# Patient Record
Sex: FEMALE | Race: ASIAN | Employment: FULL TIME | URBAN - METROPOLITAN AREA
[De-identification: names, ages, dates, MRNs, and addresses within clinical notes are randomized per-mention and may not be internally consistent; named-entity substitution may affect disease eponyms.]

---

## 2024-06-24 ENCOUNTER — HOSPITAL ENCOUNTER (INPATIENT)
Facility: HOSPITAL | Age: 44
LOS: 10 days | Discharge: HOME HEALTH | DRG: 409 | End: 2024-07-05
Attending: EMERGENCY MEDICINE | Admitting: HOSPITALIST
Payer: COMMERCIAL

## 2024-06-24 ENCOUNTER — HOSPITAL ENCOUNTER (INPATIENT)
Facility: HOSPITAL | Age: 44
LOS: 10 days | Discharge: HOME | DRG: 409 | End: 2024-07-05
Attending: EMERGENCY MEDICINE | Admitting: HOSPITALIST
Payer: COMMERCIAL

## 2024-06-24 DIAGNOSIS — K80.50 CHOLEDOCHOLITHIASIS: ICD-10-CM

## 2024-06-24 DIAGNOSIS — R17 ELEVATED BILIRUBIN: ICD-10-CM

## 2024-06-24 DIAGNOSIS — K83.1 COMMON BILE DUCT (CBD) OBSTRUCTION (HCC): ICD-10-CM

## 2024-06-24 DIAGNOSIS — K83.9 BILE LEAK: ICD-10-CM

## 2024-06-24 DIAGNOSIS — R74.8 ELEVATED LIVER ENZYMES: Primary | ICD-10-CM

## 2024-06-24 RX ORDER — PANTOPRAZOLE SODIUM 20 MG/1
20 TABLET, DELAYED RELEASE ORAL
COMMUNITY

## 2024-06-24 RX ORDER — ONDANSETRON 2 MG/ML
4 INJECTION INTRAMUSCULAR; INTRAVENOUS ONCE
Status: COMPLETED | OUTPATIENT
Start: 2024-06-24 | End: 2024-06-25

## 2024-06-25 ENCOUNTER — ANESTHESIA EVENT (OUTPATIENT)
Dept: SURGERY | Facility: HOSPITAL | Age: 44
DRG: 409 | End: 2024-06-25
Payer: COMMERCIAL

## 2024-06-25 ENCOUNTER — ANESTHESIA EVENT (OUTPATIENT)
Dept: ENDOSCOPY | Facility: HOSPITAL | Age: 44
DRG: 409 | End: 2024-06-25
Payer: COMMERCIAL

## 2024-06-25 ENCOUNTER — APPOINTMENT (OUTPATIENT)
Dept: CT IMAGING | Facility: HOSPITAL | Age: 44
DRG: 409 | End: 2024-06-25
Attending: EMERGENCY MEDICINE
Payer: COMMERCIAL

## 2024-06-25 ENCOUNTER — APPOINTMENT (OUTPATIENT)
Dept: GENERAL RADIOLOGY | Facility: HOSPITAL | Age: 44
DRG: 409 | End: 2024-06-25
Attending: HOSPITALIST
Payer: COMMERCIAL

## 2024-06-25 ENCOUNTER — ANESTHESIA (OUTPATIENT)
Dept: ENDOSCOPY | Facility: HOSPITAL | Age: 44
DRG: 409 | End: 2024-06-25
Payer: COMMERCIAL

## 2024-06-25 PROBLEM — R74.8 ELEVATED LIVER ENZYMES: Status: ACTIVE | Noted: 2024-06-25

## 2024-06-25 PROBLEM — R17 ELEVATED BILIRUBIN: Status: ACTIVE | Noted: 2024-06-25

## 2024-06-25 PROBLEM — K83.1 COMMON BILE DUCT (CBD) OBSTRUCTION (HCC): Status: ACTIVE | Noted: 2024-06-25

## 2024-06-25 PROBLEM — K80.01 CALCULUS OF GALLBLADDER WITH ACUTE CHOLECYSTITIS AND OBSTRUCTION: Status: ACTIVE | Noted: 2024-06-25

## 2024-06-25 LAB
ALBUMIN SERPL-MCNC: 2.8 G/DL (ref 3.4–5)
ALBUMIN SERPL-MCNC: 3.5 G/DL (ref 3.4–5)
ALBUMIN/GLOB SERPL: 0.7 {RATIO} (ref 1–2)
ALBUMIN/GLOB SERPL: 0.7 {RATIO} (ref 1–2)
ALP LIVER SERPL-CCNC: 134 U/L
ALP LIVER SERPL-CCNC: 163 U/L
ALT SERPL-CCNC: 565 U/L
ALT SERPL-CCNC: 713 U/L
ANION GAP SERPL CALC-SCNC: 5 MMOL/L (ref 0–18)
ANION GAP SERPL CALC-SCNC: 5 MMOL/L (ref 0–18)
AST SERPL-CCNC: 224 U/L (ref 15–37)
AST SERPL-CCNC: 302 U/L (ref 15–37)
B-HCG UR QL: NEGATIVE
BASOPHILS # BLD AUTO: 0.04 X10(3) UL (ref 0–0.2)
BASOPHILS NFR BLD AUTO: 0.4 %
BILIRUB SERPL-MCNC: 6.4 MG/DL (ref 0.1–2)
BILIRUB SERPL-MCNC: 8 MG/DL (ref 0.1–2)
BILIRUB UR QL CFM: POSITIVE
BUN BLD-MCNC: 6 MG/DL (ref 9–23)
BUN BLD-MCNC: 7 MG/DL (ref 9–23)
CALCIUM BLD-MCNC: 8.2 MG/DL (ref 8.5–10.1)
CALCIUM BLD-MCNC: 9.4 MG/DL (ref 8.5–10.1)
CHLORIDE SERPL-SCNC: 103 MMOL/L (ref 98–112)
CHLORIDE SERPL-SCNC: 108 MMOL/L (ref 98–112)
CO2 SERPL-SCNC: 24 MMOL/L (ref 21–32)
CO2 SERPL-SCNC: 28 MMOL/L (ref 21–32)
CREAT BLD-MCNC: 0.59 MG/DL
CREAT BLD-MCNC: 0.76 MG/DL
EGFRCR SERPLBLD CKD-EPI 2021: 100 ML/MIN/1.73M2 (ref 60–?)
EGFRCR SERPLBLD CKD-EPI 2021: 115 ML/MIN/1.73M2 (ref 60–?)
EOSINOPHIL # BLD AUTO: 0.14 X10(3) UL (ref 0–0.7)
EOSINOPHIL NFR BLD AUTO: 1.5 %
ERYTHROCYTE [DISTWIDTH] IN BLOOD BY AUTOMATED COUNT: 13.4 %
GLOBULIN PLAS-MCNC: 3.9 G/DL (ref 2.8–4.4)
GLOBULIN PLAS-MCNC: 5 G/DL (ref 2.8–4.4)
GLUCOSE BLD-MCNC: 73 MG/DL (ref 70–99)
GLUCOSE BLD-MCNC: 91 MG/DL (ref 70–99)
GLUCOSE UR STRIP.AUTO-MCNC: NORMAL MG/DL
HCT VFR BLD AUTO: 42.2 %
HGB BLD-MCNC: 15.1 G/DL
IMM GRANULOCYTES # BLD AUTO: 0.06 X10(3) UL (ref 0–1)
IMM GRANULOCYTES NFR BLD: 0.7 %
KETONES UR STRIP.AUTO-MCNC: 80 MG/DL
LEUKOCYTE ESTERASE UR QL STRIP.AUTO: NEGATIVE
LIPASE SERPL-CCNC: 33 U/L (ref 13–75)
LYMPHOCYTES # BLD AUTO: 1.71 X10(3) UL (ref 1–4)
LYMPHOCYTES NFR BLD AUTO: 18.9 %
MCH RBC QN AUTO: 29.8 PG (ref 26–34)
MCHC RBC AUTO-ENTMCNC: 35.8 G/DL (ref 31–37)
MCV RBC AUTO: 83.2 FL
MONOCYTES # BLD AUTO: 0.94 X10(3) UL (ref 0.1–1)
MONOCYTES NFR BLD AUTO: 10.4 %
NEUTROPHILS # BLD AUTO: 6.18 X10 (3) UL (ref 1.5–7.7)
NEUTROPHILS # BLD AUTO: 6.18 X10(3) UL (ref 1.5–7.7)
NEUTROPHILS NFR BLD AUTO: 68.1 %
NITRITE UR QL STRIP.AUTO: NEGATIVE
OSMOLALITY SERPL CALC.SUM OF ELEC: 280 MOSM/KG (ref 275–295)
OSMOLALITY SERPL CALC.SUM OF ELEC: 280 MOSM/KG (ref 275–295)
PH UR STRIP.AUTO: 6.5 [PH] (ref 5–8)
PLATELET # BLD AUTO: 113 10(3)UL (ref 150–450)
PLATELETS.RETICULATED NFR BLD AUTO: 6.4 % (ref 0–7)
POTASSIUM SERPL-SCNC: 3.6 MMOL/L (ref 3.5–5.1)
POTASSIUM SERPL-SCNC: 4.4 MMOL/L (ref 3.5–5.1)
PROT SERPL-MCNC: 6.7 G/DL (ref 6.4–8.2)
PROT SERPL-MCNC: 8.5 G/DL (ref 6.4–8.2)
RBC # BLD AUTO: 5.07 X10(6)UL
RBC UR QL AUTO: NEGATIVE
SODIUM SERPL-SCNC: 136 MMOL/L (ref 136–145)
SODIUM SERPL-SCNC: 137 MMOL/L (ref 136–145)
SP GR UR STRIP.AUTO: 1.02 (ref 1–1.03)
UROBILINOGEN UR STRIP.AUTO-MCNC: 2 MG/DL
WBC # BLD AUTO: 9.1 X10(3) UL (ref 4–11)

## 2024-06-25 PROCEDURE — 74328 X-RAY BILE DUCT ENDOSCOPY: CPT | Performed by: HOSPITALIST

## 2024-06-25 PROCEDURE — 0FC98ZZ EXTIRPATION OF MATTER FROM COMMON BILE DUCT, VIA NATURAL OR ARTIFICIAL OPENING ENDOSCOPIC: ICD-10-PCS | Performed by: INTERNAL MEDICINE

## 2024-06-25 PROCEDURE — 99254 IP/OBS CNSLTJ NEW/EST MOD 60: CPT | Performed by: SURGERY

## 2024-06-25 PROCEDURE — BF40ZZZ ULTRASONOGRAPHY OF BILE DUCTS: ICD-10-PCS | Performed by: INTERNAL MEDICINE

## 2024-06-25 PROCEDURE — 99223 1ST HOSP IP/OBS HIGH 75: CPT | Performed by: HOSPITALIST

## 2024-06-25 PROCEDURE — BF101ZZ FLUOROSCOPY OF BILE DUCTS USING LOW OSMOLAR CONTRAST: ICD-10-PCS | Performed by: INTERNAL MEDICINE

## 2024-06-25 PROCEDURE — 74177 CT ABD & PELVIS W/CONTRAST: CPT | Performed by: EMERGENCY MEDICINE

## 2024-06-25 PROCEDURE — 0DJ08ZZ INSPECTION OF UPPER INTESTINAL TRACT, VIA NATURAL OR ARTIFICIAL OPENING ENDOSCOPIC: ICD-10-PCS | Performed by: INTERNAL MEDICINE

## 2024-06-25 PROCEDURE — 0F7D8DZ DILATION OF PANCREATIC DUCT WITH INTRALUMINAL DEVICE, VIA NATURAL OR ARTIFICIAL OPENING ENDOSCOPIC: ICD-10-PCS | Performed by: INTERNAL MEDICINE

## 2024-06-25 DEVICE — GEENEN SOF-FLEX PANCREATIC STENT
Type: IMPLANTABLE DEVICE | Status: FUNCTIONAL
Brand: GEENEN SOF-FLEX

## 2024-06-25 RX ORDER — SODIUM CHLORIDE, SODIUM LACTATE, POTASSIUM CHLORIDE, CALCIUM CHLORIDE 600; 310; 30; 20 MG/100ML; MG/100ML; MG/100ML; MG/100ML
INJECTION, SOLUTION INTRAVENOUS CONTINUOUS PRN
Status: DISCONTINUED | OUTPATIENT
Start: 2024-06-25 | End: 2024-06-25 | Stop reason: SURG

## 2024-06-25 RX ORDER — HYDROMORPHONE HYDROCHLORIDE 1 MG/ML
0.4 INJECTION, SOLUTION INTRAMUSCULAR; INTRAVENOUS; SUBCUTANEOUS EVERY 5 MIN PRN
Status: ACTIVE | OUTPATIENT
Start: 2024-06-25 | End: 2024-06-26

## 2024-06-25 RX ORDER — INDOMETHACIN 100 MG
SUPPOSITORY, RECTAL RECTAL
Status: DISPENSED
Start: 2024-06-25 | End: 2024-06-26

## 2024-06-25 RX ORDER — NALOXONE HYDROCHLORIDE 0.4 MG/ML
0.08 INJECTION, SOLUTION INTRAMUSCULAR; INTRAVENOUS; SUBCUTANEOUS AS NEEDED
Status: ACTIVE | OUTPATIENT
Start: 2024-06-25 | End: 2024-06-26

## 2024-06-25 RX ORDER — HYDROMORPHONE HYDROCHLORIDE 1 MG/ML
0.6 INJECTION, SOLUTION INTRAMUSCULAR; INTRAVENOUS; SUBCUTANEOUS EVERY 5 MIN PRN
Status: ACTIVE | OUTPATIENT
Start: 2024-06-25 | End: 2024-06-26

## 2024-06-25 RX ORDER — HYDROMORPHONE HYDROCHLORIDE 1 MG/ML
0.2 INJECTION, SOLUTION INTRAMUSCULAR; INTRAVENOUS; SUBCUTANEOUS EVERY 5 MIN PRN
Status: ACTIVE | OUTPATIENT
Start: 2024-06-25 | End: 2024-06-26

## 2024-06-25 RX ORDER — INDOCYANINE GREEN AND WATER 25 MG
5 KIT INJECTION ONCE
Status: COMPLETED | OUTPATIENT
Start: 2024-06-26 | End: 2024-06-26

## 2024-06-25 RX ORDER — ONDANSETRON 2 MG/ML
4 INJECTION INTRAMUSCULAR; INTRAVENOUS EVERY 6 HOURS PRN
Status: DISCONTINUED | OUTPATIENT
Start: 2024-06-25 | End: 2024-07-02

## 2024-06-25 RX ORDER — PROCHLORPERAZINE EDISYLATE 5 MG/ML
5 INJECTION INTRAMUSCULAR; INTRAVENOUS EVERY 8 HOURS PRN
Status: DISCONTINUED | OUTPATIENT
Start: 2024-06-25 | End: 2024-07-02

## 2024-06-25 RX ORDER — SODIUM CHLORIDE, SODIUM LACTATE, POTASSIUM CHLORIDE, CALCIUM CHLORIDE 600; 310; 30; 20 MG/100ML; MG/100ML; MG/100ML; MG/100ML
INJECTION, SOLUTION INTRAVENOUS CONTINUOUS
Status: DISCONTINUED | OUTPATIENT
Start: 2024-06-25 | End: 2024-06-26 | Stop reason: HOSPADM

## 2024-06-25 RX ORDER — PANTOPRAZOLE SODIUM 20 MG/1
20 TABLET, DELAYED RELEASE ORAL
Status: DISCONTINUED | OUTPATIENT
Start: 2024-06-25 | End: 2024-07-02

## 2024-06-25 RX ORDER — LIDOCAINE HYDROCHLORIDE 10 MG/ML
INJECTION, SOLUTION EPIDURAL; INFILTRATION; INTRACAUDAL; PERINEURAL AS NEEDED
Status: DISCONTINUED | OUTPATIENT
Start: 2024-06-25 | End: 2024-06-25 | Stop reason: SURG

## 2024-06-25 RX ORDER — SODIUM CHLORIDE 9 MG/ML
INJECTION, SOLUTION INTRAVENOUS CONTINUOUS
Status: DISCONTINUED | OUTPATIENT
Start: 2024-06-25 | End: 2024-07-02

## 2024-06-25 RX ADMIN — SODIUM CHLORIDE, SODIUM LACTATE, POTASSIUM CHLORIDE, CALCIUM CHLORIDE: 600; 310; 30; 20 INJECTION, SOLUTION INTRAVENOUS at 16:43:00

## 2024-06-25 RX ADMIN — SODIUM CHLORIDE, SODIUM LACTATE, POTASSIUM CHLORIDE, CALCIUM CHLORIDE: 600; 310; 30; 20 INJECTION, SOLUTION INTRAVENOUS at 16:41:00

## 2024-06-25 RX ADMIN — LIDOCAINE HYDROCHLORIDE 25 MG: 10 INJECTION, SOLUTION EPIDURAL; INFILTRATION; INTRACAUDAL; PERINEURAL at 16:49:00

## 2024-06-25 NOTE — PLAN OF CARE
NURSING ADMISSION NOTE      Patient admitted via Cart  Oriented to room.  Safety precautions initiated.  Bed in low position.  Call light in reach.    Patient alert and oriented x4. VSS. Afebrile. Received from ED for epigastric pain. Denies pain at this time. NPO. Awaiting admitting orders.     0624: Orders received - IVF infusing.

## 2024-06-25 NOTE — ANESTHESIA PREPROCEDURE EVALUATION
PRE-OP EVALUATION    Patient Name: Stephy De Oliveira    Admit Diagnosis: Elevated liver enzymes [R74.8]  Elevated bilirubin [R17]    Pre-op Diagnosis: Choledocholithiasis    ENDOSCOPIC ULTRASOUND (EUS) WITH ENDOSCOPIC RETROGRADE CHOLANGIOPANCREATOGRAPHY    Anesthesia Procedure: ENDOSCOPIC ULTRASOUND (EUS) WITH ENDOSCOPIC RETROGRADE CHOLANGIOPANCREATOGRAPHY  ENDOSCOPIC RETROGRADE CHOLANGIOPANCREATOGRAPHY (ERCP)    Surgeons and Role:     * Km Gallo MD - Primary    Pre-op vitals reviewed.  Temp: 98 °F (36.7 °C)  Pulse: 51  Resp: 18  BP: 101/56  SpO2: 100 %  There is no height or weight on file to calculate BMI.    Current medications reviewed.  Hospital Medications:   [COMPLETED] sodium chloride 0.9 % IV bolus 1,000 mL  1,000 mL Intravenous Once    [COMPLETED] iopamidol 76% (ISOVUE-370) injection for power injector  80 mL Intravenous ONCE PRN    pantoprazole (Protonix) DR tab 20 mg  20 mg Oral QAM AC    sodium chloride 0.9% infusion   Intravenous Continuous    ondansetron (Zofran) 4 MG/2ML injection 4 mg  4 mg Intravenous Q6H PRN    prochlorperazine (Compazine) 10 MG/2ML injection 5 mg  5 mg Intravenous Q8H PRN    piperacillin-tazobactam (Zosyn) 3.375 g in dextrose 5% 100 mL IVPB-ADDV  3.375 g Intravenous Q8H    [START ON 6/26/2024] indocyanine green (IC-Green) injection 5 mg  5 mg Intravenous Once    [COMPLETED] ondansetron (Zofran) 4 MG/2ML injection 4 mg  4 mg Intravenous Once       Outpatient Medications:     Medications Prior to Admission   Medication Sig Dispense Refill Last Dose    pantoprazole 20 MG Oral Tab EC Take 1 tablet (20 mg total) by mouth every morning before breakfast.   6/24/2024       Allergies: Advil [ibuprofen]      Anesthesia Evaluation    Patient summary reviewed.    Anesthetic Complications           GI/Hepatic/Renal                                 Cardiovascular                                                       Endo/Other                                  Pulmonary                            Neuro/Psych                                      History reviewed. No pertinent surgical history.  Social History     Socioeconomic History    Marital status:    Tobacco Use    Smoking status: Never    Smokeless tobacco: Never   Substance and Sexual Activity    Alcohol use: Yes     Comment: socially    Drug use: Never     History   Drug Use Unknown     Available pre-op labs reviewed.  Lab Results   Component Value Date    WBC 9.1 06/25/2024    RBC 5.07 06/25/2024    HGB 15.1 06/25/2024    HCT 42.2 06/25/2024    MCV 83.2 06/25/2024    MCH 29.8 06/25/2024    MCHC 35.8 06/25/2024    RDW 13.4 06/25/2024    .0 (L) 06/25/2024     Lab Results   Component Value Date     06/25/2024    K 3.6 06/25/2024     06/25/2024    CO2 24.0 06/25/2024    BUN 6 (L) 06/25/2024    CREATSERUM 0.59 06/25/2024    GLU 73 06/25/2024    CA 8.2 (L) 06/25/2024            Airway      Mallampati: II  Mouth opening: 3 FB  TM distance: 4 - 6 cm  Neck ROM: full Cardiovascular    Cardiovascular exam normal.  Rhythm: regular  Rate: normal     Dental             Pulmonary    Pulmonary exam normal.                 Other findings              ASA: 2   Plan: MAC  NPO status verified and patient meets guidelines.          Plan/risks discussed with: patient and significant other                Present on Admission:  **None**

## 2024-06-25 NOTE — PROGRESS NOTES
[facility]   part of [facility]     Hospitalist Progress Note     Stephy De Oliveira Patient Status:  Observation    1980 MRN [id number]   Location [facility] 4NW-A Attending Rush Mendez MD   Hosp Day # 2 PCP None Pcp     Chief Complaint: Abdominal pain    Subjective:   Patient denies abdominal pain. No nausea or vomiting.     Current medications:   enoxaparin  40 mg Subcutaneous Daily    pantoprazole  20 mg Oral QAM AC    piperacillin-tazobactam  3.375 g Intravenous Q8H       Objective:    Review of Systems:   10 point ROS completed and was negative, except for pertinent positive and negatives stated in subjective.    Vital signs:  Temp:  [97.3 °F (36.3 °C)-99 °F (37.2 °C)] 98.3 °F (36.8 °C)  Pulse:  [50-71] 71  Resp:  [12-27] 19  BP: ()/(59-72) 111/64  SpO2:  [97 %-100 %] 100 %  Patient Weight for the past 72 hrs:   Weight   24 1949 135 lb (61.2 kg)     Physical Exam:    General: No acute distress.   Respiratory: Clear to auscultation bilaterally.   Cardiovascular: S1, S2. Regular rate and rhythm.   Abdomen: Soft, tender RUQ, nondistended.  Positive bowel sounds.   Extremities: No edema.  Neuro: AAOx3    Diagnostic Data:    Labs:  Recent Labs   Lab 24  0020 24  0635 24  0611   WBC 9.1 7.3 15.5*   HGB 15.1 13.1 12.5   MCV 83.2 85.9 87.1   .0* 99.0* 85.0*       Recent Labs   Lab 24  0720 24  0635 24  0611   GLU 73 92 93   BUN 6* 6* 5*   CREATSERUM 0.59 0.43* 0.50*   CA 8.2* 7.9* 7.8*   ALB 2.8* 2.3* 2.4*    138 137   K 3.6 3.8 3.5    110 106   CO2 24.0 21.0 25.0   ALKPHO 134* 112* 100*   * 148* 156*   * 455* 454*   BILT 6.4* 2.6* 3.1*   TP 6.7 5.9* 5.7*       CrCl cannot be calculated (Unknown ideal weight.).    No results for input(s): \"PTP\", \"INR\" in the last 168 hours.         COVID-19 Lab Results    COVID-19  No results found for: \"COVID19\"    Pro-Calcitonin  No results for input(s): \"PCT\" in the last 168  hours.    Cardiac  No results for input(s): \"TROP\", \"PBNP\" in the last 168 hours.    Creatinine Kinase  No results for input(s): \"CK\" in the last 168 hours.    Inflammatory Markers  No results for input(s): \"CRP\", \"DENIS\", \"LDH\", \"DDIMER\" in the last 168 hours.    No results for input(s): \"TROP\", \"TROPHS\", \"CK\" in the last 168 hours.    Imaging: Imaging data reviewed in Epic.    Medications:    enoxaparin  40 mg Subcutaneous Daily    pantoprazole  20 mg Oral QAM AC    piperacillin-tazobactam  3.375 g Intravenous Q8H       Assessment & Plan:    Abdominal pain with obstructive jaundice d/t choledocholithiasis sp EUS/ERCP with biliary sphincterotomy, balloon stone extraction and PD stent placement 6/26 sp lap khalif with IOC 6/26  NPO  IVF  IV abx  Pain control  HIDA today at 11  KUB 1 week  GI & Surgery consult  Thrombocytopenia, sepsis/meds/consumptive  Monitor      Supplementary Documentation:   Quality:  DVT Prophylaxis: Lovenox - hold     At this point Ms. De Oliveira is expected to be discharge to: Home    Plan of care discussed with patient and family.    Moshe López MD

## 2024-06-25 NOTE — ANESTHESIA PREPROCEDURE EVALUATION
PRE-OP EVALUATION    Patient Name: Stephy De Oliveira    Admit Diagnosis: Elevated liver enzymes [R74.8]  Elevated bilirubin [R17]    Pre-op Diagnosis: INPATIENT    Laparoscopic cholecystectomy with indocyanine green    Anesthesia Procedure: Laparoscopic cholecystectomy with indocyanine green    Surgeons and Role:     * Cheyanne Pereyra MD - Primary    Pre-op vitals reviewed.  Temp: 98.6 °F (37 °C)  Pulse: 46  Resp: 18  BP: 113/59  SpO2: 98 %  There is no height or weight on file to calculate BMI.    Current medications reviewed.  Hospital Medications:  • [COMPLETED] sodium chloride 0.9 % IV bolus 1,000 mL  1,000 mL Intravenous Once   • [COMPLETED] iopamidol 76% (ISOVUE-370) injection for power injector  80 mL Intravenous ONCE PRN   • [Transfer Hold] pantoprazole (Protonix) DR tab 20 mg  20 mg Oral QAM AC   • sodium chloride 0.9% infusion   Intravenous Continuous   • [Transfer Hold] ondansetron (Zofran) 4 MG/2ML injection 4 mg  4 mg Intravenous Q6H PRN   • [Transfer Hold] prochlorperazine (Compazine) 10 MG/2ML injection 5 mg  5 mg Intravenous Q8H PRN   • piperacillin-tazobactam (Zosyn) 3.375 g in dextrose 5% 100 mL IVPB-ADDV  3.375 g Intravenous Q8H   • [Transfer Hold] indocyanine green (IC-Green) injection 5 mg  5 mg Intravenous Once   • lactated ringers infusion   Intravenous Continuous   • [] lactated ringers IV bolus 500 mL  500 mL Intravenous Once PRN   • [] atropine 0.1 MG/ML injection 0.5 mg  0.5 mg Intravenous PRN   • [] naloxone (Narcan) 0.4 MG/ML injection 0.08 mg  0.08 mg Intravenous PRN   • [] fentaNYL (Sublimaze) 50 mcg/mL injection 25 mcg  25 mcg Intravenous Q5 Min PRN    Or   • [] fentaNYL (Sublimaze) 50 mcg/mL injection 50 mcg  50 mcg Intravenous Q5 Min PRN   • [] HYDROmorphone (Dilaudid) 1 MG/ML injection 0.2 mg  0.2 mg Intravenous Q5 Min PRN    Or   • [] HYDROmorphone (Dilaudid) 1 MG/ML injection 0.4 mg  0.4 mg Intravenous Q5 Min PRN    Or   •  [] HYDROmorphone (Dilaudid) 1 MG/ML injection 0.6 mg  0.6 mg Intravenous Q5 Min PRN   • [] indomethacin (Indocin) 100 MG rectal suppository       • [COMPLETED] ondansetron (Zofran) 4 MG/2ML injection 4 mg  4 mg Intravenous Once       Outpatient Medications:     Medications Prior to Admission   Medication Sig Dispense Refill Last Dose   • pantoprazole 20 MG Oral Tab EC Take 1 tablet (20 mg total) by mouth every morning before breakfast.   2024       Allergies: Advil [ibuprofen]      Anesthesia Evaluation    Patient summary reviewed.    Anesthetic Complications  (-) history of anesthetic complications         GI/Hepatic/Renal  Comment: Calculus of gallbladder with acute cholecystitis and obstruction  Common bile duct obstruction    (+) GERD and well controlled                          Cardiovascular    Negative cardiovascular ROS.  ECG reviewed.  Exercise tolerance: good     MET: >4                                           Endo/Other    Negative endo/other ROS.                              Pulmonary    Negative pulmonary ROS.                       Neuro/Psych    Negative neuro/psych ROS.                                History reviewed. No pertinent surgical history.  Social History     Socioeconomic History   • Marital status:    Tobacco Use   • Smoking status: Never   • Smokeless tobacco: Never   Substance and Sexual Activity   • Alcohol use: Yes     Comment: socially   • Drug use: Never     History   Drug Use Unknown     Available pre-op labs reviewed.  Lab Results   Component Value Date    WBC 9.1 2024    RBC 5.07 2024    HGB 15.1 2024    HCT 42.2 2024    MCV 83.2 2024    MCH 29.8 2024    MCHC 35.8 2024    RDW 13.4 2024    .0 (L) 2024     Lab Results   Component Value Date     2024    K 3.6 2024     2024    CO2 24.0 2024    BUN 6 (L) 2024    CREATSERUM 0.59 2024    GLU 73  06/25/2024    CA 8.2 (L) 06/25/2024            Airway      Mallampati: II  Mouth opening: 3 FB  TM distance: 4 - 6 cm  Neck ROM: full Cardiovascular    Cardiovascular exam normal.  Rhythm: regular  Rate: normal  (-) murmur   Dental    Dentition appears grossly intact         Pulmonary    Pulmonary exam normal.  Breath sounds clear to auscultation bilaterally.               Other findings        ASA: 2   Plan: general  NPO status verified and patient meets guidelines.    Post-procedure pain management plan discussed with surgeon and patient.      Plan/risks discussed with: patient and             Present on Admission:  **None**

## 2024-06-25 NOTE — ED PROVIDER NOTES
Patient Seen in: [facility] Emergency Department      History     Chief Complaint   Patient presents with    Abdomen/Flank Pain    Jaundice     Stated Complaint: abd pain, jaundice    Subjective:   HPI    44-year-old female presenting with abdominal pain in the epigastric region for the last several days no fevers chills no cough or cold no other exacerbating factors no surgical history on her abdomen no other exacerbating factors or associated symptoms    Objective:   Past Medical History:    Ovarian cyst              History reviewed. No pertinent surgical history.             Social History     Socioeconomic History    Marital status:    Tobacco Use    Smoking status: Never    Smokeless tobacco: Never   Substance and Sexual Activity    Alcohol use: Yes     Comment: socially    Drug use: Never              Review of Systems    Positive for stated complaint: abd pain, jaundice  Other systems are as noted in HPI.  Constitutional and vital signs reviewed.      All other systems reviewed and negative except as noted above.    Physical Exam     ED Triage Vitals [06/24/24 1949]   /76   Pulse 65   Resp 18   Temp 98.3 °F (36.8 °C)   Temp src Oral   SpO2 97 %   O2 Device None (Room air)       Current Vitals:   Vital Signs  BP: 101/69  Pulse: 74  Resp: 18  Temp: 98.3 °F (36.8 °C)  Temp src: Oral    Oxygen Therapy  SpO2: 100 %  O2 Device: None (Room air)            Physical Exam    Awake alert patient appears no distress HEENT exam is normal lungs are clear cardiovascular exam shows regular rhythm abdomen soft nontender extremities no Cyanosis or edema no rash back exam is normal skin is nondiaphoretic no focal neurologic deficits    ED Course     Labs Reviewed   COMP METABOLIC PANEL (14) - Abnormal; Notable for the following components:       Result Value    BUN 7 (*)      (*)      (*)     Alkaline Phosphatase 163 (*)     Bilirubin, Total 8.0 (*)     Total Protein 8.5 (*)     Globulin  5.0  (*)     A/G Ratio 0.7 (*)     All other components within normal limits   URINALYSIS, ROUTINE - Abnormal; Notable for the following components:    Clarity Urine Turbid (*)     Ketones Urine 80 (*)     Protein Urine Trace (*)     Urobilinogen Urine 2 (*)     RBC Urine 3-5 (*)     Bacteria Urine Rare (*)     Squamous Epi. Cells Few (*)     All other components within normal limits   ICTOTEST - Abnormal; Notable for the following components:    Ictotest Positive (*)     All other components within normal limits   CBC W/ DIFFERENTIAL - Abnormal; Notable for the following components:    .0 (*)     All other components within normal limits   LIPASE - Normal   POCT PREGNANCY URINE - Normal   CBC WITH DIFFERENTIAL WITH PLATELET    Narrative:     The following orders were created for panel order CBC With Differential With Platelet.  Procedure                               Abnormality         Status                     ---------                               -----------         ------                     CBC W/ DIFFERENTIAL[[id number]]          Abnormal            Final result                 Please view results for these tests on the individual orders.   RAINBOW DRAW LAVENDER   RAINBOW DRAW LIGHT GREEN     EKG    Rate, intervals and axes as noted on EKG Report.  Rate: 57    Rhythm: sinus  Reading: No areas of acute ST segment elevation or depression                 Differential diagnosis includes perforated viscus obstruction         MDM        Admission disposition: 6/25/2024  2:15 AM                                        Medical Decision Making  44-year-old female present with epigastric pain IV established cardiac monitor shows sinus rhythm pulse ox shows no signs of hypoxia.  CBC shows slightly elevated white cell metabolic panel shows elevated liver enzymes elevated bilirubin.  Independent interpretation by ED physician CT scan shows gallstones with dilated common bile duct.  Patient will be admitted with GI  consultation and surgical consultation.    Problems Addressed:  Elevated bilirubin: acute illness or injury  Elevated liver enzymes: acute illness or injury    Amount and/or Complexity of Data Reviewed  Labs: ordered. Decision-making details documented in ED Course.  Radiology: ordered and independent interpretation performed. Decision-making details documented in ED Course.  ECG/medicine tests: ordered and independent interpretation performed. Decision-making details documented in ED Course.        Disposition and Plan     Clinical Impression:  1. Elevated liver enzymes    2. Elevated bilirubin         Disposition:  Admit  6/25/2024  2:15 am    Follow-up:  No follow-up provider specified.        Medications Prescribed:  Current Discharge Medication List                            Hospital Problems       Present on Admission  Date Reviewed: 6/25/2024            ICD-10-CM Noted POA    * (Principal) Elevated liver enzymes R74.8 6/25/2024 Unknown    Elevated bilirubin R17 6/25/2024 Unknown

## 2024-06-25 NOTE — CONSULTS
[facility]                       Gastroenterology Consultation-[facility]    [facility] Lavonne De Oliveira Patient Status:  Observation    1980 MRN [id number]   Location [facility] 4NW-A Attending Rush Mendez MD   Hosp Day # 0 PCP None Pcp     Reason for consultation: Elevated LFTs, abd pain   HPI: This is a 44 yr-old female with hx of ovarian cysts s/p surgical (10+ yrs ago) who presented to ER yesterday with a 3 day hx of abd pain. Hx has been obtained with hospital provided language translation.  Patient presented to the ER yesterday with a 3-day history of epigastric abdominal pain which radiated to the right upper quadrant and back.  Her pain worsened with p.o. intake and she reports mild nausea and a decreased appetite.  No fevers, chills, diarrhea, melena, or hematochezia.  She reports similar less severe attacks occurring sporadically over the last several years.  She reports stable weight over the last several months and no changes in appetite until acute onset of symptoms.  No herbal supplements.  Only chronic medication is acid suppressant.  No NSAIDs, anticoagulants, or antiplatelet agents.  No history of undergoing an endoscopic evaluation.  No recent travel, recent antibiotics, known sick contacts, or dietary indiscretions.  CT a/p IV suggests normal liver appearance, cholelithiasis + sludge with dilation of CBD (9 mm) and moderate intrahepatic biliary tree dilation. No pancreatic changes noted and no signs of acute cholecystitis; labs with elevated LFTs (  Alk Phos 134 Bili 6.4), normal lipase (33), normal kidney functions, normal WBC (9.1), with low plt (113).   PMHx:   Past Medical History:    Ovarian cyst                PSHx: History reviewed. No pertinent surgical history.  Medications:    [COMPLETED] sodium chloride 0.9 % IV bolus 1,000 mL  1,000 mL Intravenous Once    [COMPLETED] iopamidol 76% (ISOVUE-370) injection for power injector  80 mL Intravenous ONCE  PRN    pantoprazole (Protonix) DR tab 20 mg  20 mg Oral QAM AC    sodium chloride 0.9% infusion   Intravenous Continuous    ondansetron (Zofran) 4 MG/2ML injection 4 mg  4 mg Intravenous Q6H PRN    prochlorperazine (Compazine) 10 MG/2ML injection 5 mg  5 mg Intravenous Q8H PRN    piperacillin-tazobactam (Zosyn) 3.375 g in dextrose 5% 100 mL IVPB-ADDV  3.375 g Intravenous Q8H    [COMPLETED] ondansetron (Zofran) 4 MG/2ML injection 4 mg  4 mg Intravenous Once     Allergies:   Allergies   Allergen Reactions    Advil [Ibuprofen] ITCHING     Social HX:   Social History     Socioeconomic History    Marital status:    Tobacco Use    Smoking status: Never    Smokeless tobacco: Never   Substance and Sexual Activity    Alcohol use: Yes     Comment: socially    Drug use: Never     Social Determinants of Health     Food Insecurity: No Food Insecurity (6/25/2024)    Food Insecurity     Food Insecurity: Never true   Transportation Needs: No Transportation Needs (6/25/2024)    Transportation Needs     Lack of Transportation: No   Housing Stability: Low Risk  (6/25/2024)    Housing Stability     Housing Instability: No      FamHx: The patient has no family history of colon cancer or other gastrointestinal malignancies;  No family history of ulcer disease, or inflammatory bowel disease  ROS:  In addition to the pertinent positives described above:            Infectious Disease:  No chronic infections or recent fevers prior to the acute illness            Cardiovascular: No history of CAD, prior MI, chest pain, or palpitations            Respiratory: No shortness of breath, asthma, copd, recurrent pneumonia            Hematologic: The patient reports no easy bruising, frequent gum bleeding or nose bleeding;  The patient has no history of known chronic anemia            Dermatologic: The patient reports no recent rashes or chronic skin disorders            Rheumatologic: The patient reports no history of chronic arthritis,  myalgias, arthralgias            Genitourinary:  The patient reports no history of recurrent urinary tract infections, hematuria, dysuria, or nephrolithiasis           Psychiatric: The patient reports no history of depression, anxiety, suicidal ideation, or homicidal ideation           Oncologic: The patient reports no history of prior solid tumor or hematologic malignancy           ENT: The patient reports no hoarseness of voice, hearing loss, sinus congestion, tinnitus           Neurologic: The patient reports no history of seizure, stroke, or frequent headaches  PE: BP 95/58   Pulse 56   Temp 98.1 °F (36.7 °C) (Oral)   Resp 18   Wt 135 lb (61.2 kg)   SpO2 98%   Gen: AAO x 3, able to speak in complete sentences per language line  service  HENT: EOMI, PERRL, oropharynx is clear with moist mucosal membranes  Eyes: Sclerae are anicteric  Neck:  Supple without nuchal rigidity  CV: Bradycardic, regular  Resp: Clear to auscultation bilaterally without wheezes; rubs, rhonchi, or rales  Abdomen: Soft, mild epigastric tenderness, non-distended with the presence of bowel sounds; No hepatosplenomegaly; no rebound or guarding; No ascites is clinically apparent; no tympany to percussion, well-healed midline incision  Ext: No peripheral edema or cyanosis  Skin: Warm and dry  Psychiatric: Appropriate mood and congruent affect without obvious depression or anxiety  Labs:   Lab Results   Component Value Date    WBC 9.1 06/25/2024    HGB 15.1 06/25/2024    HCT 42.2 06/25/2024    .0 06/25/2024    CREATSERUM 0.59 06/25/2024    BUN 6 06/25/2024     06/25/2024    K 3.6 06/25/2024     06/25/2024    CO2 24.0 06/25/2024    GLU 73 06/25/2024    CA 8.2 06/25/2024    ALB 2.8 06/25/2024    ALKPHO 134 06/25/2024    BILT 6.4 06/25/2024     06/25/2024     06/25/2024    LIP 33 06/25/2024     Recent Labs   Lab 06/25/24  0020 06/25/24  0720   GLU 91 73   BUN 7* 6*   CREATSERUM 0.76 0.59   CA 9.4 8.2*     137   K 4.4 3.6    108   CO2 28.0 24.0     Recent Labs   Lab 06/25/24  0020   RBC 5.07   HGB 15.1   HCT 42.2   MCV 83.2   MCH 29.8   MCHC 35.8   RDW 13.4   NEPRELIM 6.18   WBC 9.1   .0*       Recent Labs   Lab 06/25/24  0020 06/25/24  0720   * 565*   * 224*       Imaging:   PROCEDURE:  CT ABDOMEN+PELVIS (CONTRAST ONLY) (CPT=74177)     COMPARISON:  None.     INDICATIONS:  abd pain, jaundice     TECHNIQUE:  CT scanning was performed from the dome of the diaphragm to the pubic symphysis with non-ionic intravenous contrast material. Post contrast coronal MPR imaging was performed.  Dose reduction techniques were used. Dose information is  transmitted to the ACR (American College of Radiology) NRDR (National Radiology Data Registry) which includes the Dose Index Registry.     PATIENT STATED HISTORY:(As transcribed by Technologist)  Abdomen pain; jaundice      CONTRAST USED:  80cc of Isovue 370     FINDINGS:    LIVER:  No enlargement, atrophy, abnormal density, or significant focal lesion.    BILIARY:  Cholelithiasis and biliary sludge, which extends into the common bile duct.  There is associated dilation of the common bile duct measuring approximately 9 millimeters as well as moderate intrahepatic biliary ductal dilation.  PANCREAS:  No lesion, fluid collection, ductal dilatation, or atrophy.    SPLEEN:  No enlargement or focal lesion.    KIDNEYS:  No mass, obstruction, or calcification.    ADRENALS:  No mass or enlargement.    AORTA/VASCULAR:  No aneurysm or dissection.    RETROPERITONEUM:  No mass or adenopathy.    BOWEL/MESENTERY:  No dilated bowel or wall thickening.  Normal appendix.  ABDOMINAL WALL:  No significant findings  URINARY BLADDER:  Nondistended  PELVIC NODES:  No adenopathy.    PELVIC ORGANS:  No visible mass.  Pelvic organs appropriate for patient age.    BONES:  No bony lesion or fracture.    LUNG BASES:  No visible pulmonary or pleural disease.    OTHER:  Negative.         Impression   CONCLUSION:  Cholelithiasis and biliary sludge extending into the common bile duct with moderate upstream biliary dilation.  The common bile duct measures up to 9 millimeters.  There is cholelithiasis within the cystic duct, no evidence of acute  cholecystitis at this time.  Consider HIDA scan if there is concern for cystic duct obstruction.     Preliminary report provided by [facility] at time of examination.     LOCATION:  [facility]     Dictated by (CST): Jimmy Rosales MD on 6/25/2024 at 6:37 AM      Finalized by (CST): Jimmy Rosales MD on 6/25/2024 at 6:41 AM     Impression: 44 yr-old female with hx of ovarian cysts s/p surgical (10+ yrs ago) admitted yesterday with a 3 day hx of abd pain which progressed to nausea and poor PO intake. She reports similar less severe episodes for several yrs. No wt loss. No herbal supplements, recent travel, or known sick contacts. Imaging suggests normal liver appearance, cholelithiasis + sludge with dilation of CBD (9 mm) and moderate intrahepatic biliary tree dilation. No pancreatitis per imaging or labs. Discussed with interventional GI (Dr Gallo) and will plan for EUS with possible ERCP  The risks, benefits, alternatives of the procedure including the risks of anesthesia, bleeding, perforation, missed lesions, need for surgery, infection, and acute pancreatitis were discussed with the patient using language line translation. She expressed understanding of the risks and was agreeable to proceed.    Recommendations:     EUS with possible ERCP under MAC with Dr Gallo  NPO with sips of water for necessary medications   Continue IV abx  Pain management per Hospitalist recommendations; antiemetics as needed   Timing of cholecystectomy per general surgery recommendations   CBC, CMP in AM correcting electrolytes per Hospitalist recommendations     Thank you for the consultation, we will follow the patient with you.  Attending addendum (Dr NICHO Boyce) to  follow later today and provide formal, final recommendations at that time   Angela DAPHNE Huerta  8:59 AM  6/25/2024  [facility]  [phone]    GI attending addendum:    I have personally seen and examined this patient and agree with above nurse practitioner's assessment and recommendations.     Note this is a late attestation.  Date of service was June 25, 2024.        Briefly, patient is a 44-year-old female that presented to the ER yesterday with 3 days of abdominal pain and elevated liver enzymes and CT scan showed cholelithiasis and biliary sludge extending into the common bile duct.  Liver enzymes were elevated with total bilirubin of 8 and lipase was normal.  On physical exam, patient was resting comfortably bed.  Kyrgyz  services were used.  Her abdomen is soft, mildly tender in the upper abdomen, and nondistended.  Patient with elevated liver enzymes and CT findings concerning for choledocholithiasis as a likely etiology for this.  She was arranged for EUS and ERCP with Dr. Gallo with removal of choledocholithiasis.  General surgery is also following.  Monitor CBC and CMP in the morning.    Jason Boyce DO  11:12 PM  6/28/2024  [facility]  [phone]

## 2024-06-25 NOTE — H&P
[facility]     Stephy De Oliveira Patient Status:  Observation    1980 MRN [id number]   Location [facility] 4NW-A Attending Katrina Reddy MD   Hosp Day # 0 PCP None Pcp     Chief Complaint: Jaundice and abdominal pain    Subjective:    History of Present Illness:     Stephy De Oliveira is a 44 year old female with no significant past medical history comes to the ER with complaints of abdominal pain and jaundice.  She states that it has been ongoing for the last 3 days.  Her pain is located in her mid abdomen radiating to her right upper quadrant.  She states that she has associated nausea but no vomiting.  She denies any fever, chills, chest pain or shortness of breath.    History/Other:    Past Medical History:  Past Medical History:    Ovarian cyst     Past Surgical History:   History reviewed. No pertinent surgical history.   Family History:   History reviewed. No pertinent family history.  Social History:    reports that she has never smoked. She has never used smokeless tobacco. She reports current alcohol use. She reports that she does not use drugs.     Allergies:   Allergies   Allergen Reactions    Advil [Ibuprofen] ITCHING       Medications:    No current facility-administered medications on file prior to encounter.     Current Outpatient Medications on File Prior to Encounter   Medication Sig Dispense Refill    pantoprazole 20 MG Oral Tab EC Take 1 tablet (20 mg total) by mouth every morning before breakfast.         Review of Systems:   A comprehensive review of systems was completed.    Pertinent positives and negatives noted in the HPI.    Objective:   Physical Exam:    /69   Pulse 74   Temp 98.3 °F (36.8 °C) (Oral)   Resp 18   Wt 135 lb (61.2 kg)   SpO2 100%   General: No acute distress, Alert  Respiratory: No rhonchi, no wheezes  Cardiovascular: S1, S2. Regular rate and rhythm  Abdomen: Soft, Non-tender, non-distended, positive bowel sounds  Neuro:  No new focal deficits  Extremities: No edema      Results:    Labs:      Labs Last 24 Hours:    Recent Labs   Lab 06/25/24  0020   RBC 5.07   HGB 15.1   HCT 42.2   MCV 83.2   MCH 29.8   MCHC 35.8   RDW 13.4   NEPRELIM 6.18   WBC 9.1   .0*       Recent Labs   Lab 06/25/24  0020   GLU 91   BUN 7*   CREATSERUM 0.76   EGFRCR 100   CA 9.4   ALB 3.5      K 4.4      CO2 28.0   ALKPHO 163*   *   *   BILT 8.0*   TP 8.5*       No results found for: \"PT\", \"INR\"    No results for input(s): \"TROP\", \"TROPHS\", \"CK\" in the last 168 hours.    No results for input(s): \"TROP\", \"PBNP\" in the last 168 hours.    No results for input(s): \"PCT\" in the last 168 hours.    Imaging: Imaging data reviewed in Epic.    Assessment & Plan:      #Obstructive jaundice  #Elevated LFTs  #Dilated CBD  #Gallstones and possible Acute khalif  -NPO  -tend LFTs  -Pain control  -IVF  -GI and Sx consulted  -Abx      Plan of care discussed with patient    Katrina Reddy MD    Supplementary Documentation:     The 21st Century Cures Act makes medical notes like these available to patients in the interest of transparency. Please be advised this is a medical document. Medical documents are intended to carry relevant information, facts as evident, and the clinical opinion of the practitioner. The medical note is intended as peer to peer communication and may appear blunt or direct. It is written in medical language and may contain abbreviations or verbiage that are unfamiliar.

## 2024-06-25 NOTE — ANESTHESIA POSTPROCEDURE EVALUATION
[facility]    Stephy Thu Angela De Oliveira Patient Status:  Inpatient   Age/Gender 44 year old female MRN [id number]   Location [facility] ENDOSCOPY PAIN [facility]ENTER Attending Rush Mendez MD   Hosp Day # 0 PCP None Pcp       Anesthesia Post-op Note    ENDOSCOPIC ULTRASOUND (EUS) WITH ENDOSCOPIC RETROGRADE CHOLANGIOPANCREATOGRAPHY with spincterotomy, balloon sweep and pancreatic stent placement    Procedure Summary       Date: 06/25/24 Room / Location:  ENDOSCOPY 02 / [facility]    Anesthesia Start: 1644 Anesthesia Stop: 1711    Procedures:       ENDOSCOPIC ULTRASOUND (EUS) WITH ENDOSCOPIC RETROGRADE CHOLANGIOPANCREATOGRAPHY with spincterotomy, balloon sweep and pancreatic stent placement      ENDOSCOPIC RETROGRADE CHOLANGIOPANCREATOGRAPHY (ERCP) Diagnosis: (Choledocholithiasis)    Surgeons: Km Gallo MD Anesthesiologist: Christiano Marti MD    Anesthesia Type: MAC ASA Status: 2            Anesthesia Type: MAC    Vitals Value Taken Time   /67 06/25/24 1711   Temp 98.3 °F (36.8 °C) 06/25/24 1711   Pulse 89 06/25/24 1711   Resp 16 06/25/24 1711   SpO2 99 % 06/25/24 1711       Patient Location: Endoscopy    Anesthesia Type: MAC    Airway Patency: patent    Postop Pain Control: adequate    Mental Status: mildly sedated but able to meaningfully participate in the post-anesthesia evaluation    Nausea/Vomiting: none    Cardiopulmonary/Hydration status: stable euvolemic    Complications: no apparent anesthesia related complications    Postop vital signs: stable    Dental Exam: Unchanged from Preop    Patient to be discharged home when criteria met.

## 2024-06-25 NOTE — ED QUICK NOTES
Orders for admission, patient is aware of plan and ready to go upstairs. Any questions, please call ED RN Thuy  at extension [phone].     Vaccinated?  Type of COVID test sent:  COVID Suspicion level: Low/High  low    Titratable drug(s) infusing:  Rate:  Sodium chloride bolus  LOC at time of transport:  aox4  Other pertinent information:    CIWA score=  NIH score=

## 2024-06-25 NOTE — ED INITIAL ASSESSMENT (HPI)
Pt to ER with complaints of of acid reflux. Pt reports she has a hx of gerd. Patient reports that she was eating spicy foods earlier this week and reports burning and discomfort in her abd, radiating to her bilat flanks. Patient reports she is having chest pain and back pain.

## 2024-06-25 NOTE — PLAN OF CARE
Pt received A&Ox4, Estonian speaking. Afebrile. VSS, SB on tele - Dr. Page aware. Denies pain, n/v/d. Seen by GI. NPO status maintained. Plan for ERCP this evening. Consent signed and in chart. Lap khalif scheduled for tomorrow AM w/ Dr. Pereyra. Consent signed. Pt started on CLD following ERCP per Dr. Gallo. Pt to be NPO @ [facility]. IVF infusing @ 100ml/hr. IV zosyn per MAR. Call light in reach.

## 2024-06-25 NOTE — OPERATIVE REPORT
Stephy De Oliveira Patient Status:  Inpatient    1980 MRN [id number]   [facility] ENDOSCOPY PAIN CENTER Attending Rush Mendez MD   Date 2024 PCP None Pcp     PREOPERATIVE DIAGNOSIS/INDICATION: Abnormal LFT's and CT  POSTOPERTATIVE DIAGNOSIS: Choledocholithiasis  PROCEDURE PERFORMED: EUS/EGD  TIME OUT WAS PERFORMED    SEDATION: MAC sedation provided by General Anesthesia    INFORMED CONSENT: Risks, benefits and alternatives to the procedure were explained to the patient including but not limited to bleeding, infection, perforation, adverse drug reactions, pancreatitis and the need for hospitalization and surgery if this occurs, the patient understands and agrees to procedure.  PROCEDURE DESCRIPTION: The therapeutic side viewing echoendoscope was introduced into the patient’s mouth, hypo pharynx, esophagus, stomach and the first and second portion of the duodenum, straightening of the endoscope was performed to obtain a direct view of the major ampulla. Careful but limited examination of the above described areas was performed on withdrawal of the endoscope. The patient tolerated the procedure well and there were no immediate complications noted during the procedure, the patient was transported to the recovery area in stable condition.  FINDINGS:  DUODENUM: Normal  MAJOR AMPULLA: Normal  ECHO: Imaging was performed through the stomach and duodenum. The common bile duct was dilated with multiple echogenic filling defects with shadowing  RECOMMENDATIONS:   Proceed with ERCP    Km Gallo MD  2024  5:06 PM

## 2024-06-25 NOTE — OPERATIVE REPORT
Stephy Aguilaru Angela De Oliveira Patient Status:  Inpatient    1980 MRN [id number]   [facility] ENDOSCOPY PAIN CENTER Attending Rush Mendez MD   Date 2024 PCP None Pcp     PREOPERATIVE DIAGNOSIS/INDICATION: Abnormal LFT's, choledocholithiasis  POSTOPERTATIVE DIAGNOSIS: Same  PROCEDURE PERFORMED: ERCP with biliary sphincterotomy, balloon stone extraction and PD stent placement  TIME OUT WAS PERFORMED    SEDATION: MAC sedation provided by General Anesthesia    INFORMED CONSENT: Risks, benefits and alternatives to the procedure were explained to the patient including but not limited to bleeding, infection, perforation, adverse drug reactions, pancreatitis and the need for hospitalization and surgery if this occurs, the patient understands and agrees to procedure.  PROCEDURE DESCRIPTION: The therapeutic side viewing duodenoscope was introduced into the patient’s mouth, hypo pharynx, esophagus, stomach and the first and second portion of the duodenum, straightening of the endoscope was performed to obtain a direct view of the major ampulla. Careful but limited examination of the above described areas was performed on withdrawal of the endoscope. The patient tolerated the procedure well and there were no immediate complications noted during the procedure, the patient was transported to the recovery area in stable condition.  FINDINGS:  DUODENUM: Normal  MAJOR AMPULLA: Normal  ERP: Guidewire cannulation only  ERC: The CBD and the CHD were dilated with multiple filling defects cw choledocholithiasis, the confluence of the right and left hepatic ducts and the intrahepatics appear wnl, the cystic duct and the gallbladder were not visualized  THERAPEUTICS: The CBD was cannulated using a standard 0.035 [employer] Cook tome, deep cannulation was performed with the help of a 0.035 straight Accrobat wire 260cm in length, an adequate biliary sphincterotomy was performed with standard ERBE settings, there were no  immediate complication noted. Biliary balloon stone extraction was performed the help of [employer]’s 9-12mm triple lumen stone extraction balloon, large amount of yellow soft stones and sludge were removed, occlusion cholangiogram was performed bile duct clearance appear complete. We placed a pancreas plastic stent size 5 Fr x 3 cm with no internal flanges successfully; at the end of the procedure the proximal end was located at the pancreas head and the distal end in the duodenum.  RECOMMENDATIONS:   Post ERCP precautions, watch for bleeding, infection, perforation, adverse drug reactions and pancreatitis.  CMP, CBC in AM.  Call status report post procedure.  Cholecystectomy per general surgery  KUB in 1 week    Km Gallo MD  6/25/2024  5:07 PM

## 2024-06-25 NOTE — CONSULTS
[facility]  Report of Consultation    Stephy De Oliveira Patient Status:  Observation    1980 MRN [id number]   Location [facility] 4NW-A Attending Rush Mendez MD   Hosp Day # 0 PCP None Pcp     Requesting Physician:  Dr. Rush Mendez    Reason for Consultation:  Choledocholithiasis    Chief Complaint:  Abdominal pain    History of Present Illness:  Stephy De Oliveira is a 44 year old female who presents to [facility] on 2024 for evaluation of abdominal pain.  The patient is seen today with assistance of language line .    Patient reports a 3-day history of epigastric abdominal pain.  She reports the pain radiated through to her back.  She reported that the pain was severe.  She denied associated nausea or vomiting.  She denied diarrhea.  She denied fever or chills.  The patient states that she has history of similar abdominal pain over the past several years.    Upon presentation to the hospital, the patient was afebrile and hemodynamically stable.  WBC 9.1 hemoglobin 15.1 platelets 113,000.  Alkaline phosphatase 163   total bilirubin 8.0.  Lipase 33.    Past abdominal surgical history includes oophorectomy.    History:  Past Medical History:    Ovarian cyst     History reviewed. No pertinent surgical history.  History reviewed. No pertinent family history.   reports that she has never smoked. She has never used smokeless tobacco. She reports current alcohol use. She reports that she does not use drugs.    Allergies:  Allergies   Allergen Reactions    Advil [Ibuprofen] ITCHING       Medications:  Medications Prior to Admission   Medication Sig    pantoprazole 20 MG Oral Tab EC Take 1 tablet (20 mg total) by mouth every morning before breakfast.         Current Facility-Administered Medications:     pantoprazole (Protonix) DR tab 20 mg, 20 mg, Oral, QAM AC    sodium chloride 0.9% infusion, , Intravenous, Continuous    ondansetron (Zofran) 4 MG/2ML injection 4 mg,  4 mg, Intravenous, Q6H PRN    prochlorperazine (Compazine) 10 MG/2ML injection 5 mg, 5 mg, Intravenous, Q8H PRN    piperacillin-tazobactam (Zosyn) 3.375 g in dextrose 5% 100 mL IVPB-ADDV, 3.375 g, Intravenous, Q8H    Review of Systems:  Review of Systems   Constitutional:  Negative for chills and fever.   HENT:  Negative for congestion, rhinorrhea, sinus pressure and sore throat.    Respiratory:  Negative for cough, shortness of breath and wheezing.    Cardiovascular:  Negative for chest pain and leg swelling.   Gastrointestinal:  Positive for abdominal pain. Negative for nausea, vomiting, diarrhea, constipation, blood in stool, abdominal distention and anal bleeding.   Endocrine: Negative for cold intolerance and heat intolerance.   Genitourinary:  Negative for dysuria and hematuria.   Musculoskeletal:  Negative for back pain and joint pain.   Skin:  Negative for rash and wound.   Neurological:  Negative for facial asymmetry, speech difficulty and numbness.   Psychiatric/Behavioral:  Negative for confusion. The patient is not nervous/anxious.        Physical Exam:  BP 95/58   Pulse 56   Temp 98.1 °F (36.7 °C) (Oral)   Resp 18   Wt 135 lb (61.2 kg)   SpO2 98%   Physical Exam  Constitutional:       General: She is not in acute distress.     Appearance: Normal appearance. She is not ill-appearing.   HENT:      Head: Normocephalic and atraumatic.      Mouth/Throat:      Mouth: Mucous membranes are moist.      Pharynx: Oropharynx is clear.   Eyes:      Pupils: Pupils are equal, round, and reactive to light.   Cardiovascular:      Rate and Rhythm: Normal rate and regular rhythm.      Pulses: Normal pulses.   Pulmonary:      Effort: Pulmonary effort is normal. No respiratory distress.   Abdominal:      General: There is no distension.      Palpations: Abdomen is soft.      Tenderness: There is abdominal tenderness. There is no guarding or rebound.      Comments: Soft, mildly distended, positive epigastric tenderness  to palpation.  No rebound tenderness.  No guarding.   Musculoskeletal:         General: Normal range of motion.      Cervical back: Normal range of motion.   Skin:     General: Skin is warm and dry.   Neurological:      General: No focal deficit present.      Mental Status: She is alert and oriented to person, place, and time.   Psychiatric:         Mood and Affect: Mood normal.         Behavior: Behavior normal.         Laboratory Data:  Recent Labs   Lab 06/25/24  0020   RBC 5.07   HGB 15.1   HCT 42.2   MCV 83.2   MCH 29.8   MCHC 35.8   RDW 13.4   NEPRELIM 6.18   WBC 9.1   .0*       Recent Labs   Lab 06/25/24  0020 06/25/24  0720   GLU 91 73   BUN 7* 6*   CREATSERUM 0.76 0.59   CA 9.4 8.2*   ALB 3.5 2.8*    137   K 4.4 3.6    108   CO2 28.0 24.0   ALKPHO 163* 134*   * 224*   * 565*   BILT 8.0* 6.4*   TP 8.5* 6.7         No results for input(s): \"PTP\", \"INR\", \"PTT\" in the last 168 hours.      CT ABDOMEN+PELVIS(CONTRAST ONLY)(CPT=74177)    Result Date: 6/25/2024  CONCLUSION:  Cholelithiasis and biliary sludge extending into the common bile duct with moderate upstream biliary dilation.  The common bile duct measures up to 9 millimeters.  There is cholelithiasis within the cystic duct, no evidence of acute cholecystitis at this time.  Consider HIDA scan if there is concern for cystic duct obstruction.  Preliminary report provided by [facility] at time of examination.    LOCATION:  [facility]   Dictated by (CST): Jimmy Rosales MD on 6/25/2024 at 6:37 AM     Finalized by (CST): Jimmy Rosales MD on 6/25/2024 at 6:41 AM          Vidhi Hewitt PA-C  6/25/2024  9:38 AM    Is this a shared or split note between Advanced Practice Provider and Physician? Yes      Medical Decision Making         Impression:  Patient Active Problem List   Diagnosis    Elevated liver enzymes    Elevated bilirubin    Common bile duct (CBD) obstruction (HCC)    Calculus of gallbladder with acute cholecystitis  and obstruction       44-year-old female who was admitted through the emergency department with complaints of abdominal pain.    Interview was conducted with the assistance of  ID number [id number] as the patient is primarily [place] citizen speaking.  Workup in the emergency department revealed elevated LFTs with total bilirubin of 8.0.  CBC within normal limits except platelet count mildly diminished at 113  CT scan abdomen pelvis reveals cholelithiasis, biliary sludge extending into the common bile duct with biliary dilatation.  CBD measures 9 mm.  Cholelithiasis is also noted in the cystic duct.  Patient is also been seen by GI service.  She is scheduled for ERCP and stone extraction today.  Subsequently we did discuss proceeding with laparoscopic cholecystectomy with intraoperative cholangiogram for symptomatic cholelithiasis  The patient is comfortable with these treatment recommendations.  She has no further questions at this time and will proceed as discussed  The risks, benefits, complications, possible outcomes and alternatives of the procedure were explained to the patient in detail.  Potential complications of this procedure and as with any surgical procedure and anesthesia were reviewed including but not limited to bleeding, infection, thromboembolic event, pneumonia were discussed.  Expected postoperative pain, recuperation and postoperative course and possible complications were also reviewed.  All questions from the patient were answered in detail.  The patient did verbalize understanding and does not have any further questions at this time.  The patient does wish to proceed with the proposed procedure.    LITA Pereyra MD, FACS    Please note that this report has been produced using speech recognition software and may contain errors related to that system including but not limited to errors in grammar, punctuation and spelling as well as words and phrases that possibly may have been recognized  inappropriately.  If there are any questions or concerns please contact the dictating provider for clarification.  The 21st Century Cures Act makes medical notes like these available to patients in the interest of trans parency. Please be advised this is a medical document. Medical documents are intended to carry relevant information, facts as evident, and the clinical opinion of the practitioner. The medical note is intended as peer to peer communication and may appear blunt or direct. It is written in medical language and may contain abbreviations or verbiage that are unfamiliar.  If there are any questions or concerns please contact the dictating provider for clarification.

## 2024-06-26 ENCOUNTER — ANESTHESIA (OUTPATIENT)
Dept: SURGERY | Facility: HOSPITAL | Age: 44
DRG: 409 | End: 2024-06-26
Payer: COMMERCIAL

## 2024-06-26 ENCOUNTER — APPOINTMENT (OUTPATIENT)
Dept: GENERAL RADIOLOGY | Facility: HOSPITAL | Age: 44
DRG: 409 | End: 2024-06-26
Attending: SURGERY
Payer: COMMERCIAL

## 2024-06-26 LAB
ALBUMIN SERPL-MCNC: 2.3 G/DL (ref 3.4–5)
ALBUMIN/GLOB SERPL: 0.6 {RATIO} (ref 1–2)
ALP LIVER SERPL-CCNC: 112 U/L
ALT SERPL-CCNC: 455 U/L
ANION GAP SERPL CALC-SCNC: 7 MMOL/L (ref 0–18)
AST SERPL-CCNC: 148 U/L (ref 15–37)
ATRIAL RATE: 57 BPM
BASOPHILS # BLD AUTO: 0.03 X10(3) UL (ref 0–0.2)
BASOPHILS NFR BLD AUTO: 0.4 %
BILIRUB SERPL-MCNC: 2.6 MG/DL (ref 0.1–2)
BUN BLD-MCNC: 6 MG/DL (ref 9–23)
CALCIUM BLD-MCNC: 7.9 MG/DL (ref 8.5–10.1)
CHLORIDE SERPL-SCNC: 110 MMOL/L (ref 98–112)
CO2 SERPL-SCNC: 21 MMOL/L (ref 21–32)
CREAT BLD-MCNC: 0.43 MG/DL
EGFRCR SERPLBLD CKD-EPI 2021: 124 ML/MIN/1.73M2 (ref 60–?)
EOSINOPHIL # BLD AUTO: 0.15 X10(3) UL (ref 0–0.7)
EOSINOPHIL NFR BLD AUTO: 2 %
ERYTHROCYTE [DISTWIDTH] IN BLOOD BY AUTOMATED COUNT: 13.2 %
GLOBULIN PLAS-MCNC: 3.6 G/DL (ref 2.8–4.4)
GLUCOSE BLD-MCNC: 92 MG/DL (ref 70–99)
HCT VFR BLD AUTO: 37.9 %
HGB BLD-MCNC: 13.1 G/DL
IMM GRANULOCYTES # BLD AUTO: 0.05 X10(3) UL (ref 0–1)
IMM GRANULOCYTES NFR BLD: 0.7 %
LYMPHOCYTES # BLD AUTO: 1.35 X10(3) UL (ref 1–4)
LYMPHOCYTES NFR BLD AUTO: 18.4 %
MCH RBC QN AUTO: 29.7 PG (ref 26–34)
MCHC RBC AUTO-ENTMCNC: 34.6 G/DL (ref 31–37)
MCV RBC AUTO: 85.9 FL
MONOCYTES # BLD AUTO: 0.83 X10(3) UL (ref 0.1–1)
MONOCYTES NFR BLD AUTO: 11.3 %
NEUTROPHILS # BLD AUTO: 4.92 X10 (3) UL (ref 1.5–7.7)
NEUTROPHILS # BLD AUTO: 4.92 X10(3) UL (ref 1.5–7.7)
NEUTROPHILS NFR BLD AUTO: 67.2 %
OSMOLALITY SERPL CALC.SUM OF ELEC: 283 MOSM/KG (ref 275–295)
P AXIS: 56 DEGREES
P-R INTERVAL: 132 MS
PLATELET # BLD AUTO: 99 10(3)UL (ref 150–450)
PLATELETS.RETICULATED NFR BLD AUTO: 6.2 % (ref 0–7)
POTASSIUM SERPL-SCNC: 3.8 MMOL/L (ref 3.5–5.1)
PROT SERPL-MCNC: 5.9 G/DL (ref 6.4–8.2)
Q-T INTERVAL: 432 MS
QRS DURATION: 76 MS
QTC CALCULATION (BEZET): 420 MS
R AXIS: 66 DEGREES
RBC # BLD AUTO: 4.41 X10(6)UL
SODIUM SERPL-SCNC: 138 MMOL/L (ref 136–145)
T AXIS: 36 DEGREES
VENTRICULAR RATE: 57 BPM
WBC # BLD AUTO: 7.3 X10(3) UL (ref 4–11)

## 2024-06-26 PROCEDURE — 74300 X-RAY BILE DUCTS/PANCREAS: CPT | Performed by: SURGERY

## 2024-06-26 PROCEDURE — 0FT44ZZ RESECTION OF GALLBLADDER, PERCUTANEOUS ENDOSCOPIC APPROACH: ICD-10-PCS | Performed by: SURGERY

## 2024-06-26 PROCEDURE — BF131ZZ FLUOROSCOPY OF GALLBLADDER AND BILE DUCTS USING LOW OSMOLAR CONTRAST: ICD-10-PCS | Performed by: SURGERY

## 2024-06-26 PROCEDURE — 3E0J3KZ INTRODUCTION OF OTHER DIAGNOSTIC SUBSTANCE INTO BILIARY AND PANCREATIC TRACT, PERCUTANEOUS APPROACH: ICD-10-PCS | Performed by: SURGERY

## 2024-06-26 RX ORDER — LIDOCAINE HYDROCHLORIDE 10 MG/ML
INJECTION, SOLUTION EPIDURAL; INFILTRATION; INTRACAUDAL; PERINEURAL AS NEEDED
Status: DISCONTINUED | OUTPATIENT
Start: 2024-06-26 | End: 2024-06-26 | Stop reason: SURG

## 2024-06-26 RX ORDER — HYDROMORPHONE HYDROCHLORIDE 1 MG/ML
0.4 INJECTION, SOLUTION INTRAMUSCULAR; INTRAVENOUS; SUBCUTANEOUS EVERY 5 MIN PRN
Status: DISCONTINUED | OUTPATIENT
Start: 2024-06-26 | End: 2024-06-26 | Stop reason: HOSPADM

## 2024-06-26 RX ORDER — DEXAMETHASONE SODIUM PHOSPHATE 4 MG/ML
VIAL (ML) INJECTION AS NEEDED
Status: DISCONTINUED | OUTPATIENT
Start: 2024-06-26 | End: 2024-06-26 | Stop reason: SURG

## 2024-06-26 RX ORDER — HYDROCODONE BITARTRATE AND ACETAMINOPHEN 5; 325 MG/1; MG/1
1 TABLET ORAL ONCE AS NEEDED
Status: DISCONTINUED | OUTPATIENT
Start: 2024-06-26 | End: 2024-06-26 | Stop reason: HOSPADM

## 2024-06-26 RX ORDER — GLYCOPYRROLATE 0.2 MG/ML
INJECTION, SOLUTION INTRAMUSCULAR; INTRAVENOUS AS NEEDED
Status: DISCONTINUED | OUTPATIENT
Start: 2024-06-26 | End: 2024-06-26 | Stop reason: SURG

## 2024-06-26 RX ORDER — SODIUM CHLORIDE, SODIUM LACTATE, POTASSIUM CHLORIDE, CALCIUM CHLORIDE 600; 310; 30; 20 MG/100ML; MG/100ML; MG/100ML; MG/100ML
INJECTION, SOLUTION INTRAVENOUS CONTINUOUS
Status: DISCONTINUED | OUTPATIENT
Start: 2024-06-26 | End: 2024-06-26 | Stop reason: HOSPADM

## 2024-06-26 RX ORDER — OXYCODONE HYDROCHLORIDE 5 MG/1
5 TABLET ORAL EVERY 4 HOURS PRN
Status: DISCONTINUED | OUTPATIENT
Start: 2024-06-26 | End: 2024-07-02

## 2024-06-26 RX ORDER — NALOXONE HYDROCHLORIDE 0.4 MG/ML
80 INJECTION, SOLUTION INTRAMUSCULAR; INTRAVENOUS; SUBCUTANEOUS AS NEEDED
Status: DISCONTINUED | OUTPATIENT
Start: 2024-06-26 | End: 2024-06-26 | Stop reason: HOSPADM

## 2024-06-26 RX ORDER — HYDROMORPHONE HYDROCHLORIDE 1 MG/ML
0.2 INJECTION, SOLUTION INTRAMUSCULAR; INTRAVENOUS; SUBCUTANEOUS EVERY 5 MIN PRN
Status: DISCONTINUED | OUTPATIENT
Start: 2024-06-26 | End: 2024-06-26 | Stop reason: HOSPADM

## 2024-06-26 RX ORDER — BUPIVACAINE HYDROCHLORIDE AND EPINEPHRINE 5; 5 MG/ML; UG/ML
INJECTION, SOLUTION EPIDURAL; INTRACAUDAL; PERINEURAL AS NEEDED
Status: DISCONTINUED | OUTPATIENT
Start: 2024-06-26 | End: 2024-06-26 | Stop reason: HOSPADM

## 2024-06-26 RX ORDER — NEOSTIGMINE METHYLSULFATE 1 MG/ML
INJECTION, SOLUTION INTRAVENOUS AS NEEDED
Status: DISCONTINUED | OUTPATIENT
Start: 2024-06-26 | End: 2024-06-26 | Stop reason: SURG

## 2024-06-26 RX ORDER — ROCURONIUM BROMIDE 10 MG/ML
INJECTION, SOLUTION INTRAVENOUS AS NEEDED
Status: DISCONTINUED | OUTPATIENT
Start: 2024-06-26 | End: 2024-06-26 | Stop reason: SURG

## 2024-06-26 RX ORDER — SODIUM CHLORIDE, SODIUM LACTATE, POTASSIUM CHLORIDE, CALCIUM CHLORIDE 600; 310; 30; 20 MG/100ML; MG/100ML; MG/100ML; MG/100ML
INJECTION, SOLUTION INTRAVENOUS CONTINUOUS PRN
Status: DISCONTINUED | OUTPATIENT
Start: 2024-06-26 | End: 2024-06-26 | Stop reason: SURG

## 2024-06-26 RX ORDER — HYDROMORPHONE HYDROCHLORIDE 1 MG/ML
INJECTION, SOLUTION INTRAMUSCULAR; INTRAVENOUS; SUBCUTANEOUS
Status: COMPLETED
Start: 2024-06-26 | End: 2024-06-26

## 2024-06-26 RX ORDER — ONDANSETRON 2 MG/ML
4 INJECTION INTRAMUSCULAR; INTRAVENOUS EVERY 6 HOURS PRN
Status: DISCONTINUED | OUTPATIENT
Start: 2024-06-26 | End: 2024-06-26 | Stop reason: HOSPADM

## 2024-06-26 RX ORDER — ONDANSETRON 2 MG/ML
INJECTION INTRAMUSCULAR; INTRAVENOUS AS NEEDED
Status: DISCONTINUED | OUTPATIENT
Start: 2024-06-26 | End: 2024-06-26 | Stop reason: SURG

## 2024-06-26 RX ORDER — HYDROMORPHONE HYDROCHLORIDE 1 MG/ML
0.6 INJECTION, SOLUTION INTRAMUSCULAR; INTRAVENOUS; SUBCUTANEOUS EVERY 5 MIN PRN
Status: DISCONTINUED | OUTPATIENT
Start: 2024-06-26 | End: 2024-06-26 | Stop reason: HOSPADM

## 2024-06-26 RX ORDER — ACETAMINOPHEN 500 MG
1000 TABLET ORAL ONCE AS NEEDED
Status: DISCONTINUED | OUTPATIENT
Start: 2024-06-26 | End: 2024-06-26 | Stop reason: HOSPADM

## 2024-06-26 RX ORDER — HYDROCODONE BITARTRATE AND ACETAMINOPHEN 5; 325 MG/1; MG/1
2 TABLET ORAL ONCE AS NEEDED
Status: DISCONTINUED | OUTPATIENT
Start: 2024-06-26 | End: 2024-06-26 | Stop reason: HOSPADM

## 2024-06-26 RX ADMIN — SODIUM CHLORIDE, SODIUM LACTATE, POTASSIUM CHLORIDE, CALCIUM CHLORIDE: 600; 310; 30; 20 INJECTION, SOLUTION INTRAVENOUS at 08:24:00

## 2024-06-26 RX ADMIN — ROCURONIUM BROMIDE 30 MG: 10 INJECTION, SOLUTION INTRAVENOUS at 07:49:00

## 2024-06-26 RX ADMIN — ONDANSETRON 4 MG: 2 INJECTION INTRAMUSCULAR; INTRAVENOUS at 10:27:00

## 2024-06-26 RX ADMIN — DEXAMETHASONE SODIUM PHOSPHATE 8 MG: 4 MG/ML VIAL (ML) INJECTION at 07:55:00

## 2024-06-26 RX ADMIN — SODIUM CHLORIDE: 9 INJECTION, SOLUTION INTRAVENOUS at 07:41:00

## 2024-06-26 RX ADMIN — ROCURONIUM BROMIDE 5 MG: 10 INJECTION, SOLUTION INTRAVENOUS at 08:41:00

## 2024-06-26 RX ADMIN — INDOCYANINE GREEN AND WATER 5 MG: 25 MG KIT INJECTION at 08:00:00

## 2024-06-26 RX ADMIN — SODIUM CHLORIDE, SODIUM LACTATE, POTASSIUM CHLORIDE, CALCIUM CHLORIDE: 600; 310; 30; 20 INJECTION, SOLUTION INTRAVENOUS at 09:05:00

## 2024-06-26 RX ADMIN — SODIUM CHLORIDE, SODIUM LACTATE, POTASSIUM CHLORIDE, CALCIUM CHLORIDE: 600; 310; 30; 20 INJECTION, SOLUTION INTRAVENOUS at 09:34:00

## 2024-06-26 RX ADMIN — GLYCOPYRROLATE 0.5 MG: 0.2 INJECTION, SOLUTION INTRAMUSCULAR; INTRAVENOUS at 10:45:00

## 2024-06-26 RX ADMIN — SODIUM CHLORIDE: 9 INJECTION, SOLUTION INTRAVENOUS at 08:03:00

## 2024-06-26 RX ADMIN — SODIUM CHLORIDE, SODIUM LACTATE, POTASSIUM CHLORIDE, CALCIUM CHLORIDE: 600; 310; 30; 20 INJECTION, SOLUTION INTRAVENOUS at 10:36:00

## 2024-06-26 RX ADMIN — SODIUM CHLORIDE, SODIUM LACTATE, POTASSIUM CHLORIDE, CALCIUM CHLORIDE: 600; 310; 30; 20 INJECTION, SOLUTION INTRAVENOUS at 08:15:00

## 2024-06-26 RX ADMIN — SODIUM CHLORIDE, SODIUM LACTATE, POTASSIUM CHLORIDE, CALCIUM CHLORIDE: 600; 310; 30; 20 INJECTION, SOLUTION INTRAVENOUS at 08:54:00

## 2024-06-26 RX ADMIN — LIDOCAINE HYDROCHLORIDE 50 MG: 10 INJECTION, SOLUTION EPIDURAL; INFILTRATION; INTRACAUDAL; PERINEURAL at 07:48:00

## 2024-06-26 RX ADMIN — SODIUM CHLORIDE, SODIUM LACTATE, POTASSIUM CHLORIDE, CALCIUM CHLORIDE: 600; 310; 30; 20 INJECTION, SOLUTION INTRAVENOUS at 10:12:00

## 2024-06-26 RX ADMIN — SODIUM CHLORIDE, SODIUM LACTATE, POTASSIUM CHLORIDE, CALCIUM CHLORIDE: 600; 310; 30; 20 INJECTION, SOLUTION INTRAVENOUS at 10:45:00

## 2024-06-26 RX ADMIN — NEOSTIGMINE METHYLSULFATE 3 MG: 1 INJECTION, SOLUTION INTRAVENOUS at 10:45:00

## 2024-06-26 NOTE — OPERATIVE REPORT
[facility]   Operative Note    Stephy Thu Angela De Oliveira Location: OR   CSN [id number] MRN [id number]   Admission Date 6/24/2024 Operation Date 6/26/2024   Attending Physician Moshe López MD Operating Physician Cheyanne Pereyra MD     Date of procedure:   June 26, 2024 cholelithiasis    Pre-Operative Diagnosis: Acute cholecystitis, cholelithiasis, resolved choledocholithiasis status post ERCP and stone extraction    Indication for Surgery: Symptomatic cholelithiasis    Post-Operative Diagnosis/Operative Findings: Same as above-negative intraoperative cholangiogram    Procedure performed:  Laparoscopic cholecystectomy with intraoperative cholangiogram and ICG    Surgeons and Role:     * Cheyanne Pereyra MD - Primary     * Isiah Wong DO - Assisting Surgeon    Anesthesia: General    History of Present Illness:        44-year-old female who was admitted through the emergency department with complaints of abdominal pain.    Interview was conducted with the assistance of  ID number [id number] as the patient is primarily Bengali speaking.  Workup in the emergency department revealed elevated LFTs with total bilirubin of 8.0.  CBC within normal limits except platelet count mildly diminished at 113  CT scan abdomen pelvis reveals cholelithiasis, biliary sludge extending into the common bile duct with biliary dilatation.  CBD measures 9 mm.  Cholelithiasis is also noted in the cystic duct.  Patient is also been seen by GI service.  She is scheduled for ERCP and stone extraction today.  Subsequently we did discuss proceeding with laparoscopic cholecystectomy with intraoperative cholangiogram for symptomatic cholelithiasis  The patient is comfortable with these treatment recommendations.  She has no further questions at this time and will proceed as discussed  The risks, benefits, complications, possible outcomes and alternatives of the procedure were explained to the patient in detail.  Potential  complications of this procedure and as with any surgical procedure and anesthesia were reviewed including but not limited to bleeding, infection, thromboembolic event, pneumonia were discussed.    Patient did undergo EGD and ERCP.  CBD and CHD were dilated with multiple filling defects consistent with choledocholithiasis.  [place] of the right and left hepatic ducts and intrahepatic radicles appeared normal.    Patient does wish to proceed with laparoscopic cholecystectomy with intraoperative cholangiogram today.  She has no further questions at this time.        Discussed with patient:  The potential risks and benefits of the procedure were discussed in detail with the patient including but not limited to bleeding, infections, seroma/hematoma formation, postoperative wound complications including development of a hernia, trocar injury, intra-abdominal organ injury, bile leakage, biloma formation, common bile duct injury, conversion to an open procedure, inability to complete the cholangiogram, possible need for a drain, possible recurrence or persistence of symptoms despite surgery, postoperative diarrhea, possible need for further treatment or intervention pending cholangiogram results.  These and other potential intraoperative and perioperative risks including but not limited to thromboembolic events were discussed and the patient does not have any questions and does wish to proceed with surgery today.    Note:  A surgical assistant was essential to the performance and conduct of this case, especially in the performance of the cholangiogram and the careful dissection needed in and around the triangle of Calot and gallbladder hilum.    Summary of Procedure:   The patient was taken to the operating room and was placed on the OR table in the supine position. After the induction of general anesthesia perioperative antibiotics were given. The patient was then prepped and draped in usual sterile fashion. Using local  anesthesia a grabiel-umbilical incision was made and the anterior abdominal fascia was elevated with a Kocker forcep. The Veress needle was introduced into the abdomen and the abdomen was insufflated to 15 mm mercury. The Veress needle was then exchanged for a 11 mm port. The laparoscope was introduced.  A 5 mm epigastric port and two 5 mm lateral ports were placed under direct vision without incidence.  The patient was placed into a reverse Trendelenburg position with the right side up.      Initial evaluation of the right upper quadrant revealed the gallbladder to be contracted, the wall was noted to be fibrotic and thickened consistent with chronic cholecystitis.  Omental adhesions were noted to be obscuring Parsons's pouch..  The gallbladder was grasped at the fundus and elevated superiorly.   The omental adhesions were dissected down using electrocautery to bring the Parsons's pouch into view.  Parsons's pouch was identified and this was retracted laterally to expose the triangle of Calot.  Dissection in the cystic duct-gallbladder junction was performed to define the cystic duct for sufficient length.   An additional 5 mm port was placed in the right upper quadrant for placement of a fan retractor so that the duodenum could be retracted inferiorly for visualization of the of the cystic duct gallbladder junction.  This dissection was made significantly more difficult by the degree of inflammatory changes in this region.  Additionally, the gallbladder was nearly immobile due to the fibrotic scar tissue.  Tedious dissection was performed with Tuthill dissection and blunt peanut dissection to clearly define the cystic duct gallbladder junction.  ICG was given preoperatively however the cystic duct and the common bile duct did not light up with fluoroscopy.  Therefore it was decided that an intraoperative x-ray cholangiogram would be performed for anatomy.   Dissection was kept above the line of Rouviere and a critical  view was obtained.  The cystic duct was then clipped once proximally on the specimen side.    The cystic duct was noted to be extremely dilated.  A ductotomy was made.  A cholangiocatheter was introduced through the lateral 5 mm port.   The cholangiocatheter was introduced into the cystic duct.  An intraoperative cholangiogram was then performed. There was no evidence of a filling defect in the cystic or the common bile duct. The common bile duct tapers down smoothly to the duodenum and there was free flow of contrast into the duodenum. The proximal common hepatic duct was visualized  without any evidence of a filling defects or other abnormalities.  The intrahepatic ducts could not be visualized despite multiple attempts to flush contrast proximally into the intrahepatic ducts.  The confluence of the cystic duct and the common bile duct was seen.  Representative images were submitted to the Radiology Department for a final read.  The cholangiocatheter was then removed.    Due to the large size of the cystic duct, the epigastric port was upsized to an 11 mm trocar.  Subsequently the nondisposable large clip applier was able to be inserted through the epigastric port and the cystic duct was then clipped once distally.  This clip also just barely traversed the lumen of the cystic duct.  Therefore the decision was made to use an Endoloop to control the cystic duct stump   The cystic duct was then divided.  The Endoloop was then placed across the cystic duct just distal to the previously placed clip.  The cystic artery was identified and seen  to ascend onto the gallbladder wall.  This was also defined for a sufficient length and was clipped twice proximally and once distally and divided. Electrocautery was then used to dissect the gallbladder away from the liver bed.  This dissection was made significantly more difficult by the degree of fibrosis and chronic inflammatory changes.  Having completed this maneuver the  gallbladder was placed into a Endopouch and was withdrawn through the umbilical port.  The umbilical port site required extension to accommodate the large gallbladder.  The right upper quadrant was copiously irrigated until the irrigant was clear. The clips across the cystic duct and the cystic artery were examined and found to be well approximated and in good position.  The Endoloop was also seen to be in place.  There was no evidence of bleeding or bile leakage from the liver bed.  Due to the degree of inflammatory changes, a Nicko drain was placed into the right upper quadrant and exited through the lateral 5 mm port.  The Nicko drain was secured to the skin with nylon suture.  The accessory ports were removed under direct vision and there was no evidence of bleeding from the anterior abdominal wall. The abdomen was then deflated. The umbilical port was closed with 0 Vicryl.  All skin incisions were closed with 4-0 Vicryl in a subcuticular manner.    Steri-Strips and sterile dressings were placed.  All sponge, instrument and needle counts were correct at the conclusion of the procedure. The patient did tolerate the procedure well.  The patient was taken to the recovery room in stable condition.    EBL:   Minimal     Drain-Nicko drain x 1      Pathologic specimens: The gallbladder and its contents    LITA Pereyra MD, FACS      Please note that this report has been produced using speech recognition software and may contain errors related to that system including but not limited to errors in grammar, punctuation and spelling as well as words and phrases that possibly may have been recognized inappropriately.  If there are any questions or concerns please contact the dictating provider for clarification.  The 21st Century Cures Act makes medical notes like these available to patients in the interest of trans parency. Please be advised this is a medical document. Medical documents are intended to carry relevant  information, facts as evident, and the clinical opinion of the practitioner. The medical note is intended as peer to peer communication and may appear blunt or direct. It is written in medical language and may contain abbreviations or verbiage that are unfamiliar.  If there are any questions or concerns please contact the dictating provider for clarification.

## 2024-06-26 NOTE — PLAN OF CARE
Pt received A&Ox4. Afebrile. VSS, SB on tele. Pt to OR this AM for lap khalif. Received pt back to unit this afternoon, drowsy. Declining prn pain meds. Lap sites c/d/i, old drainage noted. STACIE drain w/ sanguineous output. Started on CLD per gen surg, orders to advance as tolerated. Encouraging PO intake. Seen by GI - plan for f/u KUB in 1 wk. IVF infusing @ 100ml/hr. IV zosyn per MAR. Fall precautions in place. Call light in reach.

## 2024-06-26 NOTE — ANESTHESIA POSTPROCEDURE EVALUATION
[facility] Thu Angela De Oliveira Patient Status:  Inpatient   Age/Gender 44 year old female MRN [id number]   Location [facility] SURGERY Attending Moshe López MD   Hosp Day # 1 PCP None Pcp       Anesthesia Post-op Note    Laparoscopic cholecystectomy with indocyanine green and cholangiogram    Procedure Summary       Date: 06/26/24 Room / Location:  MAIN OR  /  MAIN OR    Anesthesia Start: 0741 Anesthesia Stop: 1104    Procedure: Laparoscopic cholecystectomy with indocyanine green and cholangiogram (Abdomen) Diagnosis: (cholelithiasis, dissolved choledocolithiasis, acute chronic cholecistitis)    Surgeons: Cheyanne Pereyra MD Anesthesiologist: William Guan MD    Anesthesia Type: general ASA Status: 2            Anesthesia Type: general    Vitals Value Taken Time   /67 06/26/24 1105   Temp 98.1 06/26/24 1105   Pulse 67 06/26/24 1105   Resp 18 06/26/24 1105   SpO2 99% (RA) 06/26/24 1105       Patient Location: PACU    Anesthesia Type: general    Airway Patency: patent    Postop Pain Control: adequate    Mental Status: preanesthetic baseline    Nausea/Vomiting: none    Cardiopulmonary/Hydration status: stable euvolemic    Complications: no apparent anesthesia related complications    Postop vital signs: stable    Dental Exam: Unchanged from Preop    Patient to be discharged from PACU when criteria met.

## 2024-06-26 NOTE — PROGRESS NOTES
Gastroenterology Progress Note  Stephy De Oliveira Patient Status:  Inpatient    1980 MRN [id number]   Location [facility] 4NW-A Attending Moshe López MD   Hosp Day # 1 PCP None Pcp     Chief Complaint: Choledocholithiasis   S: Sleepy now--s/p laparoscopic cholecystectomy this AM. No abd pain and no overt GI bleeding. No fevers  O: /66 (BP Location: Left arm)   Pulse 52   Temp 97.6 °F (36.4 °C) (Temporal)   Resp 15   Wt 135 lb (61.2 kg)   SpO2 99%   Gen: Drowsy, easily arouses    CV: Bradycardic, regular   Resp: CTA bilaterally; No increase in respiratory effort   Abd: (+)BS, soft, non-tender, mildly distended; no rebound or guarding; incisions without drainage  Ext: No edema or cyanosis  Skin: Warm and dry  Laboratory Data:     No results for input(s): \"PGLU\" in the last 168 hours.  No results for input(s): \"INR\" in the last 168 hours.      Recent Labs   Lab 24  0020 24  0635   WBC 9.1 7.3   HGB 15.1 13.1   .0* 99.0*       Recent Labs   Lab 24  0020 24  0720 24  0635    137 138   K 4.4 3.6 3.8    108 110   CO2 28.0 24.0 21.0   BUN 7* 6* 6*   CREATSERUM 0.76 0.59 0.43*       Recent Labs   Lab 24  0020 24  0720 24  0635   * 565* 455*   * 224* 148*     Assessment:  44 yr-old female with hx of ovarian cysts s/p surgical (10+ yrs ago) admitted  with a 3 day hx of abd pain which progressed to nausea and poor PO intake found to have choledocholithiasis + acute on chronic cholecystitis s/p ERCP () and today underwent lap cholecystectomy.   No signs of overt GI bleeding and no signs of acute pancreatitis. LFTs improving. Will need xray in 1 week to assess for PD stent displacement   Plan:   OK to advance diet per general surgery recommendations   Pain management per Hospitalist recommendations/antiemetics as needed   KUB ordered to be done in 1 week to assess for  PD stent---if present then will need EGD for removal  OK to be discharged from a GI perspective   Case discussed with Dr NICHO Huerta, APRN  1:46 PM  6/26/2024  [facility]  [phone]    GI Attending Attestation    I have personally reviewed the above history, physical exam, but have not personally examined the patient. I agree with the stated treatment plan.     10:41 PM  6/26/2024  Jason Boyce, DO

## 2024-06-26 NOTE — PLAN OF CARE
Pt received A&Ox4, Maltese speaking. VSS. RA. Tele. Afebrile. Denies pain. Medications given per MAR, receiving Zosyn. IVF. Call light within reach. NPO @ MN.    Problem: GASTROINTESTINAL - ADULT  Goal: Minimal or absence of nausea and vomiting  Description: INTERVENTIONS:  - Maintain adequate hydration with IV or PO as ordered and tolerated  - Nasogastric tube to low intermittent suction as ordered  - Evaluate effectiveness of ordered antiemetic medications  - Provide nonpharmacologic comfort measures as appropriate  - Advance diet as tolerated, if ordered  - Obtain nutritional consult as needed  - Evaluate fluid balance  6/26/2024 0257 by Tasneem Batista RN  Outcome: Progressing  6/25/2024 2135 by Tasneem Btaista RN  Outcome: Progressing  Goal: Maintains or returns to baseline bowel function  Description: INTERVENTIONS:  - Assess bowel function  - Maintain adequate hydration with IV or PO as ordered and tolerated  - Evaluate effectiveness of GI medications  - Encourage mobilization and activity  - Obtain nutritional consult as needed  - Establish a toileting routine/schedule  - Consider collaborating with pharmacy to review patient's medication profile  6/26/2024 0257 by Tasneem Batista RN  Outcome: Progressing  6/25/2024 2135 by Tasneem Batista RN  Outcome: Progressing     Problem: Patient/Family Goals  Goal: Patient/Family Long Term Goal  Description: Patient's Long Term Goal: Discharge  Interventions:  - See additional Care Plan goals for specific interventions  6/26/2024 0257 by Tasneem Batista RN  Outcome: Progressing  6/25/2024 2135 by Tasneem Batista RN  Outcome: Progressing  Goal: Patient/Family Short Term Goal  Description: Patient's Short Term Goal: \"Have surgery go well\"    Interventions:   - NPO @ MN  - Verbalize understanding on surgical procedure  - See additional Care Plan goals for specific interventions  6/26/2024 0257 by Tasneem Batista RN  Outcome: Progressing  6/25/2024 2135 by  Tasneem Batista, RN  Outcome: Progressing

## 2024-06-26 NOTE — BRIEF OP NOTE
Pre-Operative Diagnosis: severe acute on chronic  cholecystitis, cholelithiasis, resolved choledocolithiasis     Post-Operative Diagnosis: same      Procedure Performed:   Laparoscopic cholecystectomy with indocyanine green and cholangiogram    Surgeons and Role:     * Cheyanne Pereyra MD - Primary     * Isiah Wong DO - Assisting Surgeon    Assistant(s):  PA: Vidhi Hewitt PA-C     Surgical Findings: neg IOC     Specimen: GB and stones     Estimated Blood Loss: Blood Output: 40 mL (6/26/2024 10:41 AM)      Dictation Number:      Cheyanne Pereyra MD  6/26/2024  12:33 PM

## 2024-06-26 NOTE — ANESTHESIA PROCEDURE NOTES
Airway  Date/Time: 6/26/2024 7:50 AM  Urgency: elective    Airway not difficult    General Information and Staff    Patient location during procedure: OR  Anesthesiologist: William Guan MD  Performed: anesthesiologist   Performed by: William Guan MD  Authorized by: William Guan MD      Indications and Patient Condition  Indications for airway management: anesthesia  Sedation level: deep  Preoxygenated: yes  Patient position: sniffing  Mask difficulty assessment: 1 - vent by mask    Final Airway Details  Final airway type: endotracheal airway      Successful airway: ETT  Cuffed: yes   Successful intubation technique: direct laryngoscopy  Facilitating devices/methods: intubating stylet  Endotracheal tube insertion site: oral  Blade: Ryan  Blade size: #3  ETT size (mm): 7.0    Cormack-Lehane Classification: grade I - full view of glottis  Placement verified by: capnometry   Measured from: lips  ETT to lips (cm): 21  Number of attempts at approach: 1        
gradual onset

## 2024-06-27 ENCOUNTER — APPOINTMENT (OUTPATIENT)
Dept: NUCLEAR MEDICINE | Facility: HOSPITAL | Age: 44
DRG: 409 | End: 2024-06-27
Attending: PHYSICIAN ASSISTANT
Payer: COMMERCIAL

## 2024-06-27 ENCOUNTER — APPOINTMENT (OUTPATIENT)
Dept: MRI IMAGING | Facility: HOSPITAL | Age: 44
DRG: 409 | End: 2024-06-27
Payer: COMMERCIAL

## 2024-06-27 ENCOUNTER — ANESTHESIA EVENT (OUTPATIENT)
Dept: ENDOSCOPY | Facility: HOSPITAL | Age: 44
DRG: 409 | End: 2024-06-27
Payer: COMMERCIAL

## 2024-06-27 ENCOUNTER — APPOINTMENT (OUTPATIENT)
Dept: GENERAL RADIOLOGY | Facility: HOSPITAL | Age: 44
DRG: 409 | End: 2024-06-27
Attending: INTERNAL MEDICINE
Payer: COMMERCIAL

## 2024-06-27 ENCOUNTER — ANESTHESIA (OUTPATIENT)
Dept: ENDOSCOPY | Facility: HOSPITAL | Age: 44
DRG: 409 | End: 2024-06-27
Payer: COMMERCIAL

## 2024-06-27 PROBLEM — K80.50 CHOLEDOCHOLITHIASIS: Status: ACTIVE | Noted: 2024-06-27

## 2024-06-27 LAB
ALBUMIN SERPL-MCNC: 2.4 G/DL (ref 3.4–5)
ALBUMIN/GLOB SERPL: 0.7 {RATIO} (ref 1–2)
ALP LIVER SERPL-CCNC: 100 U/L
ALT SERPL-CCNC: 454 U/L
ANION GAP SERPL CALC-SCNC: 6 MMOL/L (ref 0–18)
AST SERPL-CCNC: 156 U/L (ref 15–37)
BASOPHILS # BLD AUTO: 0.02 X10(3) UL (ref 0–0.2)
BASOPHILS NFR BLD AUTO: 0.1 %
BILIRUB SERPL-MCNC: 3.1 MG/DL (ref 0.1–2)
BUN BLD-MCNC: 5 MG/DL (ref 9–23)
CALCIUM BLD-MCNC: 7.8 MG/DL (ref 8.5–10.1)
CHLORIDE SERPL-SCNC: 106 MMOL/L (ref 98–112)
CO2 SERPL-SCNC: 25 MMOL/L (ref 21–32)
CREAT BLD-MCNC: 0.5 MG/DL
EGFRCR SERPLBLD CKD-EPI 2021: 120 ML/MIN/1.73M2 (ref 60–?)
EOSINOPHIL # BLD AUTO: 0.03 X10(3) UL (ref 0–0.7)
EOSINOPHIL NFR BLD AUTO: 0.2 %
ERYTHROCYTE [DISTWIDTH] IN BLOOD BY AUTOMATED COUNT: 13.2 %
GLOBULIN PLAS-MCNC: 3.3 G/DL (ref 2.8–4.4)
GLUCOSE BLD-MCNC: 93 MG/DL (ref 70–99)
HCT VFR BLD AUTO: 36.4 %
HGB BLD-MCNC: 12.5 G/DL
IMM GRANULOCYTES # BLD AUTO: 0.06 X10(3) UL (ref 0–1)
IMM GRANULOCYTES NFR BLD: 0.4 %
LYMPHOCYTES # BLD AUTO: 1.4 X10(3) UL (ref 1–4)
LYMPHOCYTES NFR BLD AUTO: 9.1 %
MAGNESIUM SERPL-MCNC: 1.7 MG/DL (ref 1.6–2.6)
MCH RBC QN AUTO: 29.9 PG (ref 26–34)
MCHC RBC AUTO-ENTMCNC: 34.3 G/DL (ref 31–37)
MCV RBC AUTO: 87.1 FL
MONOCYTES # BLD AUTO: 1.05 X10(3) UL (ref 0.1–1)
MONOCYTES NFR BLD AUTO: 6.8 %
NEUTROPHILS # BLD AUTO: 12.89 X10 (3) UL (ref 1.5–7.7)
NEUTROPHILS # BLD AUTO: 12.89 X10(3) UL (ref 1.5–7.7)
NEUTROPHILS NFR BLD AUTO: 83.4 %
OSMOLALITY SERPL CALC.SUM OF ELEC: 281 MOSM/KG (ref 275–295)
PLATELET # BLD AUTO: 85 10(3)UL (ref 150–450)
POTASSIUM SERPL-SCNC: 3.5 MMOL/L (ref 3.5–5.1)
PROT SERPL-MCNC: 5.7 G/DL (ref 6.4–8.2)
RBC # BLD AUTO: 4.18 X10(6)UL
SODIUM SERPL-SCNC: 137 MMOL/L (ref 136–145)
WBC # BLD AUTO: 15.5 X10(3) UL (ref 4–11)

## 2024-06-27 PROCEDURE — 76376 3D RENDER W/INTRP POSTPROCES: CPT

## 2024-06-27 PROCEDURE — BF101ZZ FLUOROSCOPY OF BILE DUCTS USING LOW OSMOLAR CONTRAST: ICD-10-PCS | Performed by: INTERNAL MEDICINE

## 2024-06-27 PROCEDURE — 78226 HEPATOBILIARY SYSTEM IMAGING: CPT | Performed by: PHYSICIAN ASSISTANT

## 2024-06-27 PROCEDURE — XFJB8A7 INSPECTION OF HEPATOBILIARY DUCT USING SINGLE-USE DUODENOSCOPE, NEW TECHNOLOGY GROUP 7: ICD-10-PCS | Performed by: INTERNAL MEDICINE

## 2024-06-27 PROCEDURE — 78830 RP LOCLZJ TUM SPECT W/CT 1: CPT | Performed by: PHYSICIAN ASSISTANT

## 2024-06-27 PROCEDURE — 74328 X-RAY BILE DUCT ENDOSCOPY: CPT | Performed by: INTERNAL MEDICINE

## 2024-06-27 PROCEDURE — 74183 MRI ABD W/O CNTR FLWD CNTR: CPT

## 2024-06-27 PROCEDURE — 0F798ZZ DILATION OF COMMON BILE DUCT, VIA NATURAL OR ARTIFICIAL OPENING ENDOSCOPIC: ICD-10-PCS | Performed by: INTERNAL MEDICINE

## 2024-06-27 PROCEDURE — 99232 SBSQ HOSP IP/OBS MODERATE 35: CPT | Performed by: HOSPITALIST

## 2024-06-27 RX ORDER — HYDROMORPHONE HYDROCHLORIDE 1 MG/ML
0.6 INJECTION, SOLUTION INTRAMUSCULAR; INTRAVENOUS; SUBCUTANEOUS EVERY 5 MIN PRN
Status: DISCONTINUED | OUTPATIENT
Start: 2024-06-27 | End: 2024-06-27 | Stop reason: HOSPADM

## 2024-06-27 RX ORDER — NALOXONE HYDROCHLORIDE 0.4 MG/ML
0.08 INJECTION, SOLUTION INTRAMUSCULAR; INTRAVENOUS; SUBCUTANEOUS AS NEEDED
Status: DISCONTINUED | OUTPATIENT
Start: 2024-06-27 | End: 2024-06-27 | Stop reason: HOSPADM

## 2024-06-27 RX ORDER — HYDROMORPHONE HYDROCHLORIDE 1 MG/ML
0.2 INJECTION, SOLUTION INTRAMUSCULAR; INTRAVENOUS; SUBCUTANEOUS EVERY 2 HOUR PRN
Status: DISCONTINUED | OUTPATIENT
Start: 2024-06-27 | End: 2024-07-02

## 2024-06-27 RX ORDER — SODIUM CHLORIDE, SODIUM LACTATE, POTASSIUM CHLORIDE, CALCIUM CHLORIDE 600; 310; 30; 20 MG/100ML; MG/100ML; MG/100ML; MG/100ML
INJECTION, SOLUTION INTRAVENOUS CONTINUOUS PRN
Status: DISCONTINUED | OUTPATIENT
Start: 2024-06-27 | End: 2024-06-27 | Stop reason: SURG

## 2024-06-27 RX ORDER — HYDROMORPHONE HYDROCHLORIDE 1 MG/ML
0.4 INJECTION, SOLUTION INTRAMUSCULAR; INTRAVENOUS; SUBCUTANEOUS EVERY 5 MIN PRN
Status: DISCONTINUED | OUTPATIENT
Start: 2024-06-27 | End: 2024-06-27 | Stop reason: HOSPADM

## 2024-06-27 RX ORDER — ONDANSETRON 2 MG/ML
4 INJECTION INTRAMUSCULAR; INTRAVENOUS EVERY 6 HOURS PRN
Status: DISCONTINUED | OUTPATIENT
Start: 2024-06-27 | End: 2024-06-27 | Stop reason: HOSPADM

## 2024-06-27 RX ORDER — SODIUM CHLORIDE, SODIUM LACTATE, POTASSIUM CHLORIDE, CALCIUM CHLORIDE 600; 310; 30; 20 MG/100ML; MG/100ML; MG/100ML; MG/100ML
INJECTION, SOLUTION INTRAVENOUS CONTINUOUS
Status: DISCONTINUED | OUTPATIENT
Start: 2024-06-27 | End: 2024-06-29

## 2024-06-27 RX ORDER — ENOXAPARIN SODIUM 100 MG/ML
40 INJECTION SUBCUTANEOUS DAILY
Status: DISCONTINUED | OUTPATIENT
Start: 2024-06-27 | End: 2024-07-02

## 2024-06-27 RX ORDER — HYDROMORPHONE HYDROCHLORIDE 1 MG/ML
0.2 INJECTION, SOLUTION INTRAMUSCULAR; INTRAVENOUS; SUBCUTANEOUS EVERY 5 MIN PRN
Status: DISCONTINUED | OUTPATIENT
Start: 2024-06-27 | End: 2024-06-27 | Stop reason: HOSPADM

## 2024-06-27 RX ORDER — HYDROMORPHONE HYDROCHLORIDE 1 MG/ML
0.4 INJECTION, SOLUTION INTRAMUSCULAR; INTRAVENOUS; SUBCUTANEOUS EVERY 2 HOUR PRN
Status: DISCONTINUED | OUTPATIENT
Start: 2024-06-27 | End: 2024-07-02

## 2024-06-27 RX ORDER — PROCHLORPERAZINE EDISYLATE 5 MG/ML
5 INJECTION INTRAMUSCULAR; INTRAVENOUS EVERY 8 HOURS PRN
Status: DISCONTINUED | OUTPATIENT
Start: 2024-06-27 | End: 2024-06-27 | Stop reason: HOSPADM

## 2024-06-27 RX ORDER — MAGNESIUM SULFATE HEPTAHYDRATE 40 MG/ML
2 INJECTION, SOLUTION INTRAVENOUS ONCE
Status: COMPLETED | OUTPATIENT
Start: 2024-06-27 | End: 2024-06-27

## 2024-06-27 RX ORDER — LIDOCAINE HYDROCHLORIDE 10 MG/ML
INJECTION, SOLUTION EPIDURAL; INFILTRATION; INTRACAUDAL; PERINEURAL AS NEEDED
Status: DISCONTINUED | OUTPATIENT
Start: 2024-06-27 | End: 2024-06-27 | Stop reason: SURG

## 2024-06-27 RX ORDER — HYDROMORPHONE HYDROCHLORIDE 1 MG/ML
0.1 INJECTION, SOLUTION INTRAMUSCULAR; INTRAVENOUS; SUBCUTANEOUS EVERY 2 HOUR PRN
Status: DISCONTINUED | OUTPATIENT
Start: 2024-06-27 | End: 2024-07-02

## 2024-06-27 RX ADMIN — LIDOCAINE HYDROCHLORIDE 50 MG: 10 INJECTION, SOLUTION EPIDURAL; INFILTRATION; INTRACAUDAL; PERINEURAL at 17:46:00

## 2024-06-27 RX ADMIN — SODIUM CHLORIDE, SODIUM LACTATE, POTASSIUM CHLORIDE, CALCIUM CHLORIDE: 600; 310; 30; 20 INJECTION, SOLUTION INTRAVENOUS at 17:47:00

## 2024-06-27 NOTE — PROGRESS NOTES
No change , will advance to full liquids this am. Oxycodone x1 for pain with effect. Incision intact, STACIE draining sanguineous fluid. Iv fluids continued.

## 2024-06-27 NOTE — ANESTHESIA POSTPROCEDURE EVALUATION
[facility]    Stephy Thu Angela De Oliveira Patient Status:  Inpatient   Age/Gender 44 year old female MRN [id number]   Location [facility] Attending Moshe López MD   Hosp Day # 2 PCP None Pcp       Anesthesia Post-op Note    ENDOSCOPIC RETROGRADE CHOLANGIOPANCREATOGRAPHY (ERCP)    Procedure Summary       Date: 06/27/24 Room / Location:  ENDOSCOPY 01 / [facility]    Anesthesia Start: 1746 Anesthesia Stop: 1820    Procedure: ENDOSCOPIC RETROGRADE CHOLANGIOPANCREATOGRAPHY (ERCP) Diagnosis: (bile leak)    Surgeons: Ameya Desai DO Anesthesiologist: Christiano Marti MD    Anesthesia Type: MAC ASA Status: 2            Anesthesia Type: MAC    Vitals Value Taken Time   BP 84/50 06/27/24 1820   Temp 98.3 °F (36.8 °C) 06/27/24 1820   Pulse 102 06/27/24 1820   Resp 16 06/27/24 1820   SpO2 95 % 06/27/24 1820       Patient Location: Endoscopy    Anesthesia Type: MAC    Airway Patency: patent    Postop Pain Control: adequate    Mental Status: mildly sedated but able to meaningfully participate in the post-anesthesia evaluation    Nausea/Vomiting: none    Cardiopulmonary/Hydration status: stable euvolemic    Complications: no apparent anesthesia related complications    Postop vital signs: stable    Dental Exam: Unchanged from Preop    Patient to be discharged from PACU when criteria met.

## 2024-06-27 NOTE — CM/SW NOTE
SW discussed patient during morning RN rounds. There are currently no identified discharge planning or case management needs at this time.     SW will continue to follow for plan of care changes and remain available for any additional DC needs or concerns.     Sharyn Mak MSW, LSW  Discharge Planner   [id number]

## 2024-06-27 NOTE — PAYOR COMM NOTE
6/25 THRU 6/27  ADMISSION REVIEW     Payor: [employer] OUT OF STATE POS/MCNP  Subscriber #:  [id number]  Authorization Number: [id number]    Admit date: 6/25/24  Admit time:  2:12 PM       REVIEW DOCUMENTATION:     ED Provider Notes        ED Provider Notes signed by Jason Gregory MD at 6/25/2024  3:10 AM       Author: Jason Gregory MD Service: -- Author Type: Physician    Filed: 6/25/2024  3:10 AM Date of Service: 6/25/2024 12:04 AM Status: Signed    : Jason Gregory MD (Physician)           Patient Seen in: [facility] [place] Emergency Department      History     Chief Complaint   Patient presents with    Abdomen/Flank Pain    Jaundice     Stated Complaint: abd pain, jaundice    Subjective:   HPI    44-year-old female presenting with abdominal pain in the epigastric region for the last several days no fevers chills no cough or cold no other exacerbating factors no surgical history on her abdomen no other exacerbating factors or associated symptoms    Objective:   Past Medical History:    Ovarian cyst              History reviewed. No pertinent surgical history.             Social History     Socioeconomic History    Marital status:    Tobacco Use    Smoking status: Never    Smokeless tobacco: Never   Substance and Sexual Activity    Alcohol use: Yes     Comment: socially    Drug use: Never              Review of Systems    Positive for stated complaint: abd pain, jaundice  Other systems are as noted in HPI.  Constitutional and vital signs reviewed.      All other systems reviewed and negative except as noted above.    Physical Exam     ED Triage Vitals [06/24/24 1949]   /76   Pulse 65   Resp 18   Temp 98.3 °F (36.8 °C)   Temp src Oral   SpO2 97 %   O2 Device None (Room air)       Current Vitals:   Vital Signs  BP: 101/69  Pulse: 74  Resp: 18  Temp: 98.3 °F (36.8 °C)  Temp src: Oral    Oxygen Therapy  SpO2: 100 %  O2 Device: None (Room air)            Physical Exam    Awake alert patient  appears no distress HEENT exam is normal lungs are clear cardiovascular exam shows regular rhythm abdomen soft nontender extremities no Cyanosis or edema no rash back exam is normal skin is nondiaphoretic no focal neurologic deficits    ED Course     Labs Reviewed   COMP METABOLIC PANEL (14) - Abnormal; Notable for the following components:       Result Value    BUN 7 (*)      (*)      (*)     Alkaline Phosphatase 163 (*)     Bilirubin, Total 8.0 (*)     Total Protein 8.5 (*)     Globulin  5.0 (*)     A/G Ratio 0.7 (*)     All other components within normal limits   URINALYSIS, ROUTINE - Abnormal; Notable for the following components:    Clarity Urine Turbid (*)     Ketones Urine 80 (*)     Protein Urine Trace (*)     Urobilinogen Urine 2 (*)     RBC Urine 3-5 (*)     Bacteria Urine Rare (*)     Squamous Epi. Cells Few (*)     All other components within normal limits   ICTOTEST - Abnormal; Notable for the following components:    Ictotest Positive (*)     All other components within normal limits   CBC W/ DIFFERENTIAL - Abnormal; Notable for the following components:    .0 (*)     All other components within normal limits   LIPASE - Normal   POCT PREGNANCY URINE - Normal   CBC WITH DIFFERENTIAL WITH PLATELET    Narrative:     The following orders were created for panel order CBC With Differential With Platelet.  Procedure                               Abnormality         Status                     ---------                               -----------         ------                     CBC W/ DIFFERENTIAL[[id number]]          Abnormal            Final result                 Please view results for these tests on the individual orders.   RAINBOW DRAW LAVENDER   RAINBOW DRAW LIGHT GREEN     EKG    Rate, intervals and axes as noted on EKG Report.  Rate: 57    Rhythm: sinus  Reading: No areas of acute ST segment elevation or depression                 Differential diagnosis includes perforated viscus  obstruction        MDM        Admission disposition: 6/25/2024  2:15 AM                                        Medical Decision Making  44-year-old female present with epigastric pain IV established cardiac monitor shows sinus rhythm pulse ox shows no signs of hypoxia.  CBC shows slightly elevated white cell metabolic panel shows elevated liver enzymes elevated bilirubin.  Independent interpretation by ED physician CT scan shows gallstones with dilated common bile duct.  Patient will be admitted with GI consultation and surgical consultation.    Problems Addressed:  Elevated bilirubin: acute illness or injury  Elevated liver enzymes: acute illness or injury    Amount and/or Complexity of Data Reviewed  Labs: ordered. Decision-making details documented in ED Course.  Radiology: ordered and independent interpretation performed. Decision-making details documented in ED Course.  ECG/medicine tests: ordered and independent interpretation performed. Decision-making details documented in ED Course.        Disposition and Plan     Clinical Impression:  1. Elevated liver enzymes    2. Elevated bilirubin         Disposition:  Admit  6/25/2024  2:15 am    Follow-up:  No follow-up provider specified.        Medications Prescribed:  Current Discharge Medication List                            Hospital Problems       Present on Admission  Date Reviewed: 6/25/2024            ICD-10-CM Noted POA    * (Principal) Elevated liver enzymes R74.8 6/25/2024 Unknown    Elevated bilirubin R17 6/25/2024 Unknown                     Signed by Jason Gregory MD on 6/25/2024  3:10 AM         MEDICATIONS ADMINISTERED IN LAST 1 DAY:  enoxaparin (Lovenox) 40 MG/0.4ML SUBQ injection 40 mg       Date Action Dose Route User    6/27/2024 0857 Given 40 mg Subcutaneous (Left Upper Abdomen) Ileana Suggs, RN          oxyCODONE immediate release tab 5 mg       Date Action Dose Route User    6/26/2024 2218 Given 5 mg Oral Josselyn Delacruz RN           pantoprazole (Protonix) DR tab 20 mg       Date Action Dose Route User    6/27/2024 0641 Given 20 mg Oral Josselyn Delacruz RN          piperacillin-tazobactam (Zosyn) 3.375 g in dextrose 5% 100 mL IVPB-ADDV       Date Action Dose Route User    6/27/2024 0641 New Bag 3.375 g Intravenous Josselyn Delacruz RN    6/26/2024 2218 New Bag 3.375 g Intravenous Josselyn Delacruz RN    6/26/2024 1448 New Bag 3.375 g Intravenous Pina Red RN          sodium chloride 0.9% infusion       Date Action Dose Route User    6/27/2024 0641 New Bag (none) Intravenous Josselyn Delacruz RN    6/26/2024 2219 New Bag (none) Intravenous Josselyn Delacruz RN            Vitals (last day)       Date/Time Temp Pulse Resp BP SpO2 Weight O2 Device O2 Flow Rate (L/min) [facility]    06/27/24 1222 -- 59 20 114/65 100 % -- -- --     06/27/24 1036 98.3 °F (36.8 °C) 71 19 111/64 100 % -- None (Room air) --     06/27/24 0733 97.7 °F (36.5 °C) 69 20 113/70 100 % -- None (Room air) --     06/27/24 0442 98.7 °F (37.1 °C) 63 27 110/59 98 % -- None (Room air) 0 L/min     06/27/24 0136 97.7 °F (36.5 °C) 59 20 101/62 97 % -- None (Room air) 0 L/min     06/26/24 2023 99 °F (37.2 °C) 56 21 102/71 99 % -- None (Room air) 0 L/min     06/26/24 1700 -- 57 18 -- -- -- -- --     06/26/24 1544 97.3 °F (36.3 °C) 58 16 120/66 98 % -- None (Room air) -- CS    06/26/24 1245 97.6 °F (36.4 °C) 52 15 119/66 99 % -- None (Room air) --     06/26/24 1226 99 °F (37.2 °C) -- -- -- -- -- -- --     06/26/24 1219 -- 69 12 94/65 100 % -- -- --     06/26/24 1204 -- 50 13 108/72 100 % -- -- --     06/26/24 1149 -- 51 14 115/66 100 % -- -- --     06/26/24 1134 -- 50 13 120/72 100 % -- -- --     06/26/24 1119 -- 54 12 112/66 100 % -- -- --     06/26/24 1114 -- 60 13 103/65 100 % -- -- --     06/26/24 1109 -- 64 12 97/60 100 % -- -- --     06/26/24 1104 98.1 °F (36.7 °C) 67 13 105/67 98 % -- None (Room air) --     06/26/24 0500 98.6 °F (37  °C) 46 18 113/59 98 % -- None (Room air) -- AT         6/25 H&P  Chief Complaint: Jaundice and abdominal pain        Subjective:  History of Present Illness:      Stephy De Oliveira is a 44 year old female with no significant past medical history comes to the ER with complaints of abdominal pain and jaundice.  She states that it has been ongoing for the last 3 days.  Her pain is located in her mid abdomen radiating to her right upper quadrant.  She states that she has associated nausea but no vomiting.  She denies any fever, chills, chest pain or shortness of breath.           Assessment & Plan:  #Obstructive jaundice  #Elevated LFTs  #Dilated CBD  #Gallstones and possible Acute khalif  -NPO  -tend LFTs  -Pain control  -IVF  -GI and Sx consulted  -Abx     6/25 GI CONSULT NOTE  Reason for consultation: Elevated LFTs, abd pain   HPI: This is a 44 yr-old female with hx of ovarian cysts s/p surgical (10+ yrs ago) who presented to ER yesterday with a 3 day hx of abd pain. Hx has been obtained with hospital provided language translation.  Patient presented to the ER yesterday with a 3-day history of epigastric abdominal pain which radiated to the right upper quadrant and back.  Her pain worsened with p.o. intake and she reports mild nausea and a decreased appetite.  No fevers, chills, diarrhea, melena, or hematochezia.  She reports similar less severe attacks occurring sporadically over the last several years.  She reports stable weight over the last several months and no changes in appetite until acute onset of symptoms.  No herbal supplements.  Only chronic medication is acid suppressant.  No NSAIDs, anticoagulants, or antiplatelet agents.  No history of undergoing an endoscopic evaluation.  No recent travel, recent antibiotics, known sick contacts, or dietary indiscretions.  CT a/p IV suggests normal liver appearance, cholelithiasis + sludge with dilation of CBD (9 mm) and moderate intrahepatic biliary tree dilation. No  pancreatic changes noted and no signs of acute cholecystitis; labs with elevated LFTs (  Alk Phos 134 Bili 6.4), normal lipase (33), normal kidney functions, normal WBC (9.1), with low plt (113).              Recent Labs   Lab 06/25/24  0020 06/25/24  0720   * 565*   * 224*     Impression: 44 yr-old female with hx of ovarian cysts s/p surgical (10+ yrs ago) admitted yesterday with a 3 day hx of abd pain which progressed to nausea and poor PO intake. She reports similar less severe episodes for several yrs. No wt loss. No herbal supplements, recent travel, or known sick contacts. Imaging suggests normal liver appearance, cholelithiasis + sludge with dilation of CBD (9 mm) and moderate intrahepatic biliary tree dilation. No pancreatitis per imaging or labs. Discussed with interventional GI (Dr Gallo) and will plan for EUS with possible ERCP  The risks, benefits, alternatives of the procedure including the risks of anesthesia, bleeding, perforation, missed lesions, need for surgery, infection, and acute pancreatitis were discussed with the patient using language line translation. She expressed understanding of the risks and was agreeable to proceed.     Recommendations:      EUS with possible ERCP under MAC with Dr Gallo  NPO with sips of water for necessary medications   Continue IV abx  Pain management per Hospitalist recommendations; antiemetics as needed   Timing of cholecystectomy per general surgery recommendations   CBC, CMP in AM correcting electrolytes per Hospitalist recommendations      6/25 GEN SURGERY CONSULT NOTE       Impression:      Patient Active Problem List   Diagnosis    Elevated liver enzymes    Elevated bilirubin    Common bile duct (CBD) obstruction (HCC)    Calculus of gallbladder with acute cholecystitis and obstruction         44-year-old female who was admitted through the emergency department with complaints of abdominal pain.    Interview was conducted  with the assistance of  ID number [id number] as the patient is primarily Afghan speaking.  Workup in the emergency department revealed elevated LFTs with total bilirubin of 8.0.  CBC within normal limits except platelet count mildly diminished at 113  CT scan abdomen pelvis reveals cholelithiasis, biliary sludge extending into the common bile duct with biliary dilatation.  CBD measures 9 mm.  Cholelithiasis is also noted in the cystic duct.  Patient is also been seen by GI service.  She is scheduled for ERCP and stone extraction today.  Subsequently we did discuss proceeding with laparoscopic cholecystectomy with intraoperative cholangiogram for symptomatic cholelithiasis  The patient is comfortable with these treatment recommendations.  She has no further questions at this time and will proceed as discussed  The risks, benefits, complications, possible outcomes and alternatives of the procedure were explained to the patient in detail.  Potential complications of this procedure and as with any surgical procedure and anesthesia were reviewed including but not limited to bleeding, infection, thromboembolic event, pneumonia were discussed.  Expected postoperative pain, recuperation and postoperative course and possible complications were also reviewed.  All questions from the patient were answered in detail.  The patient did verbalize understanding and does not have any further questions at this time.  The patient does wish to proceed with the proposed procedure.    6/25 GI OP NOTE    PREOPERATIVE DIAGNOSIS/INDICATION: Abnormal LFT's and CT  POSTOPERTATIVE DIAGNOSIS: Choledocholithiasis  PROCEDURE PERFORMED: EUS/EGD  FINDINGS:  DUODENUM: Normal  MAJOR AMPULLA: Normal  ECHO: Imaging was performed through the stomach and duodenum. The common bile duct was dilated with multiple echogenic filling defects with shadowing  RECOMMENDATIONS:   Proceed with ERCP  PREOPERATIVE DIAGNOSIS/INDICATION: Abnormal  LFT's, choledocholithiasis  POSTOPERTATIVE DIAGNOSIS: Same  PROCEDURE PERFORMED: ERCP with biliary sphincterotomy, balloon stone extraction and PD stent placement  FINDINGS:  DUODENUM: Normal  MAJOR AMPULLA: Normal  ERP: Guidewire cannulation only  ERC: The CBD and the CHD were dilated with multiple filling defects cw choledocholithiasis, the confluence of the right and left hepatic ducts and the intrahepatics appear wnl, the cystic duct and the gallbladder were not visualized  6/26 GEN SURG OP NOTE  Date of procedure:   June 26, 2024 cholelithiasis     Pre-Operative Diagnosis: Acute cholecystitis, cholelithiasis, resolved choledocholithiasis status post ERCP and stone extraction     Indication for Surgery: Symptomatic cholelithiasis     Post-Operative Diagnosis/Operative Findings: Same as above-negative intraoperative cholangiogram     Procedure performed:  Laparoscopic cholecystectomy with intraoperative cholangiogram and ICG    6/26 GI NOTE  Chief Complaint: Choledocholithiasis   S: Sleepy now--s/p laparoscopic cholecystectomy this AM. No abd pain and no overt GI bleeding. No fevers  O: /66 (BP Location: Left arm)   Pulse 52   Temp 97.6 °F (36.4 °C) (Temporal)   Resp 15   Wt 135 lb (61.2 kg)   SpO2 99%   Gen: Drowsy, easily arouses    CV: Bradycardic, regular   Resp: CTA bilaterally; No increase in respiratory effort   Abd: (+)BS, soft, non-tender, mildly distended; no rebound or guarding; incisions without drainage  Ext: No edema or cyanosis  Skin: Warm and dry  Laboratory Data:      No results for input(s): \"PGLU\" in the last 168 hours.  No results for input(s): \"INR\" in the last 168 hours.             Recent Labs   Lab 06/25/24  0020 06/26/24  0635   WBC 9.1 7.3   HGB 15.1 13.1   .0* 99.0*               Recent Labs   Lab 06/25/24  0020 06/25/24  0720 06/26/24  0635    137 138   K 4.4 3.6 3.8    108 110   CO2 28.0 24.0 21.0   BUN 7* 6* 6*   CREATSERUM 0.76 0.59 0.43*                Recent Labs   Lab 06/25/24  0020 06/25/24  0720 06/26/24  0635   * 565* 455*   * 224* 148*      Assessment:  44 yr-old female with hx of ovarian cysts s/p surgical (10+ yrs ago) admitted 6/24 with a 3 day hx of abd pain which progressed to nausea and poor PO intake found to have choledocholithiasis + acute on chronic cholecystitis s/p ERCP (6/25) and today underwent lap cholecystectomy.   No signs of overt GI bleeding and no signs of acute pancreatitis. LFTs improving. Will need xray in 1 week to assess for PD stent displacement   Plan:   OK to advance diet per general surgery recommendations   Pain management per Hospitalist recommendations/antiemetics as needed   KUB ordered to be done in 1 week to assess for PD stent---if present then will need EGD for removal  6/27 HOSPITALIST NOTE    Subjective:  Patient denies abdominal pain. No nausea or vomiting.      Vital signs:  Temp:  [97.3 °F (36.3 °C)-99 °F (37.2 °C)] 98.3 °F (36.8 °C)  Pulse:  [50-71] 71  Resp:  [12-27] 19  BP: ()/(59-72) 111/64  SpO2:  [97 %-100 %] 100 %     Recent Labs   Lab 06/25/24  0020 06/26/24  0635 06/27/24  0611   WBC 9.1 7.3 15.5*   HGB 15.1 13.1 12.5   MCV 83.2 85.9 87.1   .0* 99.0* 85.0*               Recent Labs   Lab 06/25/24  0720 06/26/24  0635 06/27/24  0611   GLU 73 92 93   BUN 6* 6* 5*   CREATSERUM 0.59 0.43* 0.50*   CA 8.2* 7.9* 7.8*   ALB 2.8* 2.3* 2.4*    138 137   K 3.6 3.8 3.5    110 106   CO2 24.0 21.0 25.0   ALKPHO 134* 112* 100*   * 148* 156*   * 455* 454*   BILT 6.4* 2.6* 3.1*   TP 6.7 5.9* 5.7*       Medications:   Scheduled Medications    enoxaparin  40 mg Subcutaneous Daily    pantoprazole  20 mg Oral QAM AC    piperacillin-tazobactam  3.375 g Intravenous Q8H                  Assessment & Plan:  Abdominal pain with obstructive jaundice d/t choledocholithiasis sp EUS/ERCP with biliary sphincterotomy, balloon stone extraction and PD stent placement 6/26 sp lap khalif  with IOC 6/26  NPO  IVF  IV abx  Pain control  HIDA today at 11  KUB 1 week  GI & Surgery consult  Thrombocytopenia, sepsis/meds/consumptive  Monitor       6/27 GEN SURGERY NOTE    Subjective:  The patient is seen with assistance of language line .  She reports improved abdominal pain today.  She denies nausea or vomiting.  She is tolerating clear liquids.  She denies fever or chills.     Objective/Physical Exam:  General: Alert, orientated x3.  Cooperative.  No apparent distress.  Vital Signs:  Blood pressure 113/70, pulse 69, temperature 97.7 °F (36.5 °C), temperature source Oral, resp. rate 20, weight 135 lb (61.2 kg), SpO2 100%.      Wt Readings from Last 3 Encounters:   06/25/24 135 lb (61.2 kg)       Assessment      Patient Active Problem List   Diagnosis    Elevated liver enzymes    Elevated bilirubin    Common bile duct (CBD) obstruction (HCC)    Calculus of gallbladder with acute cholecystitis and obstruction         POD 1 lap khalif with bile noted in drain     Plan:  Stat HIDA scan  NPO   Continue IV antibiotics with Zosyn  Ambulate and up to chair  DVT prophylaxis with Lovenox  Further treatment recommendations to be made once HIDA scan has been completed.

## 2024-06-27 NOTE — PROGRESS NOTES
Gastroenterology Progress Note  Stephy De Oliveira Patient Status:  Inpatient    1980 MRN [id number]   [facility] 4NW-A Attending Moshe López MD   Hosp Day # 2 PCP None Pcp     Chief Complaint: Choledocholithiasis, s/p lap choley, bile leak  S: Pt is POD 1 from lap choley. Pt was seen by surgery service with notes of bilious output in STACIE drain. Stat HIDA scan with radiotracer extravasation accumulating in the gallbladder fossa, c/w bile leak with extensive subcutaneous emphysema overlying the chest wall and abdominal wall extending into the right upper extremity. Pt denies increase in abdominal pain. She has bilious drainage noted at drain insertion site, on gauze, with sanguinous drainage noted within the drain. Denies N/V, though has had some regurgitation. Denies hematochezia or melena.   O: /65   Pulse 59   Temp 98.3 °F (36.8 °C) (Oral)   Resp 20   Ht 5' 2\" (1.575 m)   Wt 135 lb (61.2 kg)   SpO2 100%   BMI 24.69 kg/m²   Gen: AAOx3  CV: RRR with normal S1 / S2  Resp: CTA bilaterally; No increase in respiratory effort   Abd: (+)BS, soft, incisional tenderness, non-distended; no rebound or guarding. RUQ drain with bilious drainage noted on gauze, with sanguinous drainage in drain.   Ext: No edema or cyanosis  Skin: Warm and dry  Laboratory Data:     No results for input(s): \"PGLU\" in the last 168 hours.  No results for input(s): \"INR\" in the last 168 hours.      Recent Labs   Lab 24  0020 24  0635 24  0611   WBC 9.1 7.3 15.5*   HGB 15.1 13.1 12.5   .0* 99.0* 85.0*       Recent Labs   Lab 24  0020 24  0720 24  0635 24  0611    137 138 137   K 4.4 3.6 3.8 3.5    108 110 106   CO2 28.0 24.0 21.0 25.0   BUN 7* 6* 6* 5*   CREATSERUM 0.76 0.59 0.43* 0.50*       Recent Labs   Lab 24  0020 24  0720 24  0635 24  0611   * 565* 455* 454*   * 224*  148* 156*     Imaging:  PROCEDURE:  NM GB HEPATOBILIARY SPECT/CT (SINGLE) 1 DAY (CPT=78226/59061)     COMPARISON:  [facility] , CT, CT ABDOMEN+PELVIS(CONTRAST ONLY)(CPT=74177), 6/25/2024, 1:03 AM.     INDICATIONS:  s/p lap khalif with bile in drain, possible biliary leak     TECHNIQUE:  Tc99m labeled mebrofenin was injected IV, and dynamic images of the abdomen were obtained in the anterior projection for one hour. Additional dedicated SPECT images were taken with concurrent CT scan for both anatomical localization as well  as attenuation correction. Scan was reformatted into multi-planar reconstructions on a dedicated workstation.         RADIOPHARMACEUTICAL(S):  7.3 mCi Tc-99m mebrofenin.     FINDINGS:  There is radiotracer accumulation in the gallbladder fossa and an additional focus of radio tracer along the course of a surgically placed drain in the right upper quadrant, frame 53 of 108 on CT fusion axial images.  Findings consistent with  biliary leak.  There is no evidence of radiotracer in small bowel on images obtained.     CT images demonstrate small bilateral pleural effusions with atelectasis in the lung bases.     There is a large amount of subcutaneous emphysema involving the chest and abdominal wall extending into the right upper extremity.     There is a common bile duct stent.     There is ascites.  There is free intra-abdominal air consistent with recent surgery.     There is residual contrast in colon casting beam hardening artifact.                      Impression   CONCLUSION:  There is radiotracer extravasation accumulating in the gallbladder fossa and focus in the right upper quadrant compatible with bile leak.     Extensive subcutaneous emphysema overlying the chest wall and abdominal wall extending into the right upper extremity.     Free air and small amount of free fluid consistent with patient's recent cholecystectomy.     Common bile duct stent.     Findings phoned to nurse Ileana Asencio on  6/27/2024 at the time of this dictation.        LOCATION:  [facility]           Dictated by (CST): Nanda Marie MD on 6/27/2024 at 12:07 PM      Finalized by (CST): Nanda Marie MD on 6/27/2024 at 12:21 PM     Assessment:   Pt s/p ERCP for choledocholithiasis on 6/25, now POD 1 from lap choley, with STACIE drain with bilious output today. HIDA scan positive for bile leak. Pt will require repeat ERCP with stent placement for control of bile leak.   Plan:   ERCP with stent placement today with Dr. Desai. Pt verbalized understanding and is in agreement with plan.   NPO.   Pain/nausea management per primary service.     Mildred Plummer, APRN  2:46 PM  6/27/2024  [facility]  [phone]

## 2024-06-27 NOTE — OPERATIVE REPORT
Thi Laurenu Angela De Oliveira Patient Status:  Inpatient    1980 MRN [id number]   [facility] Attending Moshe López MD   Hosp Day # 2 PCP None Pcp     PREOPERATIVE DIAGNOSIS/INDICATION: Bile leak s/p cholecystectomy  POSTOPERTATIVE DIAGNOSIS: See Impression  PROCEDURE PERFORMED: ERCP   DATE: 24  TIME OUT WAS PERFORMED    SEDATION: MAC sedation provided by General Anesthesia    INFORMED CONSENT: I have discussed the risks, benefits, and alternatives to endoscopic retrograde cholangiopancreatography (ERCP) with possible intervention [i.e. sphincterotomy, stent placement etc.] with the patient including but not limited to the risks of bleeding, cholangitis, cholecystitis, pancreatitis, pain, as well as the risks of anesthesia and perforation all possibly leading to prolonged hospitalization and surgical intervention. All questions were answered to the patient’s satisfaction. The patient elected to proceed with ERCP with intervention as indicated.  PROCEDURE DESCRIPTION: The duodenoscope was introduced into the patient’s mouth, hypo pharynx, esophagus, stomach and the first and second portion of the duodenum, straightening of the endoscope was performed to obtain a direct view of the major ampulla. Careful but limited examination of the above described areas was performed on withdrawal of the endoscope. The patient tolerated the procedure well and there were no immediate complications noted during the procedure, the patient was transported to the recovery area in stable condition.  FINDINGS:  A  film was taken. The duodenoscope was advanced to the ampulla with a previously performed biliary sphincterotomy. A plastic pancreatic duct stent was protruding through the pancreatic duct. The common bile duct was cannulated using a 9-12 mm balloon catheter. An 0.035 jagwire was advanced into what appeared to be the intrahepatics. The balloon was inflated to 12 mm and an  occlusion cholangiogram was performed. The distal 1 cm of the common bile duct opacified and was normal in caliber. No contrast opacified the more proximal common bile duct or intrahepatics. There was contrast visualized extravasating into the gallbladder fossa. The balloon catheter could not be advanced beyond the distal common bile duct. No biliary stent could be placed.  The duodenoscope was withdrawn to the stomach and all contents were suctioned. The duodenoscope was withdrawn from the patient. Upon removal of the side-viewing endoscope the distal plastic attachment cap was seen to not be present. An upper endoscope was advanced into the stomach. No distal cap was visualized in the stomach or duodenum.     IMPRESSION:   1. Unable to place biliary stent due to mid-common bile duct obstruction. This may be due to a CBD clip proximal to the cystic duct. A bile leak is present eminating from the cystic duct.   RECOMMENDATIONS:    1. Watch for complications (i.e. bleeding, infection, perforation, cardiopulmonary complications and pancreatitis.)   2. CMP in am.    3. Ok to resume clear liquid diet.    4. Discussed with general surgery - agree with HPB consult and MRCP.     JILLIAN Desai  Gastroenterology/Advanced Endoscopy  [facility]

## 2024-06-27 NOTE — PROGRESS NOTES
[facility]  Progress Note    Stephy De Oliveira Patient Status:  Inpatient    1980 MRN [id number]   Location [facility] 4NW-A Attending Moshe López MD   Hosp Day # 2 PCP None Pcp     Subjective:  The patient is seen with assistance of language line .  She reports improved abdominal pain today.  She denies nausea or vomiting.  She is tolerating clear liquids.  She denies fever or chills.    Objective/Physical Exam:  General: Alert, orientated x3.  Cooperative.  No apparent distress.  Vital Signs:  Blood pressure 113/70, pulse 69, temperature 97.7 °F (36.5 °C), temperature source Oral, resp. rate 20, weight 135 lb (61.2 kg), SpO2 100%.  Wt Readings from Last 3 Encounters:   24 135 lb (61.2 kg)     Lungs: No respiratory distress.  Cardiac: Regular rate and rhythm.   Abdomen:  Soft, mildly distended, incisional tenderness, with no rebound or guarding.  No peritoneal signs.   Extremities:  No lower extremity edema noted.    Incision: Clean, dry, intact, no erythema  Drain: With bilious output noted    Intake/Output:    Intake/Output Summary (Last 24 hours) at 2024 0800  Last data filed at 2024 0641  Gross per 24 hour   Intake 3532 ml   Output 190 ml   Net 3342 ml     I/O last 3 completed shifts:  In: 6067 [I.V.:5967; IV PIGGYBACK:100]  Out: 190 [Drains:150; Blood:40]  No intake/output data recorded.    Medications:    pantoprazole  20 mg Oral QAM AC    piperacillin-tazobactam  3.375 g Intravenous Q8H       Labs:  Lab Results   Component Value Date    WBC 15.5 2024    HGB 12.5 2024    HCT 36.4 2024    PLT 85.0 2024     Lab Results   Component Value Date     2024    K 3.5 2024     2024    CO2 25.0 2024    BUN 5 2024    CREATSERUM 0.50 2024    GLU 93 2024    CA 7.8 2024    ALKPHO 100 2024     2024     2024    BILT 3.1 2024    ALB 2.4 2024    TP 5.7  06/27/2024     No results found for: \"PT\", \"INR\"      Assessment  Patient Active Problem List   Diagnosis    Elevated liver enzymes    Elevated bilirubin    Common bile duct (CBD) obstruction (HCC)    Calculus of gallbladder with acute cholecystitis and obstruction       POD 1 lap khalif with bile noted in drain    Plan:  Stat HIDA scan  NPO   Continue IV antibiotics with Zosyn  Ambulate and up to chair  DVT prophylaxis with Lovenox  Further treatment recommendations to be made once HIDA scan has been completed.      Quality:  DVT Mechanical Prophylaxis:   SCDs,    DVT Pharmacologic Prophylaxis   Medication   None                Code Status: Not on file  Villafana: No urinary catheter in place  Villafana Duration (in days):   Central line:    NURA:         Vidhi Hewitt PA-C  6/27/2024  8:00 AM    Patient is seen today with her 2 sons at the bedside.  Patient was interviewed with the assistance of [facility]  ID number [id number].    Intraoperative findings were discussed in detail with the patient.  This morning the patient was noted to have bilious output from the Nicko drain.  Stat HIDA scan was ordered and this did reveal a bile leak.  Imaging was reviewed with Dr. Gibbs.  The bile leak appears to emanate from the cystic duct remnant although the anatomy is not quite clear.  The appearance of the bile leak on HIDA scan was also discussed with the patient.  We did discuss that this finding of a bile leak will require endoscopy with ERCP and CBD stent placement.  The patient is comfortable with this treatment plan.  She does not have any further questions at this time and she will proceed with endoscopic management as discussed.  The patient denies any nausea and vomiting.  The patient reports mild incisional tenderness.  GI has been notified and the case was discussed with Dr. Boyce who will speak with Dr. Desai to proceed with ERCP and CBD stent placement today    I agree with the note above and attest to  its accuracy with the following changes below after my interview and examination of the patient    The patient was seen and examined.  Available labs and radiology is noted.    Cheyanne Pereyra MD FACS    Please note that this report has been produced using speech recognition software and may contain errors related to that system including but not limited to errors in grammar, punctuation and spelling as well as words and phrases that possibly may have been recognized inappropriately.  If there are any questions or concerns please contact the dictating provider for clarification.    The 21st Century Cures Act makes medical notes like these available to patients in the interest of trans parency. Please be advised this is a medical document. Medical documents are intended to carry relevant information, facts as evident, and the clinical opinion of the practitioner. The medical note is intended as peer to peer communication and may appear blunt or direct. It is written in medical language and may contain abbreviations or verbiage that are unfamiliar.   Again if there are any questions or concerns please contact the dictating provider for clarification.

## 2024-06-27 NOTE — ANESTHESIA PREPROCEDURE EVALUATION
PRE-OP EVALUATION    Patient Name: Stephy De Oliveira    Admit Diagnosis: Elevated liver enzymes [R74.8]  Elevated bilirubin [R17]    Pre-op Diagnosis: bile leak    ENDOSCOPIC RETROGRADE CHOLANGIOPANCREATOGRAPHY (ERCP)    Anesthesia Procedure: ENDOSCOPIC RETROGRADE CHOLANGIOPANCREATOGRAPHY (ERCP)    Surgeons and Role:     * Ameya Desai, DO - Primary    Pre-op vitals reviewed.  Temp: 98.1 °F (36.7 °C)  Pulse: 69  Resp: 19  BP: 112/61  SpO2: 99 %  Body mass index is 24.69 kg/m².    Current medications reviewed.  Hospital Medications:  • HYDROmorphone (Dilaudid) 1 MG/ML injection 0.1 mg  0.1 mg Intravenous Q2H PRN    Or   • HYDROmorphone (Dilaudid) 1 MG/ML injection 0.2 mg  0.2 mg Intravenous Q2H PRN    Or   • HYDROmorphone (Dilaudid) 1 MG/ML injection 0.4 mg  0.4 mg Intravenous Q2H PRN   • [Held by provider] enoxaparin (Lovenox) 40 MG/0.4ML SUBQ injection 40 mg  40 mg Subcutaneous Daily   • [COMPLETED] magnesium sulfate in sterile water for injection 2 g/50mL IVPB premix 2 g  2 g Intravenous Once   • oxyCODONE immediate release tab 5 mg  5 mg Oral Q4H PRN   • [COMPLETED] sodium chloride 0.9 % IV bolus 1,000 mL  1,000 mL Intravenous Once   • [COMPLETED] iopamidol 76% (ISOVUE-370) injection for power injector  80 mL Intravenous ONCE PRN   • pantoprazole (Protonix) DR tab 20 mg  20 mg Oral QAM AC   • sodium chloride 0.9% infusion   Intravenous Continuous   • ondansetron (Zofran) 4 MG/2ML injection 4 mg  4 mg Intravenous Q6H PRN   • prochlorperazine (Compazine) 10 MG/2ML injection 5 mg  5 mg Intravenous Q8H PRN   • piperacillin-tazobactam (Zosyn) 3.375 g in dextrose 5% 100 mL IVPB-ADDV  3.375 g Intravenous Q8H   • [COMPLETED] indocyanine green (IC-Green) injection 5 mg  5 mg Intravenous Once   • [] lactated ringers IV bolus 500 mL  500 mL Intravenous Once PRN   • [] atropine 0.1 MG/ML injection 0.5 mg  0.5 mg Intravenous PRN   • [] naloxone (Narcan) 0.4 MG/ML injection 0.08 mg  0.08 mg Intravenous  PRN   • [] fentaNYL (Sublimaze) 50 mcg/mL injection 25 mcg  25 mcg Intravenous Q5 Min PRN    Or   • [] fentaNYL (Sublimaze) 50 mcg/mL injection 50 mcg  50 mcg Intravenous Q5 Min PRN   • [] HYDROmorphone (Dilaudid) 1 MG/ML injection 0.2 mg  0.2 mg Intravenous Q5 Min PRN    Or   • [] HYDROmorphone (Dilaudid) 1 MG/ML injection 0.4 mg  0.4 mg Intravenous Q5 Min PRN    Or   • [] HYDROmorphone (Dilaudid) 1 MG/ML injection 0.6 mg  0.6 mg Intravenous Q5 Min PRN   • [] indomethacin (Indocin) 100 MG rectal suppository       • [COMPLETED] ondansetron (Zofran) 4 MG/2ML injection 4 mg  4 mg Intravenous Once       Outpatient Medications:     Medications Prior to Admission   Medication Sig Dispense Refill Last Dose   • pantoprazole 20 MG Oral Tab EC Take 1 tablet (20 mg total) by mouth every morning before breakfast.   2024       Allergies: Advil [ibuprofen]      Anesthesia Evaluation    Patient summary reviewed.    Anesthetic Complications           GI/Hepatic/Renal                                 Cardiovascular                                                       Endo/Other                                  Pulmonary                           Neuro/Psych                                    History reviewed. No pertinent surgical history.  Social History     Socioeconomic History   • Marital status:    Tobacco Use   • Smoking status: Never   • Smokeless tobacco: Never   Substance and Sexual Activity   • Alcohol use: Yes     Comment: socially   • Drug use: Never     History   Drug Use Unknown     Available pre-op labs reviewed.  Lab Results   Component Value Date    WBC 15.5 (H) 2024    RBC 4.18 2024    HGB 12.5 2024    HCT 36.4 2024    MCV 87.1 2024    MCH 29.9 2024    MCHC 34.3 2024    RDW 13.2 2024    PLT 85.0 (L) 2024     Lab Results   Component Value Date     2024    K 3.5 2024     2024     CO2 25.0 06/27/2024    BUN 5 (L) 06/27/2024    CREATSERUM 0.50 (L) 06/27/2024    GLU 93 06/27/2024    CA 7.8 (L) 06/27/2024            Airway      Mallampati: I  Mouth opening: >3 FB  TM distance: > 6 cm  Neck ROM: full Cardiovascular    Cardiovascular exam normal.  Rhythm: regular  Rate: normal     Dental             Pulmonary    Pulmonary exam normal.                 Other findings        ASA: 2   Plan: MAC  NPO status verified and patient meets guidelines.          Plan/risks discussed with: patient and significant other            Present on Admission:  **None**

## 2024-06-28 ENCOUNTER — APPOINTMENT (OUTPATIENT)
Dept: CT IMAGING | Facility: HOSPITAL | Age: 44
DRG: 409 | End: 2024-06-28
Attending: Physician Assistant
Payer: COMMERCIAL

## 2024-06-28 ENCOUNTER — APPOINTMENT (OUTPATIENT)
Dept: GENERAL RADIOLOGY | Facility: HOSPITAL | Age: 44
DRG: 409 | End: 2024-06-28
Attending: Physician Assistant
Payer: COMMERCIAL

## 2024-06-28 PROBLEM — D69.6 THROMBOCYTOPENIA (HCC): Status: ACTIVE | Noted: 2024-06-28

## 2024-06-28 LAB
ALBUMIN SERPL-MCNC: 2.3 G/DL (ref 3.4–5)
ALBUMIN/GLOB SERPL: 0.6 {RATIO} (ref 1–2)
ALP LIVER SERPL-CCNC: 101 U/L
ALT SERPL-CCNC: 431 U/L
ANION GAP SERPL CALC-SCNC: 7 MMOL/L (ref 0–18)
APTT PPP: 28.6 SECONDS (ref 23–36)
AST SERPL-CCNC: 146 U/L (ref 15–37)
BASOPHILS # BLD AUTO: 0.02 X10(3) UL (ref 0–0.2)
BASOPHILS NFR BLD AUTO: 0.2 %
BILIRUB SERPL-MCNC: 3.1 MG/DL (ref 0.1–2)
BUN BLD-MCNC: 7 MG/DL (ref 9–23)
CALCIUM BLD-MCNC: 7.9 MG/DL (ref 8.5–10.1)
CHLORIDE SERPL-SCNC: 106 MMOL/L (ref 98–112)
CO2 SERPL-SCNC: 24 MMOL/L (ref 21–32)
CREAT BLD-MCNC: 0.37 MG/DL
EGFRCR SERPLBLD CKD-EPI 2021: 129 ML/MIN/1.73M2 (ref 60–?)
EOSINOPHIL # BLD AUTO: 0.13 X10(3) UL (ref 0–0.7)
EOSINOPHIL NFR BLD AUTO: 1.4 %
ERYTHROCYTE [DISTWIDTH] IN BLOOD BY AUTOMATED COUNT: 13.2 %
FIBRINOGEN PPP-MCNC: 444 MG/DL (ref 200–480)
FOLATE SERPL-MCNC: 17.8 NG/ML (ref 8.7–?)
GLOBULIN PLAS-MCNC: 3.7 G/DL (ref 2.8–4.4)
GLUCOSE BLD-MCNC: 82 MG/DL (ref 70–99)
HCT VFR BLD AUTO: 35.4 %
HGB BLD-MCNC: 12.2 G/DL
IMM GRANULOCYTES # BLD AUTO: 0.08 X10(3) UL (ref 0–1)
IMM GRANULOCYTES NFR BLD: 0.9 %
INR BLD: 1.05 (ref 0.8–1.2)
LYMPHOCYTES # BLD AUTO: 1.04 X10(3) UL (ref 1–4)
LYMPHOCYTES NFR BLD AUTO: 11.1 %
MAGNESIUM SERPL-MCNC: 2.1 MG/DL (ref 1.6–2.6)
MCH RBC QN AUTO: 30 PG (ref 26–34)
MCHC RBC AUTO-ENTMCNC: 34.5 G/DL (ref 31–37)
MCV RBC AUTO: 87 FL
MONOCYTES # BLD AUTO: 0.84 X10(3) UL (ref 0.1–1)
MONOCYTES NFR BLD AUTO: 8.9 %
NEUTROPHILS # BLD AUTO: 7.3 X10 (3) UL (ref 1.5–7.7)
NEUTROPHILS # BLD AUTO: 7.3 X10(3) UL (ref 1.5–7.7)
NEUTROPHILS NFR BLD AUTO: 77.5 %
OSMOLALITY SERPL CALC.SUM OF ELEC: 281 MOSM/KG (ref 275–295)
PLATELET # BLD AUTO: 67 10(3)UL (ref 150–450)
POTASSIUM SERPL-SCNC: 3.6 MMOL/L (ref 3.5–5.1)
PROT SERPL-MCNC: 6 G/DL (ref 6.4–8.2)
PROTHROMBIN TIME: 13.7 SECONDS (ref 11.6–14.8)
RBC # BLD AUTO: 4.07 X10(6)UL
SODIUM SERPL-SCNC: 137 MMOL/L (ref 136–145)
VIT B12 SERPL-MCNC: >2000 PG/ML (ref 193–986)
WBC # BLD AUTO: 9.4 X10(3) UL (ref 4–11)

## 2024-06-28 PROCEDURE — 3078F DIAST BP <80 MM HG: CPT | Performed by: HOSPITALIST

## 2024-06-28 PROCEDURE — 74150 CT ABDOMEN W/O CONTRAST: CPT | Performed by: PHYSICIAN ASSISTANT

## 2024-06-28 PROCEDURE — 99254 IP/OBS CNSLTJ NEW/EST MOD 60: CPT | Performed by: NURSE PRACTITIONER

## 2024-06-28 PROCEDURE — 74018 RADEX ABDOMEN 1 VIEW: CPT | Performed by: PHYSICIAN ASSISTANT

## 2024-06-28 PROCEDURE — 3008F BODY MASS INDEX DOCD: CPT | Performed by: HOSPITALIST

## 2024-06-28 PROCEDURE — 3074F SYST BP LT 130 MM HG: CPT | Performed by: HOSPITALIST

## 2024-06-28 PROCEDURE — 99231 SBSQ HOSP IP/OBS SF/LOW 25: CPT | Performed by: HOSPITALIST

## 2024-06-28 RX ORDER — GADOTERATE MEGLUMINE 376.9 MG/ML
15 INJECTION INTRAVENOUS
Status: COMPLETED | OUTPATIENT
Start: 2024-06-28 | End: 2024-06-28

## 2024-06-28 NOTE — PROGRESS NOTES
Went for MRCP. Returned in no distress. Huber continuing to drain bile , but in lesser amts. Declines pain meds. Iv fluids and iv antibiotics given. Spouse at bedside

## 2024-06-28 NOTE — PROGRESS NOTES
[facility]  Progress Note    Stephy De Oliveira Patient Status:  Inpatient    1980 MRN [id number]   [facility] 4NW-A Attending Moshe López MD   Hosp Day # 3 PCP None Pcp     Subjective:  Interview is being conducted with  ID number [id number] as the patient and her  are primarily Thai speaking.  The patient's  is at the bedside.  Thi denies any abdominal pain.  She denies any nausea or vomiting.    Physical Exam:    Vital Signs:    Blood pressure 100/65, pulse 68, temperature 97.4 °F (36.3 °C), temperature source Temporal, resp. rate 13, height 62\", weight 135 lb (61.2 kg), SpO2 98%.    Intake/Output Summary (Last 24 hours) at 2024 0930  Last data filed at 2024 0639  Gross per 24 hour   Intake 1600 ml   Output 265 ml   Net 1335 ml     No intake/output data recorded.  Lungs: Clear to auscultation bilaterally.  Cardiac: Regular rate and rhythm. No murmur.  Abdomen:  Soft, nondistended, no percussion tenderness or guarding, no peritoneal signs.  Surgical incisions are clean and dry.  Nicko drain in right upper quadrant with bilious output    Labs:  Recent Labs   Lab 24  0635 24  0611 24  0644   RBC 4.41 4.18 4.07   HGB 13.1 12.5 12.2   HCT 37.9 36.4 35.4   MCV 85.9 87.1 87.0   MCH 29.7 29.9 30.0   MCHC 34.6 34.3 34.5   RDW 13.2 13.2 13.2   NEPRELIM 4.92 12.89* 7.30   WBC 7.3 15.5* 9.4   PLT 99.0* 85.0* 67.0*     Recent Labs   Lab 24  0635 24  0611 24  0644   GLU 92 93 82   BUN 6* 5* 7*   CREATSERUM 0.43* 0.50* 0.37*   CA 7.9* 7.8* 7.9*   ALB 2.3* 2.4* 2.3*    137 137   K 3.8 3.5 3.6    106 106   CO2 21.0 25.0 24.0   ALKPHO 112* 100* 101*   * 156* 146*   * 454* 431*   BILT 2.6* 3.1* 3.1*   TP 5.9* 5.7* 6.0*         Chief Complaint:     Cholelithiasis, acute on chronic cholecystitis, status post ERCP and stone extraction, POD 2 status post laparoscopic cholecystectomy, postoperative bile  leak, status post ERCP and MRCP yesterday      Assessment/Plan:   Patient is afebrile.  CMP this morning reveals essentially stable alkaline phosphatase, AST, ALT have slightly decreased.  Total bilirubin is stable at 3.1.  Leukocytosis has resolved yesterday 15.5 today 9.4.  Hemoglobin stable at 12.2.  Platelet count continues to slowly decrease 85 yesterday, 67 today.  Platelet count has been slowly decreasing since admission from 113.    Patient is POD 2 s/p laparoscopic cholecystectomy with IOC- yesterday, patient was noted to have bile in Nicko drain.  Gi consultation was requested for ERCP.  ERCP was performed by Dr. Desai yesterday.  Bile leak was noted and there was concern for possible CBD injury.  Case was discussed with Dr. Desai as well as Dr. Piedra yesterday  MRCP was then ordered to further define anatomy.  MRCP was limited due to motion.  Numerous clips were noted in the region of the gallbladder fossa with marked artifact.  There was mild intrahepatic ductal dilatation.  The CBD was not seen with certainty.  Pancreatic duct is normal.  No evidence of pancreatitis.  Small amount of fluid in the gallbladder fossa right paracolic gutter and right subhepatic region.    I did speak with Stephy and her  at the bedside using the medical [employer] .  We did discuss the results of yesterday's ERCP and MRCP.  We did discuss the presence of a bile leak and the possibility of a CBD injury which may require further surgery for resolution and repair.  We also discussed the possible need for further testing including PTC.  Stephy and her  report understanding of the bile leak and possibility of CBD injury.  They have no further questions at this time.  Further recommendations will be pending surgical oncology evaluation and possible PTC    Will order hematology consult today for continuing thrombocytopenia.     LITA Pereyra MD, FACS      Please note that this report has been produced using  speech recognition software and may contain errors related to that system including but not limited to errors in grammar, punctuation and spelling as well as words and phrases that possibly may have been recognized inappropriately.  If there are any questions or concerns please contact the dictating provider for clarification.  The 21st Century Cures Act makes medical notes like these available to patients in the interest of transparency. Please be advised this is a medical document. Medical documents are intended to carry relevant information, facts as evident, and the clinical opinion of the practitioner. The medical note is intended as peer to peer communication and may appear blunt or direct. It is written in medical language and may contain abbreviations or verbiage that are unfamiliar.  If there are any questions or concerns please contact the dictating provider for clarification.

## 2024-06-28 NOTE — CONSULTS
Hem/Onc Report of Consultation    Patient Name: Stephy De Oliveira   YOB: 1980   Medical Record Number: [id number]   CSN: [id number]   Consulting Physician: Dr. Marquis Holguin  Referring Provider(s): Dr. Moshe López  Date of Consultation: 6/28/2024     The 21st Century Cures Act makes medical notes like these available to patients in the interest of transparency. Please be advised this is a medical document. Medical documents are intended to carry relevant information, facts as evident, and the clinical opinion of the practitioner. The medical note is intended as peer to peer communication and may appear blunt or direct. It is written in medical language and may contain abbreviations or verbiage that are unfamiliar.     Reason for Consultation: thrombocytopenia    Chief Complaint:   Chief Complaint   Patient presents with    Abdomen/Flank Pain    Jaundice       History of Present Illness    Stephy De Oliveira is a Chinese-speaking 44 year old female with past medical history of ovarian cyst who presented tot he ER with complaint of acid reflux, jaundice, and abdominal discomfort to bilateral flanks on June 24, 2024. She was found to have transaminitis with bilirubin of 8.0 on labs in the ER as well as mild thrombocytopenia and CT A./P revealed cholelithiasis and biliary sludge extending into the common bile duct with moderate upstream biliary dilation and patient was subsequently admitted for further work up. On admission, she platelet count was low at 113k.    Upon admission, she was evaluated by GI and taken for EUS/EGD with ERCP with biliary sphincterotomy, balloon stone extraction, and PD stent placement on June 25. She was also evaluated by general surgery and underwent lap khalif on June 26 by Dr. Pereyra. Post operatively it was noted that patient had bilious output from STACIE drain and stat HIDA scan was performed showing radiotracer extravasation consistent with bile leak with extensive subcutaneous  emphysema overlying the chest wall and abdominal wall extending into the right upper extremity. Subsequently, she was taken for ERCP again with stent placement on June 27. Throughout her admission, it has been noted that her platelet count continues to decline and is at 67K this morning prompting hematology consult.     [place] Islander  Behzad( #[id number]) was utilized for the duration of my interaction with the patient. Her son was present at the bedside. She tells me that she is not aware of ever being told she has a low platelet count in the past. She has not had any lab work done since being in [place]. She denies any active bleeding including but not limited to bleeding, gums, bloody nose, hematuria, dark black tarry stools, or eva red blood when moving her bowels.      She tells me that she does consume EtOH, not frequently, however when she does, she does consume a lot. The last time she drank a significant volume was on [birth year] for her birthday.     Past Medical History:  Past Medical History:    Ovarian cyst       Past Surgical History:  History reviewed. No pertinent surgical history.    Family Medical History:  History reviewed. No pertinent family history.    Psychosocial History:  Social History     Socioeconomic History    Marital status:      Spouse name: Not on file    Number of children: Not on file    Years of education: Not on file    Highest education level: Not on file   Occupational History    Not on file   Tobacco Use    Smoking status: Never    Smokeless tobacco: Never   Substance and Sexual Activity    Alcohol use: Yes     Comment: socially    Drug use: Never    Sexual activity: Not on file   Other Topics Concern    Not on file   Social History Narrative    Not on file     Social Determinants of Health     Financial Resource Strain: Not on file   Food Insecurity: No Food Insecurity (6/25/2024)    Food Insecurity     Food Insecurity: Never true   Transportation Needs: No  Transportation Needs (2024)    Transportation Needs     Lack of Transportation: No     Car Seat: Not on file   Physical Activity: Not on file   Stress: Not on file   Social Connections: Not on file   Housing Stability: Low Risk  (2024)    Housing Stability     Housing Instability: No     Housing Instability Emergency: Not on file     Crib or Bassinette: Not on file       Allergies:   Allergies   Allergen Reactions    Advil [Ibuprofen] ITCHING       Current Medications:   [COMPLETED] gadoterate meglumine (Dotarem) 7.5 MMOL/15ML injection 15 mL  15 mL Intravenous ONCE PRN    HYDROmorphone (Dilaudid) 1 MG/ML injection 0.1 mg  0.1 mg Intravenous Q2H PRN    Or    HYDROmorphone (Dilaudid) 1 MG/ML injection 0.2 mg  0.2 mg Intravenous Q2H PRN    Or    HYDROmorphone (Dilaudid) 1 MG/ML injection 0.4 mg  0.4 mg Intravenous Q2H PRN    [Held by provider] enoxaparin (Lovenox) 40 MG/0.4ML SUBQ injection 40 mg  40 mg Subcutaneous Daily    [COMPLETED] magnesium sulfate in sterile water for injection 2 g/50mL IVPB premix 2 g  2 g Intravenous Once    lactated ringers infusion   Intravenous Continuous    oxyCODONE immediate release tab 5 mg  5 mg Oral Q4H PRN    [COMPLETED] sodium chloride 0.9 % IV bolus 1,000 mL  1,000 mL Intravenous Once    [COMPLETED] iopamidol 76% (ISOVUE-370) injection for power injector  80 mL Intravenous ONCE PRN    pantoprazole (Protonix) DR tab 20 mg  20 mg Oral QAM AC    sodium chloride 0.9% infusion   Intravenous Continuous    ondansetron (Zofran) 4 MG/2ML injection 4 mg  4 mg Intravenous Q6H PRN    prochlorperazine (Compazine) 10 MG/2ML injection 5 mg  5 mg Intravenous Q8H PRN    piperacillin-tazobactam (Zosyn) 3.375 g in dextrose 5% 100 mL IVPB-ADDV  3.375 g Intravenous Q8H    [COMPLETED] indocyanine green (IC-Green) injection 5 mg  5 mg Intravenous Once    [] lactated ringers IV bolus 500 mL  500 mL Intravenous Once PRN    [] atropine 0.1 MG/ML injection 0.5 mg  0.5 mg Intravenous  PRN    [] naloxone (Narcan) 0.4 MG/ML injection 0.08 mg  0.08 mg Intravenous PRN    [] fentaNYL (Sublimaze) 50 mcg/mL injection 25 mcg  25 mcg Intravenous Q5 Min PRN    Or    [] fentaNYL (Sublimaze) 50 mcg/mL injection 50 mcg  50 mcg Intravenous Q5 Min PRN    [] HYDROmorphone (Dilaudid) 1 MG/ML injection 0.2 mg  0.2 mg Intravenous Q5 Min PRN    Or    [] HYDROmorphone (Dilaudid) 1 MG/ML injection 0.4 mg  0.4 mg Intravenous Q5 Min PRN    Or    [] HYDROmorphone (Dilaudid) 1 MG/ML injection 0.6 mg  0.6 mg Intravenous Q5 Min PRN    [] indomethacin (Indocin) 100 MG rectal suppository        [COMPLETED] ondansetron (Zofran) 4 MG/2ML injection 4 mg  4 mg Intravenous Once       Home Medications:  No current facility-administered medications on file prior to encounter.     Current Outpatient Medications on File Prior to Encounter   Medication Sig    pantoprazole 20 MG Oral Tab EC Take 1 tablet (20 mg total) by mouth every morning before breakfast.       Review of Systems:  A comprehensive 14 point review of systems was completed.  Pertinent positives and negatives noted in the the HPI.    Allergies:  Allergies   Allergen Reactions    Advil [Ibuprofen] ITCHING         Vital Signs:  /65 (BP Location: Right arm)   Pulse 68   Temp 97.4 °F (36.3 °C) (Temporal)   Resp 13   Ht 1.575 m (5' 2\")   Wt 61.2 kg (135 lb)   SpO2 98%   BMI 24.69 kg/m²     Last 3 Weights   24 1417 61.2 kg (135 lb)   24 1949 61.2 kg (135 lb)   24 1943 61.2 kg (134 lb 14.7 oz)       Physical Examination:  General: Patient is alert and oriented x 3, not in acute distress.  Vital Signs: /65 (BP Location: Right arm)   Pulse 68   Temp 97.4 °F (36.3 °C) (Temporal)   Resp 13   Ht 1.575 m (5' 2\")   Wt 61.2 kg (135 lb)   SpO2 98%   BMI 24.69 kg/m²   HEENT: EOMs intact. PERRL. Oropharynx is clear.   Chest: Respirations non-labored   Heart: Regular rate and rhythm.   Abdomen:  Soft, non tender, non-distended; (+) STACIE drain w/bilious/bloody draining (improved per patient)  Extremities: No lower extremity edema.  Neurological: Grossly intact.   Psych/Depression: mood and affect are appropriate    Labs:  Recent Results (from the past 24 hour(s))   Comp Metabolic Panel (14)    Collection Time: 06/28/24  6:44 AM   Result Value Ref Range    Glucose 82 70 - 99 mg/dL    Sodium 137 136 - 145 mmol/L    Potassium 3.6 3.5 - 5.1 mmol/L    Chloride 106 98 - 112 mmol/L    CO2 24.0 21.0 - 32.0 mmol/L    Anion Gap 7 0 - 18 mmol/L    BUN 7 (L) 9 - 23 mg/dL    Creatinine 0.37 (L) 0.55 - 1.02 mg/dL    Calcium, Total 7.9 (L) 8.5 - 10.1 mg/dL    Calculated Osmolality 281 275 - 295 mOsm/kg    eGFR-Cr 129 >=60 mL/min/1.73m2     (H) 15 - 37 U/L     (H) 13 - 56 U/L    Alkaline Phosphatase 101 (H) 37 - 98 U/L    Bilirubin, Total 3.1 (H) 0.1 - 2.0 mg/dL    Total Protein 6.0 (L) 6.4 - 8.2 g/dL    Albumin 2.3 (L) 3.4 - 5.0 g/dL    Globulin  3.7 2.8 - 4.4 g/dL    A/G Ratio 0.6 (L) 1.0 - 2.0   Magnesium    Collection Time: 06/28/24  6:44 AM   Result Value Ref Range    Magnesium 2.1 1.6 - 2.6 mg/dL   CBC W/ DIFFERENTIAL    Collection Time: 06/28/24  6:44 AM   Result Value Ref Range    WBC 9.4 4.0 - 11.0 x10(3) uL    RBC 4.07 3.80 - 5.30 x10(6)uL    HGB 12.2 12.0 - 16.0 g/dL    HCT 35.4 35.0 - 48.0 %    PLT 67.0 (L) 150.0 - 450.0 10(3)uL    MCV 87.0 80.0 - 100.0 fL    MCH 30.0 26.0 - 34.0 pg    MCHC 34.5 31.0 - 37.0 g/dL    RDW 13.2 %    Neutrophil Absolute Prelim 7.30 1.50 - 7.70 x10 (3) uL    Neutrophil Absolute 7.30 1.50 - 7.70 x10(3) uL    Lymphocyte Absolute 1.04 1.00 - 4.00 x10(3) uL    Monocyte Absolute 0.84 0.10 - 1.00 x10(3) uL    Eosinophil Absolute 0.13 0.00 - 0.70 x10(3) uL    Basophil Absolute 0.02 0.00 - 0.20 x10(3) uL    Immature Granulocyte Absolute 0.08 0.00 - 1.00 x10(3) uL    Neutrophil % 77.5 %    Lymphocyte % 11.1 %    Monocyte % 8.9 %    Eosinophil % 1.4 %    Basophil % 0.2 %    Immature  Granulocyte % 0.9 %     CBC:    Lab Results   Component Value Date    WBC 9.4 06/28/2024    WBC 15.5 (H) 06/27/2024    WBC 7.3 06/26/2024     Lab Results   Component Value Date    HGB 12.2 06/28/2024    HGB 12.5 06/27/2024    HGB 13.1 06/26/2024      Lab Results   Component Value Date    PLT 67.0 (L) 06/28/2024    PLT 85.0 (L) 06/27/2024    PLT 99.0 (L) 06/26/2024       Radiology:    MRI ABDOMEN AND MRCP W/3D (W+W0)(CPT=74183/27916)    Result Date: 6/28/2024  CONCLUSION:  1. The exam is limited due to breathing/motion artifact. 2. Status post cholecystectomy.  There are numerous surgical clips in the region the gallbladder fossa causing marked magnetic susceptibility artifact. 3. There is mild intrahepatic biliary tree dilation.  The common bile duct is not seen with certainty.  The pancreatic duct is normal.  The pancreas is normal there is no evidence of pancreatitis. 4. Small amount of fluid/ascites in the gallbladder fossa, right subhepatic region, right pericolic gutter.   LOCATION:  [facility]    Dictated by (CST): Rafael Mendez MD on 6/28/2024 at 1:16 AM     Finalized by (CST): Rafael Mendez MD on 6/28/2024 at 1:34 AM       XR ENDO BILE DUCT (CPT=74328)    Result Date: 6/27/2024  CONCLUSION:  Fluoroscopy and 3 images are provided for an ERCP.   LOCATION:  [facility]   Dictated by (CST): Rafael Mendez MD on 6/27/2024 at 8:39 PM     Finalized by (CST): Rafael Mendez MD on 6/27/2024 at 8:39 PM       NM GB HEPATOBILIARY SPECT/CT (SINGLE) 1 DAY (CPT=78226/29719)    Result Date: 6/27/2024  CONCLUSION:  There is radiotracer extravasation accumulating in the gallbladder fossa and focus in the right upper quadrant compatible with bile leak.  Extensive subcutaneous emphysema overlying the chest wall and abdominal wall extending into the right upper extremity.  Free air and small amount of free fluid consistent with patient's recent cholecystectomy.  Common bile duct stent.  Findings phoned to nurse Tejeda  Luis M on 6/27/2024 at the time of this dictation.   LOCATION:  [facility]    Dictated by (CST): Nanda Marie MD on 6/27/2024 at 12:07 PM     Finalized by (CST): Nanda Marie MD on 6/27/2024 at 12:21 PM          Impression/Plan    Thrombocytopenia       - Multifactorial with acute drop since admission in setting of acute medical issues, drug effect from antibiotics, consumption effect, +/- nutrient deficiency, and possible chronicity although hard to definitively say with lack of previous records       - HIPA, RAFAEL drawn and in process       - Check B12 and folate levels        - Trend platelet count and await results    Abdominal pain with obstructive Jaundice       - 2/2 choledocholithiasis s/p EUS/ERCP with biliary sphincterotomy, balloon stone extraction and PD stent placement on 6/25       - Underwent Lap Elizabet with IOC 6/26 complicated by biliary leak warranting second ERCP, which confirmed leak from cystic duct but unable to place stent d/t mid common bile duct obstruction possibly d/t CBD clip        - GI, gen surg, and surg onc all on consult, appreciate recs    Case discussed with Dr. Holguin, who is in agreement with the plan as outlined above.         Electronically Signed by:      Pilar Wheeler, RAFAL, AOCNP, AGPCNP-BC  Nurse Practitioner  Hematology and Oncology      \

## 2024-06-28 NOTE — PROGRESS NOTES
[facility]   part of [facility]     Hospitalist Progress Note     Stephy De Oliveira Patient Status:  Observation    1980 MRN [id number]   Location [facility] 4NW-A Attending Rush Mendez MD   Hosp Day # 3 PCP None Pcp     Chief Complaint: Abdominal pain    Subjective:   Patient denies nausea, vomiting. No BM. No abdominal pain. Hungry.     Current medications:   [Held by provider] enoxaparin  40 mg Subcutaneous Daily    pantoprazole  20 mg Oral QAM AC    piperacillin-tazobactam  3.375 g Intravenous Q8H       Objective:    Review of Systems:   10 point ROS completed and was negative, except for pertinent positive and negatives stated in subjective.    Vital signs:  Temp:  [97.4 °F (36.3 °C)-98.6 °F (37 °C)] 97.4 °F (36.3 °C)  Pulse:  [] 68  Resp:  [13-27] 13  BP: ()/(50-73) 100/65  SpO2:  [93 %-100 %] 98 %  Patient Weight for the past 72 hrs:   Weight   24 1949 135 lb (61.2 kg)     Physical Exam:    General: No acute distress.   Respiratory: Clear to auscultation bilaterally.   Cardiovascular: S1, S2. Regular rate and rhythm.   Abdomen: Soft, tender RUQ, nondistended.  Positive bowel sounds.   Extremities: No edema.  Neuro: AAOx3    Diagnostic Data:    Labs:  Recent Labs   Lab 24  0020 24  0635 24  0611 24  0644   WBC 9.1 7.3 15.5* 9.4   HGB 15.1 13.1 12.5 12.2   MCV 83.2 85.9 87.1 87.0   .0* 99.0* 85.0* 67.0*       Recent Labs   Lab 24  0635 24  0611 24  0644   GLU 92 93 82   BUN 6* 5* 7*   CREATSERUM 0.43* 0.50* 0.37*   CA 7.9* 7.8* 7.9*   ALB 2.3* 2.4* 2.3*    137 137   K 3.8 3.5 3.6    106 106   CO2 21.0 25.0 24.0   ALKPHO 112* 100* 101*   * 156* 146*   * 454* 431*   BILT 2.6* 3.1* 3.1*   TP 5.9* 5.7* 6.0*       Estimated Creatinine Clearance: 156.7 mL/min (A) (based on SCr of 0.37 mg/dL (L)).    No results for input(s): \"PTP\", \"INR\" in the last 168 hours.         COVID-19 Lab  Results    COVID-19  No results found for: \"COVID19\"    Pro-Calcitonin  No results for input(s): \"PCT\" in the last 168 hours.    Cardiac  No results for input(s): \"TROP\", \"PBNP\" in the last 168 hours.    Creatinine Kinase  No results for input(s): \"CK\" in the last 168 hours.    Inflammatory Markers  No results for input(s): \"CRP\", \"DENIS\", \"LDH\", \"DDIMER\" in the last 168 hours.    No results for input(s): \"TROP\", \"TROPHS\", \"CK\" in the last 168 hours.    Imaging: Imaging data reviewed in Epic.    Medications:    [Held by provider] enoxaparin  40 mg Subcutaneous Daily    pantoprazole  20 mg Oral QAM AC    piperacillin-tazobactam  3.375 g Intravenous Q8H       Assessment & Plan:    Abdominal pain with obstructive jaundice d/t choledocholithiasis sp EUS/ERCP with biliary sphincterotomy, balloon stone extraction and PD stent placement 6/26 sp lap khalif with IOC 6/26, biliary leak 6/27 sp ERCP which noted biliary leak from cystic duct, but unable to have biliary stent placed d/t mid common bile duct obstruction possibly d/t CBD clip  NPO  IVF  IV abx  Pain control  KUB (PD stent) expected ~7/2/2024  GI & Surgery consult  SurgOnc consult   Thrombocytopenia, sepsis/meds/consumptive/HIT?  HIPA  Monitor counts  Hematology consult     Supplementary Documentation:   Quality:  DVT Prophylaxis: Lovenox - hold     At this point Ms. De Oliveira is expected to be discharge to: Home    Plan of care discussed with patient and family.    Moshe López MD

## 2024-06-28 NOTE — DIETARY NOTE
[facility]   part of [facility]    NUTRITION ASSESSMENT    Pt does not meet malnutrition criteria at this time.    NUTRITION INTERVENTION:    Meal and Snacks - Monitor and encourage adequate PO intake. NPO/Clear Liquid status x 4 days.   Medical Food Supplements - Apple Ensure Clear TID. Rationale/use for oral supplements discussed.  Nutrition Support-if diet is unable to advance to at least Full Liquid diet within 48-72 hours, consider initiating PN, as medically feasible.       PATIENT STATUS:     6/28/24- 45 y/o female admitted with elevated LFT's, jaundice and abdominal pain (starting 3 days PTA). Pt seen d/t NPO/Clear Liquid status x 4 days. Diet advanced to Clear Liquid. Used video Upper sorbian interpretor via [employer]. Pt stated her appetite was normal PTA. Denied any unintentional wt loss and stated wt has been stable.  Pt found to have obstructive jaundice d/t choledocholithiasis.  -6/25: EUS/EGD, dilation of CBD, ERCP w/ biliary sphincterotomy, balloon stone extraction and PD stent placement.   -6/26:lap cholecystectomy w/ intraoperative cholangiogram and ICG  -6/27: ERCP, found bile leak, unable to place biliary stent  Will monitor for diet advancement vs need for nutrition support.   PMH:ovarian cyst, oophorectomy.       ANTHROPOMETRICS:  Ht: 157.5 cm (5' 2\")  Wt: 61.2 kg (135 lb).   BMI: Body mass index is 24.69 kg/m².  IBW: 50 kg      WEIGHT HISTORY:   Pt denied any unintentional wt loss. Reported usual wt~135 lb.    Wt Readings from Last 10 Encounters:   06/25/24 61.2 kg (135 lb)   06/27/24 61.2 kg (134 lb 14.7 oz)        NUTRITION:  Diet:       Procedures    NPO      Food Allergies: No  Cultural/Ethnic/Adventist Preferences Addressed: Yes    Percent Meals Eaten (last 3 days)       None          GI system review: abdominal pain Last BM: PTA  Skin and wounds: 4 lap sites, STACIE drain    NUTRITION RELATED PHYSICAL FINDINGS:     1. Body Fat/Muscle Mass: no wasting noted     2. Fluid  Accumulation: none per RN documentation     NUTRITION PRESCRIPTION: 61.2 kg-134 lb (6/27)  Calories: 4793-0290 calories/day (25-30 kcal/kg)  Protein: 61-73 grams protein/day ( 1.0-1.2  grams protein per kg)  Fluid: ~1 ml/kcal or per MD discretion    NUTRITION DIAGNOSIS/PROBLEM:  Inadequate energy intake related to  decreased ability to consume sufficient energy intakes  as evidenced by  NPO/Clear Liquid status x 4 days.       MONITOR AND EVALUATE/NUTRITION GOALS:  PO intake of 75% of meals TID - New  PO intake of 75% of oral nutrition supplement/s - New  Weight stable within 1 to 2 lbs during admission - New  Return to PO intake or advance diet in 24-48 hrs - New  Start alternative nutrition in 24-48 hrs if diet is not able to advance- New      MEDICATIONS:  IVF:NS at 100 ml/hr, IV abx, Protonix    LABS:  Glu:82, elevated LFT's    Pt is at High nutrition risk    Kassi Peres MS, RD, LDN  Clinical Dietitian  Ext:[phone]

## 2024-06-28 NOTE — PLAN OF CARE
2nd day post lap choley, still draining bile from the STACIE drain, emptied 50 l bile secretions this morning, had almost 100 ml overnight, liver enzymes are still elevated, bilirubin 3.1. Patient is alert and oriented, denies pain.  Seen by Dr Pereyra, discussed results of MRCP and ERCP, consulted surgical oncologist for possible injury to CBD, CT of the liver was ordered. Hematologist was consulted for thrombocytopenia, latest PLT count 67.0, no signs of bleeding noted. Family at bedside.   1500 was sent back from CT, unable to perform test due to artifacts from previous contrasts suggested enema to clean the patient then KUB, oncologist was notified.   1833 KUB was completed, notified PA with results.   Problem: GASTROINTESTINAL - ADULT  Goal: Minimal or absence of nausea and vomiting  Description: INTERVENTIONS:  - Maintain adequate hydration with IV or PO as ordered and tolerated  - Nasogastric tube to low intermittent suction as ordered  - Evaluate effectiveness of ordered antiemetic medications  - Provide nonpharmacologic comfort measures as appropriate  - Advance diet as tolerated, if ordered  - Obtain nutritional consult as needed  - Evaluate fluid balance  Outcome: Progressing     Problem: METABOLIC/FLUID AND ELECTROLYTES - ADULT  Goal: Hemodynamic stability and optimal renal function maintained  Description: INTERVENTIONS:  - Monitor labs and assess for signs and symptoms of volume excess or deficit  - Monitor intake, output and patient weight  - Monitor urine specific gravity, serum osmolarity and serum sodium as indicated or ordered  - Monitor response to interventions for patient's volume status, including labs, urine output, blood pressure (other measures as available)  - Encourage oral intake as appropriate  - Instruct patient on fluid and nutrition restrictions as appropriate  Outcome: Not Progressing

## 2024-06-28 NOTE — PROGRESS NOTES
[facility]                       Gastroenterology Follow up Note - [facility]    [facility] Lavonne De Oliveira Patient Status:  Inpatient    1980 MRN [id number]   Location [facility] 4NW-A Attending Moshe López MD   Hosp Day # 3 PCP None Pcp     Reason for consultation: Bile Leak  Subjective: Patient had ERCP yesterday which confirmed bile leak, however contrast did not opacify the proximal common bile duct concerning for obstruction or common bile duct clip.  MRI did not adequately visualize the common bile duct.  Patient denies abdominal pain, nausea, emesis.  Review of Systems:   10 point ROS completed and was negative, except for pertinent positive and negatives stated in subjective.    For PMH, PSH, FHx and SHx- please see initial consult note.     Objective: /65 (BP Location: Right arm)   Pulse 68   Temp 97.4 °F (36.3 °C) (Temporal)   Resp 13   Ht 5' 2\" (1.575 m)   Wt 135 lb (61.2 kg)   SpO2 98%   BMI 24.69 kg/m²   Gen: No acute distress  Resp: no respiratory distress  Abd: Soft, non-tender, non-distended. No rebound tenderness, no guarding.   Neuro: Aox3.     Labs:   Lab Results   Component Value Date    WBC 9.4 2024    HGB 12.2 2024    HCT 35.4 2024    PLT 67.0 2024    CREATSERUM 0.37 2024    BUN 7 2024     2024    K 3.6 2024     2024    CO2 24.0 2024    GLU 82 2024    CA 7.9 2024    ALB 2.3 2024    ALKPHO 101 2024    BILT 3.1 2024     2024     2024    MG 2.1 2024     Recent Labs   Lab 24  0635 24  0611 24  0644   GLU 92 93 82   BUN 6* 5* 7*   CREATSERUM 0.43* 0.50* 0.37*   CA 7.9* 7.8* 7.9*    137 137   K 3.8 3.5 3.6    106 106   CO2 21.0 25.0 24.0     Recent Labs   Lab 24  0644   RBC 4.07   HGB 12.2   HCT 35.4   MCV 87.0   MCH 30.0   MCHC 34.5   RDW 13.2   NEPRELIM 7.30   WBC 9.4   PLT 67.0*        Recent Labs   Lab 06/26/24  0635 06/27/24  0611 06/28/24  0644   * 454* 431*   * 156* 146*       Assessment:  Bile leak status post ERCP yesterday unable to place stent due to inability to visualize the proximal common bile duct concerning for obstruction or clip placement  Elevated liver enzymes secondary to bile leak and previous choledocholithiasis.  Choledocholithiasis status post ERCP previously with stone removal  Plan:  N.p.o. with further advancement of diet per surgery  General surgery and surgical oncology following.  Appreciate recommendations  At this time, given concern for bile duct injury, will defer any further workup and management to general surgery and surgical oncology teams.  At this time GI will sign off.  Please call us back with any further questions or concerns. Follow up with NP or myself in 2-3 weeks.    Thank you for allowing us to participate in patient's care. Please call us with any questions or concerns.  The GI consult service will continue to follow.     Jason Boyce DO  [facility]  [phone]

## 2024-06-28 NOTE — CONSULTS
[facility]        Patient Name:  Stephy De Oliveira   YOB: 1980   Gender:  Female   Appt Date:  6/28/2024   Provider:  FLAVIA Bernal       CHIEF COMPLAINT  Chief Complaint   Patient presents with    Abdomen/Flank Pain    Jaundice        PROBLEMS  Reviewed   Patient Active Problem List   Diagnosis    Elevated liver enzymes    Elevated bilirubin    Common bile duct (CBD) obstruction (HCC)    Calculus of gallbladder with acute cholecystitis and obstruction    Choledocholithiasis        History of Present Illness:  Patient is a 44 year old women who is currently being referred for consideration of surgical oncology management of bile leak and obstruction on ERCP.     Patient presented to the ED on 06/25 with abdominal pain and jaundice. Upon arrival to hospital patient was afebrile and labs revealed WBC 9.1 hemoglobin 15.1 platelets 113,000.  Alkaline phosphatase 163   total bilirubin 8.0.  Lipase 33. Patient had elevated LFTs and CT abdomen and pelvis done which showed cholelithiasis and biliary sludge extending into the common bile duct with moderate upstream biliary dilation.  The common bile duct measures up to 9 millimeters.     Then Sabino Sheriff performed a EUS which revealed the common bile duct was dilated with multiple echogenic filling defects with shadowing. He preceded with an ERCP, biliary sphincterotomy, and balloon stone extraction  which showed the CBD and the CHD were dilated with multiple filling defects cw choledocholithiasis, the confluence of the right and left hepatic ducts and the intrahepatics appear wnl, the cystic duct and the gallbladder were not visualized.     Dr. Pereyra performed a laparoscopic cholecystectomy with intraoperative cholangiogram.     GI ordered hida scan: Patient was seen by surgery service with notes of bilious output in STACIE drain. Stat HIDA scan with radiotracer extravasation accumulating in the gallbladder  fossa, c/w bile leak with extensive subcutaneous emphysema overlying the chest wall and abdominal wall extending into the right upper extremity.     Biliary stent placement with Dr. Desai: Unable to place biliary stent due to mid-common bile duct obstruction. This may be due to a CBD clip proximal to the cystic duct. A bile leak is present eminating from the cystic duct.     Patient is s/p laparoscopic cholecystectomy, she is POD #2 with positive HIDA scan for biliary leak. Dr. Desai performed an ERCP to place a biliary stent due to elevated bilirubin and      Vital Signs:  /65 (BP Location: Right arm)   Pulse 68   Temp 97.4 °F (36.3 °C) (Temporal)   Resp 13   Ht 1.575 m (5' 2\")   Wt 61.2 kg (135 lb)   SpO2 98%   BMI 24.69 kg/m²      Medications Reviewed:  No current facility-administered medications on file prior to encounter.     Current Outpatient Medications on File Prior to Encounter   Medication Sig Dispense Refill    pantoprazole 20 MG Oral Tab EC Take 1 tablet (20 mg total) by mouth every morning before breakfast.            Allergies Reviewed:  Allergies   Allergen Reactions    Advil [Ibuprofen] ITCHING        History:  Reviewed:  Past Medical History:    Ovarian cyst      Reviewed:  History reviewed. No pertinent surgical history.   Reviewed Social History:  Social History     Socioeconomic History    Marital status:    Tobacco Use    Smoking status: Never    Smokeless tobacco: Never   Substance and Sexual Activity    Alcohol use: Yes     Comment: socially    Drug use: Never     Social Determinants of Health     Food Insecurity: No Food Insecurity (6/25/2024)    Food Insecurity     Food Insecurity: Never true   Transportation Needs: No Transportation Needs (6/25/2024)    Transportation Needs     Lack of Transportation: No   Housing Stability: Low Risk  (6/25/2024)    Housing Stability     Housing Instability: No      Reviewed:  History reviewed. No pertinent family history.    Review of Systems:  GENERAL HEALTH: feels well, no fatigue.   RESPIRATORY: denies shortness of breath  CARDIOVASCULAR: denies chest pain,  GI: denies nausea, vomiting, constipation, diarrhea; no rectal bleeding  GENITAL/: no blood in urine  MUSCULOSKELETAL: no joint complaints, no back pain  NEURO: no tingling, numbness, weakness  ENDOCRINE: denies weight loss/gain       Physical Examination:  Constitutional: NAD.   Eyes: Sclera: non-icteric.   Neck: Neck: supple.   Lymph Nodes: Lymph Nodes no cervical LAD, supraclavicular LAD, axillary LAD, or inguinal LAD.   Abdomen:soft and nontender except RUQ. No guarding or peritonitis. Drain NON-BILIOUS and serous sang  Musculoskeletal: Extremities: no edema.   Skin: Inspection and palpation: no jaundice.      Document Review:  06/25/2024 CT abdomen and pelvis   Cholelithiasis and biliary sludge extending into the common bile duct with moderate upstream biliary dilation.  The common bile duct measures up to 9 millimeters.  There is cholelithiasis within the cystic duct, no evidence of acute   cholecystitis at this time.  Consider HIDA scan if there is concern for cystic duct obstruction.     06/28/2024 MRI abdomen   1. The exam is limited due to breathing/motion artifact.   2. Status post cholecystectomy.  There are numerous surgical clips in the region the gallbladder fossa causing marked magnetic susceptibility artifact.   3. There is mild intrahepatic biliary tree dilation.  The common bile duct is not seen with certainty.  The pancreatic duct is normal.  The pancreas is normal there is no evidence of pancreatitis.   4. Small amount of fluid/ascites in the gallbladder fossa, right subhepatic region, right pericolic gutter.       nal Diagnosis:   Gallbladder, cholecystectomy:  -Acute on chronic cholecystitis.     ERCP      Lab Results   Component Value Date    WBC 9.4 06/28/2024    HGB 12.2 06/28/2024    HCT 35.4 06/28/2024    PLT 67.0 06/28/2024    CREATSERUM 0.37  06/28/2024    BUN 7 06/28/2024     06/28/2024    K 3.6 06/28/2024     06/28/2024    CO2 24.0 06/28/2024    GLU 82 06/28/2024    CA 7.9 06/28/2024    ALB 2.3 06/28/2024    ALKPHO 101 06/28/2024    BILT 3.1 06/28/2024    TP 6.0 06/28/2024     06/28/2024     06/28/2024    MG 2.1 06/28/2024          Procedure(s):  None     Assessment / Plan:  S/P cholecystectomy  -Bile leak, possible bile duct injury  Now drain non-bilious.   Discussed with patient and son via phone language line and Nepali .  CT triple phase requested by Dr Piedra.  Given positive change in drain color, would continue to observe for now.  There are no acute surgical issues.  Patient understands that surgery may me required at this time.  She and son had ample to ask questions.    Thrombocytopenia  -Heme on consult              Electronically Signed by:   Jian Bernardo MD

## 2024-06-29 LAB
ALBUMIN SERPL-MCNC: 2.4 G/DL (ref 3.4–5)
ALBUMIN/GLOB SERPL: 0.7 {RATIO} (ref 1–2)
ALP LIVER SERPL-CCNC: 97 U/L
ALT SERPL-CCNC: 389 U/L
ANION GAP SERPL CALC-SCNC: 6 MMOL/L (ref 0–18)
AST SERPL-CCNC: 118 U/L (ref 15–37)
BILIRUB SERPL-MCNC: 2.2 MG/DL (ref 0.1–2)
BUN BLD-MCNC: 7 MG/DL (ref 9–23)
CALCIUM BLD-MCNC: 8 MG/DL (ref 8.5–10.1)
CHLORIDE SERPL-SCNC: 107 MMOL/L (ref 98–112)
CO2 SERPL-SCNC: 23 MMOL/L (ref 21–32)
CREAT BLD-MCNC: 0.56 MG/DL
EGFRCR SERPLBLD CKD-EPI 2021: 117 ML/MIN/1.73M2 (ref 60–?)
ERYTHROCYTE [DISTWIDTH] IN BLOOD BY AUTOMATED COUNT: 13.2 %
GLOBULIN PLAS-MCNC: 3.6 G/DL (ref 2.8–4.4)
GLUCOSE BLD-MCNC: 90 MG/DL (ref 70–99)
HCT VFR BLD AUTO: 33.5 %
HEPARIN AB PPP QL PL AGG: NEGATIVE
HGB BLD-MCNC: 11.7 G/DL
MCH RBC QN AUTO: 29.8 PG (ref 26–34)
MCHC RBC AUTO-ENTMCNC: 34.9 G/DL (ref 31–37)
MCV RBC AUTO: 85.2 FL
OSMOLALITY SERPL CALC.SUM OF ELEC: 280 MOSM/KG (ref 275–295)
PLATELET # BLD AUTO: 77 10(3)UL (ref 150–450)
POTASSIUM SERPL-SCNC: 3.6 MMOL/L (ref 3.5–5.1)
PROT SERPL-MCNC: 6 G/DL (ref 6.4–8.2)
RBC # BLD AUTO: 3.93 X10(6)UL
SODIUM SERPL-SCNC: 136 MMOL/L (ref 136–145)
WBC # BLD AUTO: 8.7 X10(3) UL (ref 4–11)

## 2024-06-29 PROCEDURE — 99231 SBSQ HOSP IP/OBS SF/LOW 25: CPT | Performed by: HOSPITALIST

## 2024-06-29 PROCEDURE — 99232 SBSQ HOSP IP/OBS MODERATE 35: CPT | Performed by: SPECIALIST

## 2024-06-29 RX ORDER — POLYETHYLENE GLYCOL 3350 17 G/17G
17 POWDER, FOR SOLUTION ORAL DAILY PRN
Status: DISCONTINUED | OUTPATIENT
Start: 2024-06-29 | End: 2024-07-02

## 2024-06-29 RX ORDER — DOCUSATE SODIUM 100 MG/1
100 CAPSULE, LIQUID FILLED ORAL 2 TIMES DAILY
Status: DISCONTINUED | OUTPATIENT
Start: 2024-06-29 | End: 2024-07-02

## 2024-06-29 NOTE — PLAN OF CARE
Pt a/o x4. Georgian speaking. VSS on RA. No c/o pain. Meds per MAR. IVF per order. STACIE drain C/D/I. Outputs recorded on flowsheets. CT called this writer to verify CT order & enemas use. Relayed to on call MD. No new orders for now.     Call light within reach.

## 2024-06-29 NOTE — PROGRESS NOTES
[facility] Progress Note      Patient Name: Stephy De Oliveira   YOB: 1980  Medical Record Number: [id number]  Attending Physician: Marquis Holguin M.D.     The 21st Century Cures Act makes medical notes like these available to patients in the interest of transparency. Please be advised this is a medical document. Medical documents are intended to carry relevant information, facts as evident, and the clinical opinion of the practitioner. The medical note is intended as peer to peer communication and may appear blunt or direct. It is written in medical language and may contain abbreviations or verbiage that are unfamiliar.      SUBJECTIVE:  Stephy De Oliveira is a(n) 44 year old female. Hematology consulted for thrombocytopenia. Per obvious bleeding.    OBJECTIVE:  Vitals  Blood pressure 101/62, pulse 55, temperature 98.3 °F (36.8 °C), temperature source Oral, resp. rate 21, height 1.575 m (5' 2\"), weight 61.2 kg (135 lb), SpO2 99%.    Physical Examination  Constitutional NAD.  Head  Normocephalic and atraumatic.  Eyes  Conjunctiva clear; sclera anicteric.  ENMT  External nose normal; external ears normal.  Respiratory Normal effort; no respiratory distress.  Cardiovascular Regular rate and rhythm.  Hematology No petechiae.    Labs  Recent Results (from the past 24 hour(s))   Vitamin B12    Collection Time: 06/28/24  2:02 PM   Result Value Ref Range    Vitamin B12 >2,000 (H) 193 - 986 pg/mL   Folic Acid Serum (Folate)    Collection Time: 06/28/24  2:02 PM   Result Value Ref Range    Folate (Folic Acid) 17.8 >=8.7 ng/mL   Prothrombin Time (PT)    Collection Time: 06/28/24  2:02 PM   Result Value Ref Range    PT 13.7 11.6 - 14.8 seconds    INR 1.05 0.80 - 1.20   PTT, Activated    Collection Time: 06/28/24  2:02 PM   Result Value Ref Range    PTT 28.6 23.0 - 36.0 seconds   Fibrinogen Activity    Collection Time: 06/28/24  2:02 PM   Result Value Ref Range    Fibrinogen 444 200 - 480  mg/dl   CBC, Platelet; No Differential    Collection Time: 06/29/24  7:32 AM   Result Value Ref Range    WBC 8.7 4.0 - 11.0 x10(3) uL    RBC 3.93 3.80 - 5.30 x10(6)uL    HGB 11.7 (L) 12.0 - 16.0 g/dL    HCT 33.5 (L) 35.0 - 48.0 %    PLT 77.0 (L) 150.0 - 450.0 10(3)uL    MCV 85.2 80.0 - 100.0 fL    MCH 29.8 26.0 - 34.0 pg    MCHC 34.9 31.0 - 37.0 g/dL    RDW 13.2 %   Comp Metabolic Panel (14)    Collection Time: 06/29/24  7:32 AM   Result Value Ref Range    Glucose 90 70 - 99 mg/dL    Sodium 136 136 - 145 mmol/L    Potassium 3.6 3.5 - 5.1 mmol/L    Chloride 107 98 - 112 mmol/L    CO2 23.0 21.0 - 32.0 mmol/L    Anion Gap 6 0 - 18 mmol/L    BUN 7 (L) 9 - 23 mg/dL    Creatinine 0.56 0.55 - 1.02 mg/dL    Calcium, Total 8.0 (L) 8.5 - 10.1 mg/dL    Calculated Osmolality 280 275 - 295 mOsm/kg    eGFR-Cr 117 >=60 mL/min/1.73m2     (H) 15 - 37 U/L     (H) 13 - 56 U/L    Alkaline Phosphatase 97 37 - 98 U/L    Bilirubin, Total 2.2 (H) 0.1 - 2.0 mg/dL    Total Protein 6.0 (L) 6.4 - 8.2 g/dL    Albumin 2.4 (L) 3.4 - 5.0 g/dL    Globulin  3.6 2.8 - 4.4 g/dL    A/G Ratio 0.7 (L) 1.0 - 2.0       Medications   docusate sodium (Colace) cap 100 mg  100 mg Oral BID    polyethylene glycol (PEG 3350) (Miralax) 17 g oral packet 17 g  17 g Oral Daily PRN    [COMPLETED] gadoterate meglumine (Dotarem) 7.5 MMOL/15ML injection 15 mL  15 mL Intravenous ONCE PRN    HYDROmorphone (Dilaudid) 1 MG/ML injection 0.1 mg  0.1 mg Intravenous Q2H PRN    Or    HYDROmorphone (Dilaudid) 1 MG/ML injection 0.2 mg  0.2 mg Intravenous Q2H PRN    Or    HYDROmorphone (Dilaudid) 1 MG/ML injection 0.4 mg  0.4 mg Intravenous Q2H PRN    [Held by provider] enoxaparin (Lovenox) 40 MG/0.4ML SUBQ injection 40 mg  40 mg Subcutaneous Daily    [COMPLETED] magnesium sulfate in sterile water for injection 2 g/50mL IVPB premix 2 g  2 g Intravenous Once    lactated ringers infusion   Intravenous Continuous    oxyCODONE immediate release tab 5 mg  5 mg Oral Q4H PRN     [COMPLETED] sodium chloride 0.9 % IV bolus 1,000 mL  1,000 mL Intravenous Once    [COMPLETED] iopamidol 76% (ISOVUE-370) injection for power injector  80 mL Intravenous ONCE PRN    pantoprazole (Protonix) DR tab 20 mg  20 mg Oral QAM AC    sodium chloride 0.9% infusion   Intravenous Continuous    ondansetron (Zofran) 4 MG/2ML injection 4 mg  4 mg Intravenous Q6H PRN    prochlorperazine (Compazine) 10 MG/2ML injection 5 mg  5 mg Intravenous Q8H PRN    piperacillin-tazobactam (Zosyn) 3.375 g in dextrose 5% 100 mL IVPB-ADDV  3.375 g Intravenous Q8H    [COMPLETED] indocyanine green (IC-Green) injection 5 mg  5 mg Intravenous Once    [] lactated ringers IV bolus 500 mL  500 mL Intravenous Once PRN    [] atropine 0.1 MG/ML injection 0.5 mg  0.5 mg Intravenous PRN    [] naloxone (Narcan) 0.4 MG/ML injection 0.08 mg  0.08 mg Intravenous PRN    [] fentaNYL (Sublimaze) 50 mcg/mL injection 25 mcg  25 mcg Intravenous Q5 Min PRN    Or    [] fentaNYL (Sublimaze) 50 mcg/mL injection 50 mcg  50 mcg Intravenous Q5 Min PRN    [] HYDROmorphone (Dilaudid) 1 MG/ML injection 0.2 mg  0.2 mg Intravenous Q5 Min PRN    Or    [] HYDROmorphone (Dilaudid) 1 MG/ML injection 0.4 mg  0.4 mg Intravenous Q5 Min PRN    Or    [] HYDROmorphone (Dilaudid) 1 MG/ML injection 0.6 mg  0.6 mg Intravenous Q5 Min PRN    [] indomethacin (Indocin) 100 MG rectal suppository        [COMPLETED] ondansetron (Zofran) 4 MG/2ML injection 4 mg  4 mg Intravenous Once     Assessment and Plan  Thrombocytopenia: In large part this is due to the acute illness as it decreased over the course of the hospitalization and is improved today. As there was a mild thrombocytopenia upon admission, baseline is unknown. Lab studies show no evidence of DIC, B12 is normal. HIPA and RAFAEL are pending but patient was thrombocytopenic on admission so HIT unlikely.    Infection certainly a possibility. Patient is on antibiotics  and no blood cultures were performed prior to start so cannot evaluate for initial bacteremia.     No hematologic intervention warranted at this time. Follow CBC for improvement of platelet count. Follow up on HIPA/RAFAEL.    Electronically signed by:    Marquis Holguin M.D.  System Medical Director of Oncology Services  [facility]

## 2024-06-29 NOTE — PROGRESS NOTES
[facility]   part of [facility]     Hospitalist Progress Note     Stephy De Oliveira Patient Status:  Observation    1980 MRN [id number]   Location [facility] 4NW-A Attending Rush Mendez MD   Hosp Day # 4 PCP None Pcp     Chief Complaint: Abdominal pain    Subjective:   Patient denies abdominal pain. No nausea or vomiting. +BM  Tolerating diet.     Current medications:   docusate sodium  100 mg Oral BID    [Held by provider] enoxaparin  40 mg Subcutaneous Daily    pantoprazole  20 mg Oral QAM AC    piperacillin-tazobactam  3.375 g Intravenous Q8H       Objective:    Review of Systems:   10 point ROS completed and was negative, except for pertinent positive and negatives stated in subjective.    Vital signs:  Temp:  [97.5 °F (36.4 °C)-98.3 °F (36.8 °C)] 98 °F (36.7 °C)  Pulse:  [55-75] 62  Resp:  [16-21] 21  BP: ()/(56-68) 94/67  SpO2:  [97 %-100 %] 99 %  Patient Weight for the past 72 hrs:   Weight   24 1949 135 lb (61.2 kg)     Physical Exam:    General: No acute distress.   Respiratory: Clear to auscultation bilaterally.   Cardiovascular: S1, S2. Regular rate and rhythm.   Abdomen: Soft, mildly tender RUQ, nondistended.  Positive bowel sounds.   Extremities: No edema.  Neuro: AAOx3    Diagnostic Data:    Labs:  Recent Labs   Lab 24  0020 24  0635 24  0611 24  0644 24  1402 24  0732   WBC 9.1 7.3 15.5* 9.4  --  8.7   HGB 15.1 13.1 12.5 12.2  --  11.7*   MCV 83.2 85.9 87.1 87.0  --  85.2   .0* 99.0* 85.0* 67.0*  --  77.0*   INR  --   --   --   --  1.05  --        Recent Labs   Lab 24  0611 24  0644 24  0732   GLU 93 82 90   BUN 5* 7* 7*   CREATSERUM 0.50* 0.37* 0.56   CA 7.8* 7.9* 8.0*   ALB 2.4* 2.3* 2.4*    137 136   K 3.5 3.6 3.6    106 107   CO2 25.0 24.0 23.0   ALKPHO 100* 101* 97   * 146* 118*   * 431* 389*   BILT 3.1* 3.1* 2.2*   TP 5.7* 6.0* 6.0*       Estimated Creatinine Clearance:  103.5 mL/min (based on SCr of 0.56 mg/dL).    Recent Labs   Lab 06/28/24  1402   PTP 13.7   INR 1.05            COVID-19 Lab Results    COVID-19  No results found for: \"COVID19\"    Pro-Calcitonin  No results for input(s): \"PCT\" in the last 168 hours.    Cardiac  No results for input(s): \"TROP\", \"PBNP\" in the last 168 hours.    Creatinine Kinase  No results for input(s): \"CK\" in the last 168 hours.    Inflammatory Markers  No results for input(s): \"CRP\", \"DENIS\", \"LDH\", \"DDIMER\" in the last 168 hours.    No results for input(s): \"TROP\", \"TROPHS\", \"CK\" in the last 168 hours.    Imaging: Imaging data reviewed in Epic.    Medications:    docusate sodium  100 mg Oral BID    [Held by provider] enoxaparin  40 mg Subcutaneous Daily    pantoprazole  20 mg Oral QAM AC    piperacillin-tazobactam  3.375 g Intravenous Q8H       Assessment & Plan:    Abdominal pain with obstructive jaundice d/t choledocholithiasis sp EUS/ERCP with biliary sphincterotomy, balloon stone extraction and PD stent placement 6/26 sp lap khalif with IOC 6/26, biliary leak 6/27 sp ERCP which noted biliary leak from cystic duct, but unable to have biliary stent placed d/t mid common bile duct obstruction possibly d/t CBD clip  Diet per surgery  IVF  IV abx  Pain control  KUB (PD stent) expected ~7/2/2024  OR Monday  GI, General surgery & SurgOnc consult   Thrombocytopenia, sepsis/meds/consumptive/HIT?  HIPA pending  Monitor counts  Hematology consult     Supplementary Documentation:   Quality:  DVT Prophylaxis: Lovenox - hold     At this point Ms. De Oliveira is expected to be discharge to: Home    Plan of care discussed with patient and family.    Moshe López MD

## 2024-06-29 NOTE — PROGRESS NOTES
[facility]  Progress Note    Stephy De Oliveira Patient Status:  Inpatient    1980 MRN [id number]   Location [facility] 4NW-A Attending Moshe López MD   Hosp Day # 4 PCP None Pcp     Subjective:  The patient is resting comfortably in bed today.  She is seen today with assistance of language line .  Patient states that she is doing well.  She denies abdominal pain.  She denies nausea or vomiting.  She is tolerating clear liquids well.    Objective/Physical Exam:  General: Alert, orientated x3.  Cooperative.  No apparent distress.  Vital Signs:  Blood pressure 101/62, pulse 55, temperature 98.3 °F (36.8 °C), temperature source Oral, resp. rate 21, height 5' 2\" (1.575 m), weight 135 lb (61.2 kg), SpO2 99%.  Wt Readings from Last 3 Encounters:   24 135 lb (61.2 kg)   24 134 lb 14.7 oz (61.2 kg)     Lungs: No respiratory distress.  Cardiac: Regular rate and rhythm.   Abdomen:  Soft, non distended, non tender, with no rebound or guarding.  No peritoneal signs.   Extremities:  No lower extremity edema noted.    Incision: Clean, dry, intact, no erythema  Drain: With bilious output noted    Intake/Output:    Intake/Output Summary (Last 24 hours) at 2024 1025  Last data filed at 2024 0524  Gross per 24 hour   Intake 2249 ml   Output 200 ml   Net 2049 ml     I/O last 3 completed shifts:  In: 3449 [I.V.:3302; IV PIGGYBACK:147]  Out: 400 [Drains:400]  No intake/output data recorded.    Medications:    docusate sodium  100 mg Oral BID    [Held by provider] enoxaparin  40 mg Subcutaneous Daily    pantoprazole  20 mg Oral QAM AC    piperacillin-tazobactam  3.375 g Intravenous Q8H       Labs:  Lab Results   Component Value Date    WBC 8.7 2024    HGB 11.7 2024    HCT 33.5 2024    PLT 77.0 2024     Lab Results   Component Value Date     2024    K 3.6 2024     2024    CO2 23.0 2024    BUN 7 2024    CREATSERUM 0.56  06/29/2024    GLU 90 06/29/2024    CA 8.0 06/29/2024    ALKPHO 97 06/29/2024     06/29/2024     06/29/2024    BILT 2.2 06/29/2024    ALB 2.4 06/29/2024    TP 6.0 06/29/2024     Lab Results   Component Value Date    INR 1.05 06/28/2024         Assessment  Patient Active Problem List   Diagnosis    Elevated liver enzymes    Elevated bilirubin    Common bile duct (CBD) obstruction (HCC)    Calculus of gallbladder with acute cholecystitis and obstruction    Choledocholithiasis    Thrombocytopenia (HCC)     The patient is status post laparoscopic cholecystectomy with bilious output in her drain, indicating possible common duct injury.      Plan:  The patient was discussed with Dr. Piedra and Dr. Bernardo with plans for OR on Monday by Dr. Bernardo.  The patient may start low-fat diet today.  Continue drain care.  Continue to trend LFTs.  Ambulate and up to chair  DVT prophylaxis with SCDs only. Lovenox held due to thrombocytopenia      Quality:  DVT Mechanical Prophylaxis:   SCDs,    DVT Pharmacologic Prophylaxis   Medication    [Held by provider] enoxaparin (Lovenox) 40 MG/0.4ML SUBQ injection 40 mg                Code Status: Not on file  [place]: No urinary catheter in place  [place] Duration (in days):   Central line:    NURA:         Vidhi Hewitt PA-C  6/29/2024  10:25 AM

## 2024-06-29 NOTE — PROGRESS NOTES
[facility]  Progress Note    Stephy De Oliveira Patient Status:  Inpatient    1980 MRN [id number]   Location [facility] 4NW-A Attending Moshe López MD   Hosp Day # 4 PCP None Pcp     Subjective:  She is seen and examined resting in bed.  A  service, [employer] # [id number] was used through out this encounter. She reports feeling well today. She reports minimal abdomnal pain. She denies nausea or vomiting. She reports passing flatus and denies a bowel movement    Objective/Physical Exam:  /62 (BP Location: Right arm)   Pulse 55   Temp 98.3 °F (36.8 °C) (Oral)   Resp 21   Ht 62\"   Wt 135 lb   SpO2 99%   BMI 24.69 kg/m²     Intake/Output Summary (Last 24 hours) at 2024 0837  Last data filed at 2024 0524  Gross per 24 hour   Intake 2249 ml   Output 255 ml   Net 1994 ml         General: Awake, Alert,  Cooperative.  No apparent distress.  Pulm: no respiratory distress, no increased work of breathing  Abdomen:  Soft, mildly distended,appropriate incisional tenderness, with no rebound or guarding.  No peritoneal signs.  Incision:  Clean, dry, intact without erythema.  Ecchymosis surrounding incisions.   Drains: STACIE drain in place with bilious output    Labs:  Lab Results   Component Value Date    WBC 8.7 2024    HGB 11.7 2024    HCT 33.5 2024    PLT 77.0 2024      Lab Results   Component Value Date    INR 1.05 2024     Lab Results   Component Value Date     2024    K 3.6 2024     2024    CO2 23.0 2024    BUN 7 2024    CREATSERUM 0.56 2024    GLU 90 2024    CA 8.0 2024    ALKPHO 97 2024     2024     2024    BILT 2.2 2024    ALB 2.4 2024    TP 6.0 2024            Assessment:  Patient Active Problem List   Diagnosis    Elevated liver enzymes    Elevated bilirubin    Common bile duct (CBD) obstruction (HCC)    Calculus of gallbladder with acute  cholecystitis and obstruction    Choledocholithiasis    Thrombocytopenia (HCC)       POD 3 laparoscopic cholecystectomy  Postoperative bile leak s/p ERCP, MRCP  Concern for possible CBD injury    Plan:  Noted Plan for OR Monday with Dr. Bernardo per surgical oncology note  Okay for low fat diet per surgical oncology recommendations  Continue drain care  Analgesics and antiemetics as needed  Encourage ambulation and up to chair  Encourage incentive spirometry   DVT prophylaxis with SCDs. Lovenox on hold for thrombocytopenia    Any Shepard PA-C  6/29/2024  8:37 AM

## 2024-06-29 NOTE — PLAN OF CARE
Pt Aox4. VSS. RA. [place] speaking.  No complain of N/V/D.  Reported abdominal tenderness.   STACIE drain intact. Draining brown/green fluid.   Diet tolerated well.   Up with supervision.  Will continue plan of care.     Problem: GASTROINTESTINAL - ADULT  Goal: Minimal or absence of nausea and vomiting  Description: INTERVENTIONS:  - Maintain adequate hydration with IV or PO as ordered and tolerated  - Nasogastric tube to low intermittent suction as ordered  - Evaluate effectiveness of ordered antiemetic medications  - Provide nonpharmacologic comfort measures as appropriate  - Advance diet as tolerated, if ordered  - Obtain nutritional consult as needed  - Evaluate fluid balance  Outcome: Progressing  Goal: Maintains or returns to baseline bowel function  Description: INTERVENTIONS:  - Assess bowel function  - Maintain adequate hydration with IV or PO as ordered and tolerated  - Evaluate effectiveness of GI medications  - Encourage mobilization and activity  - Obtain nutritional consult as needed  - Establish a toileting routine/schedule  - Consider collaborating with pharmacy to review patient's medication profile  Outcome: Progressing     Problem: METABOLIC/FLUID AND ELECTROLYTES - ADULT  Goal: Hemodynamic stability and optimal renal function maintained  Description: INTERVENTIONS:  - Monitor labs and assess for signs and symptoms of volume excess or deficit  - Monitor intake, output and patient weight  - Monitor urine specific gravity, serum osmolarity and serum sodium as indicated or ordered  - Monitor response to interventions for patient's volume status, including labs, urine output, blood pressure (other measures as available)  - Encourage oral intake as appropriate  - Instruct patient on fluid and nutrition restrictions as appropriate  Outcome: Progressing

## 2024-06-30 LAB
ALBUMIN SERPL-MCNC: 2.4 G/DL (ref 3.4–5)
ALBUMIN/GLOB SERPL: 0.7 {RATIO} (ref 1–2)
ALP LIVER SERPL-CCNC: 100 U/L
ALT SERPL-CCNC: 323 U/L
ANION GAP SERPL CALC-SCNC: 7 MMOL/L (ref 0–18)
AST SERPL-CCNC: 86 U/L (ref 15–37)
BASOPHILS # BLD AUTO: 0.01 X10(3) UL (ref 0–0.2)
BASOPHILS NFR BLD AUTO: 0.1 %
BILIRUB SERPL-MCNC: 1.9 MG/DL (ref 0.1–2)
BUN BLD-MCNC: 7 MG/DL (ref 9–23)
CALCIUM BLD-MCNC: 8 MG/DL (ref 8.5–10.1)
CHLORIDE SERPL-SCNC: 110 MMOL/L (ref 98–112)
CO2 SERPL-SCNC: 23 MMOL/L (ref 21–32)
CREAT BLD-MCNC: 0.43 MG/DL
EGFRCR SERPLBLD CKD-EPI 2021: 124 ML/MIN/1.73M2 (ref 60–?)
EOSINOPHIL # BLD AUTO: 0.17 X10(3) UL (ref 0–0.7)
EOSINOPHIL NFR BLD AUTO: 2.1 %
ERYTHROCYTE [DISTWIDTH] IN BLOOD BY AUTOMATED COUNT: 13.2 %
GLOBULIN PLAS-MCNC: 3.5 G/DL (ref 2.8–4.4)
GLUCOSE BLD-MCNC: 97 MG/DL (ref 70–99)
HCT VFR BLD AUTO: 35.3 %
HGB BLD-MCNC: 12 G/DL
IMM GRANULOCYTES # BLD AUTO: 0.08 X10(3) UL (ref 0–1)
IMM GRANULOCYTES NFR BLD: 1 %
LYMPHOCYTES # BLD AUTO: 1.45 X10(3) UL (ref 1–4)
LYMPHOCYTES NFR BLD AUTO: 17.6 %
MCH RBC QN AUTO: 29.3 PG (ref 26–34)
MCHC RBC AUTO-ENTMCNC: 34 G/DL (ref 31–37)
MCV RBC AUTO: 86.3 FL
MONOCYTES # BLD AUTO: 0.7 X10(3) UL (ref 0.1–1)
MONOCYTES NFR BLD AUTO: 8.5 %
NEUTROPHILS # BLD AUTO: 5.83 X10 (3) UL (ref 1.5–7.7)
NEUTROPHILS # BLD AUTO: 5.83 X10(3) UL (ref 1.5–7.7)
NEUTROPHILS NFR BLD AUTO: 70.7 %
OSMOLALITY SERPL CALC.SUM OF ELEC: 288 MOSM/KG (ref 275–295)
PLATELET # BLD AUTO: 86 10(3)UL (ref 150–450)
PLATELETS.RETICULATED NFR BLD AUTO: 9.8 % (ref 0–7)
POTASSIUM SERPL-SCNC: 3 MMOL/L (ref 3.5–5.1)
POTASSIUM SERPL-SCNC: 4 MMOL/L (ref 3.5–5.1)
PROT SERPL-MCNC: 5.9 G/DL (ref 6.4–8.2)
RBC # BLD AUTO: 4.09 X10(6)UL
SODIUM SERPL-SCNC: 140 MMOL/L (ref 136–145)
WBC # BLD AUTO: 8.2 X10(3) UL (ref 4–11)

## 2024-06-30 PROCEDURE — 99231 SBSQ HOSP IP/OBS SF/LOW 25: CPT | Performed by: HOSPITALIST

## 2024-06-30 RX ORDER — POTASSIUM CHLORIDE 20 MEQ/1
40 TABLET, EXTENDED RELEASE ORAL EVERY 4 HOURS
Status: COMPLETED | OUTPATIENT
Start: 2024-06-30 | End: 2024-06-30

## 2024-06-30 NOTE — PROGRESS NOTES
[facility]  Progress Note    Stephy De Oliveira Patient Status:  Inpatient    1980 MRN [id number]   Location [facility] 4NW-A Attending Moshe López MD   Hosp Day # 5 PCP None Pcp     Subjective:  She is seen and examined resting in bed. A  service was used throughout this encounter. She reports epigastric abdominal pain that is improving with pain medication. She denies nausea or vomiting. She reports passing flatus. She reports no other concerns today.     Objective/Physical Exam:  /66 (BP Location: Left arm)   Pulse 58   Temp 98.3 °F (36.8 °C) (Oral)   Resp 17   Ht 62\"   Wt 135 lb   SpO2 100%   BMI 24.69 kg/m²     Intake/Output Summary (Last 24 hours) at 2024 1600  Last data filed at 2024 1014  Gross per 24 hour   Intake 2652 ml   Output 245 ml   Net 2407 ml         General: Awake, Alert,  Cooperative.  No apparent distress.  Pulm: no respiratory distress, no increased work of breathing  Abdomen:  Soft, non-distended, incisional tenderness to palpation, with no rebound or guarding.  No peritoneal signs.  Incision:  Clean, dry, intact without erythema.    Drains: Huber with bilious drainage    Labs:  Lab Results   Component Value Date    WBC 8.2 2024    HGB 12.0 2024    HCT 35.3 2024    PLT 86.0 2024      Lab Results   Component Value Date    INR 1.05 2024     Lab Results   Component Value Date     2024    K 3.0 2024     2024    CO2 23.0 2024    BUN 7 2024    CREATSERUM 0.43 2024    GLU 97 2024    CA 8.0 2024    ALKPHO 100 2024     2024    AST 86 2024    BILT 1.9 2024    ALB 2.4 2024    TP 5.9 2024            Assessment:  Patient Active Problem List   Diagnosis    Elevated liver enzymes    Elevated bilirubin    Common bile duct (CBD) obstruction (HCC)    Calculus of gallbladder with acute cholecystitis and obstruction     Choledocholithiasis    Thrombocytopenia (HCC)       POD 4 laparoscopic cholecystectomy  Postoperative bile leak s/p ERCP, MRCP  Concern for possible CBD injury     Plan:  Plan for OR Monday with Dr. Bernardo per surgical oncology note  Diet per Surg Onc  Continue drain care  Analgesics and antiemetics as needed  Encourage ambulation and up to chair  Encourage incentive spirometry   DVT prophylaxis with SCDs. Lovenox on hold for thrombocytopenia      Any Shepard PA-C  6/30/2024  4:00 PM

## 2024-06-30 NOTE — PLAN OF CARE
Pt a/o x4. Lao speaking. VSS on RA. No c/o pain. Meds per MAR. IVF per order. STACIE drain dressing changed. I/Os recorded on flowsheets. Pt reports having BM.     Call light within reach.

## 2024-06-30 NOTE — PROGRESS NOTES
[facility]  Progress Note    Stephy De Oliveira Patient Status:  Inpatient    1980 MRN [id number]   Location [facility] 4NW-A Attending Moshe López MD   Hosp Day # 5 PCP None Pcp     Subjective:  No pain, no acute events    Objective/Physical Exam:  General: Alert, orientated x3.  Cooperative.  No apparent distress.  Vital Signs:  Blood pressure 111/71, pulse 65, temperature 98.2 °F (36.8 °C), temperature source Oral, resp. rate 16, height 1.575 m (5' 2\"), weight 61.2 kg (135 lb), SpO2 99%.  Wt Readings from Last 3 Encounters:   24 61.2 kg (135 lb)   24 61.2 kg (134 lb 14.7 oz)     Lungs: No respiratory distress.  Cardiac: Regular rate and rhythm.   Abdomen:  Soft, non distended, non tender, with no rebound or guarding.  No peritoneal signs.   Extremities:  No lower extremity edema noted.    Incision: Clean, dry, intact, no erythema  Drain: With bilious output noted    Intake/Output:    Intake/Output Summary (Last 24 hours) at 2024 0933  Last data filed at 2024 0537  Gross per 24 hour   Intake 2652 ml   Output 290 ml   Net 2362 ml     I/O last 3 completed shifts:  In: 4335 [P.O.:400; I.V.:3935]  Out: 485 [Drains:485]  No intake/output data recorded.    Medications:    potassium chloride  40 mEq Oral Q4H    docusate sodium  100 mg Oral BID    [Held by provider] enoxaparin  40 mg Subcutaneous Daily    pantoprazole  20 mg Oral QAM AC    piperacillin-tazobactam  3.375 g Intravenous Q8H       Labs:  Lab Results   Component Value Date    WBC 8.2 2024    HGB 12.0 2024    HCT 35.3 2024    PLT 86.0 2024     Lab Results   Component Value Date     2024    K 3.0 2024     2024    CO2 23.0 2024    BUN 7 2024    CREATSERUM 0.43 2024    GLU 97 2024    CA 8.0 2024    ALKPHO 100 2024     2024    AST 86 2024    BILT 1.9 2024    ALB 2.4 2024    TP 5.9 2024     Lab  Results   Component Value Date    INR 1.05 06/28/2024         Assessment  Patient Active Problem List   Diagnosis    Elevated liver enzymes    Elevated bilirubin    Common bile duct (CBD) obstruction (HCC)    Calculus of gallbladder with acute cholecystitis and obstruction    Choledocholithiasis    Thrombocytopenia (HCC)     The patient is status post laparoscopic cholecystectomy with bilious output in her drain, indicating possible common duct injury.      Plan:  Tentatively plan for exploration, possible Alejandra-en-Y reconstruction/hepaticojejunostomy  N.p.o. at midnight  May have meds    Zander Piedra MD  Complex General Surgical Oncology  [facility]  [email or web address]

## 2024-06-30 NOTE — PROGRESS NOTES
[facility]   part of [facility]     Hospitalist Progress Note     Stephy De Oliveira Patient Status:  Observation    1980 MRN [id number]   Location [facility] 4NW-A Attending Rush Mendez MD   Hosp Day # 5 PCP None Pcp     Chief Complaint: Abdominal pain    Subjective:   Patient asleep after analgesics.     Current medications:   potassium chloride  40 mEq Oral Q4H    docusate sodium  100 mg Oral BID    [Held by provider] enoxaparin  40 mg Subcutaneous Daily    pantoprazole  20 mg Oral QAM AC    piperacillin-tazobactam  3.375 g Intravenous Q8H       Objective:    Review of Systems:   Limited d/t patient factors    Vital signs:  Temp:  [98 °F (36.7 °C)-98.3 °F (36.8 °C)] 98.3 °F (36.8 °C)  Pulse:  [58-72] 58  Resp:  [13-20] 17  BP: (104-111)/(66-72) 104/66  SpO2:  [92 %-100 %] 100 %  Patient Weight for the past 72 hrs:   Weight   24 1949 135 lb (61.2 kg)     Physical Exam:    General: No acute distress.   Respiratory: Clear to auscultation bilaterally.   Cardiovascular: S1, S2. Regular rate and rhythm.   Abdomen: Soft BS+  Extremities: No edema.    Diagnostic Data:    Labs:  Recent Labs   Lab 24  0635 24  0611 24  0644 24  1402 24  0732 24  0624   WBC 7.3 15.5* 9.4  --  8.7 8.2   HGB 13.1 12.5 12.2  --  11.7* 12.0   MCV 85.9 87.1 87.0  --  85.2 86.3   PLT 99.0* 85.0* 67.0*  --  77.0* 86.0*   INR  --   --   --  1.05  --   --        Recent Labs   Lab 24  0644 24  0732 24  0624   GLU 82 90 97   BUN 7* 7* 7*   CREATSERUM 0.37* 0.56 0.43*   CA 7.9* 8.0* 8.0*   ALB 2.3* 2.4* 2.4*    136 140   K 3.6 3.6 3.0*    107 110   CO2 24.0 23.0 23.0   ALKPHO 101* 97 100*   * 118* 86*   * 389* 323*   BILT 3.1* 2.2* 1.9   TP 6.0* 6.0* 5.9*       Estimated Creatinine Clearance: 134.8 mL/min (A) (based on SCr of 0.43 mg/dL (L)).    Recent Labs   Lab 24  1402   PTP 13.7   INR 1.05            COVID-19 Lab  Results    COVID-19  No results found for: \"COVID19\"    Pro-Calcitonin  No results for input(s): \"PCT\" in the last 168 hours.    Cardiac  No results for input(s): \"TROP\", \"PBNP\" in the last 168 hours.    Creatinine Kinase  No results for input(s): \"CK\" in the last 168 hours.    Inflammatory Markers  No results for input(s): \"CRP\", \"DENIS\", \"LDH\", \"DDIMER\" in the last 168 hours.    No results for input(s): \"TROP\", \"TROPHS\", \"CK\" in the last 168 hours.    Imaging: Imaging data reviewed in Epic.    Medications:    potassium chloride  40 mEq Oral Q4H    docusate sodium  100 mg Oral BID    [Held by provider] enoxaparin  40 mg Subcutaneous Daily    pantoprazole  20 mg Oral QAM AC    piperacillin-tazobactam  3.375 g Intravenous Q8H       Assessment & Plan:    Abdominal pain with obstructive jaundice d/t choledocholithiasis sp EUS/ERCP with biliary sphincterotomy, balloon stone extraction and PD stent placement 6/26 sp lap khalif with IOC 6/26, biliary leak 6/27 sp ERCP which noted biliary leak from cystic duct, but unable to have biliary stent placed d/t mid common bile duct obstruction possibly d/t CBD clip  Diet per surgery, NPO after midnight   IVF  IV abx  Pain control  KUB (PD stent) expected ~7/2/2024  OR tomorrow   GI, General surgery & SurgOnc consult   Thrombocytopenia, sepsis/meds/consumptive/HIT, HHIPA neg, counts improving   Monitor counts  Hematology consult     Supplementary Documentation:   Quality:  DVT Prophylaxis: Lovenox - hold     At this point Ms. De Oliveira is expected to be discharge to: Home    Plan of care discussed with RN.    Moshe López MD

## 2024-07-01 ENCOUNTER — ANESTHESIA EVENT (OUTPATIENT)
Dept: SURGERY | Facility: HOSPITAL | Age: 44
DRG: 409 | End: 2024-07-01
Payer: COMMERCIAL

## 2024-07-01 LAB
ALBUMIN SERPL-MCNC: 2.4 G/DL (ref 3.4–5)
ALBUMIN/GLOB SERPL: 0.6 {RATIO} (ref 1–2)
ALP LIVER SERPL-CCNC: 122 U/L
ALT SERPL-CCNC: 333 U/L
ANION GAP SERPL CALC-SCNC: 6 MMOL/L (ref 0–18)
ANTIBODY SCREEN: NEGATIVE
AST SERPL-CCNC: 96 U/L (ref 15–37)
BILIRUB SERPL-MCNC: 2.8 MG/DL (ref 0.1–2)
BUN BLD-MCNC: 3 MG/DL (ref 9–23)
CALCIUM BLD-MCNC: 8.4 MG/DL (ref 8.5–10.1)
CHLORIDE SERPL-SCNC: 104 MMOL/L (ref 98–112)
CO2 SERPL-SCNC: 24 MMOL/L (ref 21–32)
CREAT BLD-MCNC: 0.46 MG/DL
EGFRCR SERPLBLD CKD-EPI 2021: 122 ML/MIN/1.73M2 (ref 60–?)
ERYTHROCYTE [DISTWIDTH] IN BLOOD BY AUTOMATED COUNT: 13.3 %
GLOBULIN PLAS-MCNC: 4 G/DL (ref 2.8–4.4)
GLUCOSE BLD-MCNC: 111 MG/DL (ref 70–99)
HCT VFR BLD AUTO: 38.6 %
HGB BLD-MCNC: 13 G/DL
MCH RBC QN AUTO: 29.5 PG (ref 26–34)
MCHC RBC AUTO-ENTMCNC: 33.7 G/DL (ref 31–37)
MCV RBC AUTO: 87.7 FL
OSMOLALITY SERPL CALC.SUM OF ELEC: 275 MOSM/KG (ref 275–295)
PLATELET # BLD AUTO: 89 10(3)UL (ref 150–450)
POTASSIUM SERPL-SCNC: 3.8 MMOL/L (ref 3.5–5.1)
PROT SERPL-MCNC: 6.4 G/DL (ref 6.4–8.2)
RBC # BLD AUTO: 4.4 X10(6)UL
RH BLOOD TYPE: POSITIVE
SODIUM SERPL-SCNC: 134 MMOL/L (ref 136–145)
WBC # BLD AUTO: 11.4 X10(3) UL (ref 4–11)

## 2024-07-01 PROCEDURE — 3008F BODY MASS INDEX DOCD: CPT | Performed by: HOSPITALIST

## 2024-07-01 PROCEDURE — 3078F DIAST BP <80 MM HG: CPT | Performed by: HOSPITALIST

## 2024-07-01 PROCEDURE — 99231 SBSQ HOSP IP/OBS SF/LOW 25: CPT | Performed by: HOSPITALIST

## 2024-07-01 PROCEDURE — 3074F SYST BP LT 130 MM HG: CPT | Performed by: HOSPITALIST

## 2024-07-01 NOTE — PROGRESS NOTES
[facility]  Progress Note    Stephy De Oliveira Patient Status:  Inpatient    1980 MRN [id number]   Location [facility] 4NW-A Attending Moshe López MD   Hosp Day # 6 PCP None Pcp     Subjective:  The patient was seen and examined at bedside. No acute events overnight. She denies abdominal pain. She tolerated a diet over the weekend. She denies nausea or vomiting. She is passing flatus and had a bowel movement yesterday.     Objective/Physical Exam:  BP 98/56 (BP Location: Right arm)   Pulse 84   Temp 98.5 °F (36.9 °C) (Oral)   Resp 20   Ht 62\"   Wt 135 lb (61.2 kg)   SpO2 99%   BMI 24.69 kg/m²     Intake/Output Summary (Last 24 hours) at 2024 0904  Last data filed at 2024 0443  Gross per 24 hour   Intake 2959 ml   Output 110 ml   Net 2849 ml         General: Alert, oriented x3. No acute distress.  HEENT: Normocephalic, atraumatic. No scleral icterus.  Pulmonary: No respiratory distress, effort normal.   Abdomen: Non-distended, without tympany to percussion. Soft, non-tender to palpation. No rebound or guarding. No peritoneal signs.   Incision: clean, dry, intact without surrounding erythema or cellulitis  Extremities: No lower extremity edema. No clubbing or cyanosis.   Skin: Warm, dry. No jaundice.   Drain: bilious output in the drain tubing. STACIE bulb with a mix of ss and bilious output      Labs:      Lab Results   Component Value Date    INR 1.05 2024     Lab Results   Component Value Date     2024    K 3.8 2024     2024    CO2 24.0 2024    BUN 3 2024    CREATSERUM 0.46 2024     2024    CA 8.4 2024    ALKPHO 122 2024     2024    AST 96 2024    BILT 2.8 2024    ALB 2.4 2024    TP 6.4 2024          Assessment:  Patient Active Problem List   Diagnosis    Elevated liver enzymes    Elevated bilirubin    Common bile duct (CBD) obstruction (HCC)    Calculus of gallbladder  with acute cholecystitis and obstruction    Choledocholithiasis    Thrombocytopenia (HCC)     POD 5 laparoscopic cholecystectomy with intraoperative cholangiogram, preoperative ERCP for choledocholithiasis  Postoperative bile leak s/p ERCP, MRCP  Concern for possible CBD injury    Plan:  Plan for OR tomorrow with Dr. Bernardo for exploration, possible eze-en-y reconstruction/hepaticojejunostomy   The patient may have a low fat diet today  Npo at midnight  Antiemetics and analgesics as needed  Encourage ambulation and up to chair  Encourage incentive spirometer  DVT prophylaxis with SCDs-lovenox on hold for surgery tomorrow  GI prophylaxis with protonix       My total face time with this patient was 25 minutes. Greater than half of the visit was spent in counseling the patient on the above listed diagnoses and treatment options.     Rebekah Santiago PA-C  7/1/2024  9:04 AM    Stephy is being interviewed today using  ID number [id number]  Patient is POD 5 status post laparoscopic cholecystectomy with intraoperative cholangiogram, preoperative ERCP for choledocholithiasis  Stephy's son is at the bedside  Stephy denies any abdominal pain, nausea or vomiting.  Patient does tolerating a soft diet without symptoms.  LFTs today reveal alkaline phosphatase 122, yesterday 100.  SGOT 96, yesterday 86, SGPT 333 yesterday 323.  Total bilirubin mildly elevated from yesterday 1.9, today 2.8.  Nicko drain output 110 cc over past 24 hours.  The Nicko drain output is bile tinged with serosanguineous portion.  It is not eva bile.  Abdominal examination-abdomen is soft, nondistended, minimal tenderness near the Nicko drain, right upper quadrant.  Surgical incisions are clean and dry.  Case reviewed with Dr. Bernardo.   Would expect total bilirubin to be much higher with complete CBD injury on postop day 5.  Despite postoperative imaging including ERCP, MRI, CT, HIDA scan anatomy is somewhat unclear.  Per ERCP, bile leak  appears to be from cystic duct remnant however choledocho scope could not be passed proximally raising suspicion for CBD injury.  Will proceed with PTCA today in an attempt to define anatomy further prior to surgery scheduled for tomorrow.  Dr. Bernardo in agreement that this may be useful preoperatively.  Discussed with Thi the rationale for PTCA today.    I did offer to call her  while Georgian  was on the line however she stated that she had no further questions and did not wish for her  to be called.  Again, she has no further questions at this time and will proceed as discussed.    I agree with the note above and attest to its accuracy with the following changes below after my interview and examination of the patient    The patient was seen and examined.  Available labs and radiology is noted.    Cheyanne Pereyra MD FACS    Please note that this report has been produced using speech recognition software and may contain errors related to that system including but not limited to errors in grammar, punctuation and spelling as well as words and phrases that possibly may have been recognized inappropriately.  If there are any questions or concerns please contact the dictating provider for clarification.    The 21st Century Cures Act makes medical notes like these available to patients in the interest of trans parency. Please be advised this is a medical document. Medical documents are intended to carry relevant information, facts as evident, and the clinical opinion of the practitioner. The medical note is intended as peer to peer communication and may appear blunt or direct. It is written in medical language and may contain abbreviations or verbiage that are unfamiliar.   Again if there are any questions or concerns please contact the dictating provider for clarification.

## 2024-07-01 NOTE — PAYOR COMM NOTE
7/1   CONTINUED STAY REVIEW    Payor: [employer] OUT OF STATE POS/MCNP  Subscriber #:  [id number]  Authorization Number: [id number]    Admit date: 6/25/24  Admit time:  2:12 PM  MEDICATIONS ADMINISTERED IN LAST 1 DAY:  docusate sodium (Colace) cap 100 mg       Date Action Dose Route User    7/1/2024 1041 Given 100 mg Oral Ava Hardy RN    6/30/2024 2007 Given 100 mg Oral Valentina Edwards RN          HYDROmorphone (Dilaudid) 1 MG/ML injection 0.2 mg       Date Action Dose Route User    6/30/2024 1609 Given 0.2 mg Intravenous Valerie Escalera RN          oxyCODONE immediate release tab 5 mg       Date Action Dose Route User    6/30/2024 1920 Given 5 mg Oral Valerie Escalera RN          pantoprazole (Protonix) DR tab 20 mg       Date Action Dose Route User    7/1/2024 0610 Given 20 mg Oral Valentina Edwards RN          piperacillin-tazobactam (Zosyn) 3.375 g in dextrose 5% 100 mL IVPB-ADDV       Date Action Dose Route User    7/1/2024 0610 New Bag 3.375 g Intravenous Valentina Edwards RN    6/30/2024 2226 New Bag 3.375 g Intravenous Valentina Edwards RN    6/30/2024 1609 New Bag 3.375 g Intravenous Valerie Escalera RN          potassium chloride (Klor-Con M20) tab 40 mEq       Date Action Dose Route User    6/30/2024 1742 Given 40 mEq Oral Valerie Escalera RN          sodium chloride 0.9% infusion       Date Action Dose Route User    7/1/2024 1253 New Bag (none) Intravenous Ava Hardy RN    7/1/2024 0200 New Bag (none) Intravenous Valentina Edwards RN    6/30/2024 1747 New Bag (none) Intravenous Valerie Escalera RN            Vitals (last day)       Date/Time Temp Pulse Resp BP SpO2 Weight O2 Device O2 Flow Rate (L/min) [facility]    07/01/24 1517 98.2 °F (36.8 °C) 95 25 106/64 100 % -- None (Room air) --     07/01/24 1058 98.7 °F (37.1 °C) 86 22 107/66 100 % -- None (Room air) --     07/01/24 0835 98.5 °F (36.9 °C) 84 20 98/56 99 % -- None (Room air) --     07/01/24 0431 99.4 °F (37.4 °C) 87 20 113/75 99 %  -- None (Room air) -- EK    07/01/24 0040 99.1 °F (37.3 °C) -- -- -- -- -- -- -- JM    07/01/24 0035 99.1 °F (37.3 °C) 91 20 109/71 100 % -- None (Room air) -- JM    06/30/24 2124 97.8 °F (36.6 °C) 83 20 123/72 100 % -- None (Room air) -- EK    06/30/24 1520 98.1 °F (36.7 °C) 69 21 125/79 100 % -- None (Room air) -- ES    06/30/24 1100 98.3 °F (36.8 °C) 58 17 104/66 100 % -- None (Room air) -- ES    06/30/24 0542 98.2 °F (36.8 °C) 65 16 111/71 99 % -- None (Room air) -- AM    06/30/24 0035 98.2 °F (36.8 °C) 62 20 106/69 100 % -- None (Room air) -- EK         7/1 HOSPITALIST NOTE         Subjective:  Patient with abdominal pain yesterday. Denies abdominal pain, nausea or vomiting today. On room air. Family at bedside.     Current medications:  Scheduled Medications    docusate sodium  100 mg Oral BID    [Held by provider] enoxaparin  40 mg Subcutaneous Daily    pantoprazole  20 mg Oral QAM AC    piperacillin-tazobactam  3.375 g Intravenous Q8H                  Objective:  Review of Systems:   10 system review completed.     Vital signs:  Temp:  [97.8 °F (36.6 °C)-99.4 °F (37.4 °C)] 99.4 °F (37.4 °C)  Pulse:  [58-91] 87  Resp:  [17-21] 20  BP: (104-125)/(66-79) 113/75  SpO2:  [99 %-100 %] 99 %  Patient Weight for the past 72 hrs:    Weight   06/24/24 1949 135 lb (61.2 kg)     Recent Labs   Lab 06/26/24  0635 06/27/24  0611 06/28/24  0644 06/28/24  1402 06/29/24  0732 06/30/24  0624   WBC 7.3 15.5* 9.4  --  8.7 8.2   HGB 13.1 12.5 12.2  --  11.7* 12.0   MCV 85.9 87.1 87.0  --  85.2 86.3   PLT 99.0* 85.0* 67.0*  --  77.0* 86.0*   INR  --   --   --  1.05  --   --                 Recent Labs   Lab 06/29/24  0732 06/30/24  0624 06/30/24  2135 07/01/24  0720   GLU 90 97  --  111*   BUN 7* 7*  --  3*   CREATSERUM 0.56 0.43*  --  0.46*   CA 8.0* 8.0*  --  8.4*   ALB 2.4* 2.4*  --  2.4*    140  --  134*   K 3.6 3.0* 4.0 3.8    110  --  104   CO2 23.0 23.0  --  24.0   ALKPHO 97 100*  --  122*   * 86*  --  96*    * 323*  --  333*   BILT 2.2* 1.9  --  2.8*   TP 6.0* 5.9*  --  6.4          Assessment & Plan:  Abdominal pain with obstructive jaundice d/t choledocholithiasis sp EUS/ERCP with biliary sphincterotomy, balloon stone extraction and PD stent placement 6/26 sp lap khalif with IOC 6/26, biliary leak 6/27 sp ERCP which noted biliary leak from cystic duct, but unable to have biliary stent placed d/t mid common bile duct obstruction possibly d/t CBD clip  NPO  IVF  IV abx  Pain control  KUB (PD stent) expected ~7/2/2024  OR today   GI, General surgery & SurgOnc consult   Thrombocytopenia, sepsis/meds/consumptive/HIT, HHIPA neg, counts improving   Monitor counts  Hematology consult     7/1 GEN SURGERY NOTE    Subjective:  The patient was seen and examined at bedside. No acute events overnight. She denies abdominal pain. She tolerated a diet over the weekend. She denies nausea or vomiting. She is passing flatus and had a bowel movement yesterday.      Objective/Physical Exam:  BP 98/56 (BP Location: Right arm)   Pulse 84   Temp 98.5 °F (36.9 °C) (Oral)   Resp 20   Ht 62\"   Wt 135 lb (61.2 kg)   SpO2 99%   BMI 24.69 kg/m²      Intake/Output Summary (Last 24 hours) at 7/1/2024 0904  Last data filed at 7/1/2024 0443      Gross per 24 hour   Intake 2959 ml   Output 110 ml   Net 2849 ml       Lab Results   Component Value Date      07/01/2024     K 3.8 07/01/2024      07/01/2024     CO2 24.0 07/01/2024     BUN 3 07/01/2024     CREATSERUM 0.46 07/01/2024      07/01/2024     CA 8.4 07/01/2024     ALKPHO 122 07/01/2024      07/01/2024     AST 96 07/01/2024     BILT 2.8 07/01/2024     ALB 2.4 07/01/2024     TP 6.4 07/01/2024         Assessment:      Patient Active Problem List   Diagnosis    Elevated liver enzymes    Elevated bilirubin    Common bile duct (CBD) obstruction (HCC)    Calculus of gallbladder with acute cholecystitis and obstruction    Choledocholithiasis    Thrombocytopenia (HCC)       POD 5 laparoscopic cholecystectomy with intraoperative cholangiogram, preoperative ERCP for choledocholithiasis  Postoperative bile leak s/p ERCP, MRCP  Concern for possible CBD injury     Plan:  Plan for OR tomorrow with Dr. Bernardo for exploration, possible eze-en-y reconstruction/hepaticojejunostomy   The patient may have a low fat diet today  Npo at midnight  Antiemetics and analgesics as needed  Encourage ambulation and up to chair  Encourage incentive spirometer  DVT prophylaxis with SCDs-lovenox on hold for surgery tomorrow  GI prophylaxis with protonix     7/1 SURGICAL ONCOLOGY  NOTE  Patient doing well this morning. She has no nausea or vomiting. Denies constipation or diarrhea.      Blood pressure 98/56, pulse 84, temperature 98.5 °F (36.9 °C), temperature source Oral, resp. rate 20, height 1.575 m (5' 2\"), weight 61.2 kg (135 lb), SpO2 99%.     Intake/Output Summary (Last 24 hours) at 7/1/2024 1001  Last data filed at 7/1/2024 0443      Gross per 24 hour   Intake 2959 ml   Output 110 ml   Net 2849 ml            Lab Results   Component Value Date     CREATSERUM 0.46 07/01/2024     BUN 3 07/01/2024      07/01/2024     K 3.8 07/01/2024      07/01/2024     CO2 24.0 07/01/2024      07/01/2024     CA 8.4 07/01/2024     ALB 2.4 07/01/2024     ALKPHO 122 07/01/2024     BILT 2.8 07/01/2024     TP 6.4 07/01/2024     AST 96 07/01/2024      07/01/2024      Constitutional:  NAD.   Eyes: non-icteric.    Abdomen: Soft, mild ride sided tenderness, non-distended. Right abdominal drain with SS, bile tinged drainage.   Musculoskeletal: no edema.     - Plan to take patient to surgery tomorrow  - Surgical plan to include exploratory laparotomy with possible hepaticojejunostomy. Patient expressed understanding. Reviewed reasoning, rationale and risks of surgery. She expressed understanding. No additional questions.   - OK for low fat diet today. NPO at midnight.   - Continue GI prophylaxis  -  Lovenox on hold currently  - Continue current pain regimen and antiemetics PRN  - Encourage ambulation and IS

## 2024-07-01 NOTE — PROGRESS NOTES
[place] interpretor used during this encounter. Patient doing well this morning. She has no nausea or vomiting. Denies constipation or diarrhea.     Blood pressure 98/56, pulse 84, temperature 98.5 °F (36.9 °C), temperature source Oral, resp. rate 20, height 1.575 m (5' 2\"), weight 61.2 kg (135 lb), SpO2 99%.    Intake/Output Summary (Last 24 hours) at 7/1/2024 1001  Last data filed at 7/1/2024 0443  Gross per 24 hour   Intake 2959 ml   Output 110 ml   Net 2849 ml     Lab Results   Component Value Date    CREATSERUM 0.46 07/01/2024    BUN 3 07/01/2024     07/01/2024    K 3.8 07/01/2024     07/01/2024    CO2 24.0 07/01/2024     07/01/2024    CA 8.4 07/01/2024    ALB 2.4 07/01/2024    ALKPHO 122 07/01/2024    BILT 2.8 07/01/2024    TP 6.4 07/01/2024    AST 96 07/01/2024     07/01/2024     Constitutional:  NAD.   Eyes: non-icteric.    Abdomen: Soft, mild ride sided tenderness, non-distended. Right abdominal drain with SS, bile tinged drainage.   Musculoskeletal: no edema.    - Plan to take patient to surgery tomorrow  - Surgical plan to include exploratory laparotomy with possible hepaticojejunostomy. Patient expressed understanding. Reviewed reasoning, rationale and risks of surgery. She expressed understanding. No additional questions.   - OK for low fat diet today. NPO at midnight.   - Continue GI prophylaxis  - Lovenox on hold currently  - Continue current pain regimen and antiemetics PRN  - Encourage ambulation and IS    DW FLAVIA Nicholson  Attending:  I have reviewed the above listed note and evaluation by the physician assistant Sarah Reese. I have personally seen and examined the patient with her and I agree with the above listed assessment and plan which I formulated.    Care discussed as above    BRITTANY MC MD

## 2024-07-01 NOTE — PROGRESS NOTES
[facility]   part of [facility]     Hospitalist Progress Note     Stephy De Oliveira Patient Status:  Observation    1980 MRN [id number]   Location [facility] 4NW-A Attending Rush Mendez MD   Hosp Day # 6 PCP None Pcp     Chief Complaint: Abdominal pain    Subjective:   Patient with abdominal pain yesterday. Denies abdominal pain, nausea or vomiting today. On room air. Family at bedside.    Current medications:   docusate sodium  100 mg Oral BID    [Held by provider] enoxaparin  40 mg Subcutaneous Daily    pantoprazole  20 mg Oral QAM AC    piperacillin-tazobactam  3.375 g Intravenous Q8H       Objective:    Review of Systems:   10 system review completed.    Vital signs:  Temp:  [97.8 °F (36.6 °C)-99.4 °F (37.4 °C)] 99.4 °F (37.4 °C)  Pulse:  [58-91] 87  Resp:  [17-21] 20  BP: (104-125)/(66-79) 113/75  SpO2:  [99 %-100 %] 99 %  Patient Weight for the past 72 hrs:   Weight   24 1949 135 lb (61.2 kg)     Physical Exam:    General: No acute distress.   Respiratory: Clear to auscultation bilaterally.   Cardiovascular: S1, S2. Regular rate and rhythm.   Abdomen: Soft BS+ tender STACIE with bloody/bilious output  Extremities: No edema.    Diagnostic Data:    Labs:  Recent Labs   Lab 24  0635 24  0611 24  0644 24  1402 24  0732 24  0624   WBC 7.3 15.5* 9.4  --  8.7 8.2   HGB 13.1 12.5 12.2  --  11.7* 12.0   MCV 85.9 87.1 87.0  --  85.2 86.3   PLT 99.0* 85.0* 67.0*  --  77.0* 86.0*   INR  --   --   --  1.05  --   --        Recent Labs   Lab 24  0732 24  0624 24  2135 24  0720   GLU 90 97  --  111*   BUN 7* 7*  --  3*   CREATSERUM 0.56 0.43*  --  0.46*   CA 8.0* 8.0*  --  8.4*   ALB 2.4* 2.4*  --  2.4*    140  --  134*   K 3.6 3.0* 4.0 3.8    110  --  104   CO2 23.0 23.0  --  24.0   ALKPHO 97 100*  --  122*   * 86*  --  96*   * 323*  --  333*   BILT 2.2* 1.9  --  2.8*   TP 6.0* 5.9*  --  6.4       Estimated  Creatinine Clearance: 126 mL/min (A) (based on SCr of 0.46 mg/dL (L)).    Recent Labs   Lab 06/28/24  1402   PTP 13.7   INR 1.05            COVID-19 Lab Results    COVID-19  No results found for: \"COVID19\"    Pro-Calcitonin  No results for input(s): \"PCT\" in the last 168 hours.    Cardiac  No results for input(s): \"TROP\", \"PBNP\" in the last 168 hours.    Creatinine Kinase  No results for input(s): \"CK\" in the last 168 hours.    Inflammatory Markers  No results for input(s): \"CRP\", \"DENIS\", \"LDH\", \"DDIMER\" in the last 168 hours.    No results for input(s): \"TROP\", \"TROPHS\", \"CK\" in the last 168 hours.    Imaging: Imaging data reviewed in Epic.    Medications:    docusate sodium  100 mg Oral BID    [Held by provider] enoxaparin  40 mg Subcutaneous Daily    pantoprazole  20 mg Oral QAM AC    piperacillin-tazobactam  3.375 g Intravenous Q8H       Assessment & Plan:    Abdominal pain with obstructive jaundice d/t choledocholithiasis sp EUS/ERCP with biliary sphincterotomy, balloon stone extraction and PD stent placement 6/26 sp lap khalif with IOC 6/26, biliary leak 6/27 sp ERCP which noted biliary leak from cystic duct, but unable to have biliary stent placed d/t mid common bile duct obstruction possibly d/t CBD clip  NPO  IVF  IV abx  Pain control  KUB (PD stent) expected ~7/2/2024  OR today   GI, General surgery & SurgOnc consult   Thrombocytopenia, sepsis/meds/consumptive/HIT, HHIPA neg, counts improving   Monitor counts  Hematology consult     Supplementary Documentation:   Quality:  DVT Prophylaxis: Lovenox - hold     At this point Ms. De Oliveira is expected to be discharge to: Home    Plan of care discussed with patient, family and RN,    Moshe López MD

## 2024-07-01 NOTE — ANESTHESIA PREPROCEDURE EVALUATION
PRE-OP EVALUATION    Patient Name: Stephy De Oliveira    Admit Diagnosis: Elevated liver enzymes [R74.8]  Elevated bilirubin [R17]    Pre-op Diagnosis: BILE LEAK    EXPLORATORY LAPAROTOMY, HEPATICOJEJUNOSTOMY    Anesthesia Procedure: EXPLORATORY LAPAROTOMY, HEPATICOJEJUNOSTOMY    Surgeons and Role:     * Jian Bernardo MD - Primary    Pre-op vitals reviewed.  Temp: 98.7 °F (37.1 °C)  Pulse: 86  Resp: 22  BP: 107/66  SpO2: 100 %  Body mass index is 24.69 kg/m².  Hospital Medications:   [COMPLETED] potassium chloride (Klor-Con M20) tab 40 mEq  40 mEq Oral Q4H    docusate sodium (Colace) cap 100 mg  100 mg Oral BID    polyethylene glycol (PEG 3350) (Miralax) 17 g oral packet 17 g  17 g Oral Daily PRN    [COMPLETED] gadoterate meglumine (Dotarem) 7.5 MMOL/15ML injection 15 mL  15 mL Intravenous ONCE PRN    HYDROmorphone (Dilaudid) 1 MG/ML injection 0.1 mg  0.1 mg Intravenous Q2H PRN    Or    HYDROmorphone (Dilaudid) 1 MG/ML injection 0.2 mg  0.2 mg Intravenous Q2H PRN    Or    HYDROmorphone (Dilaudid) 1 MG/ML injection 0.4 mg  0.4 mg Intravenous Q2H PRN    [Held by provider] enoxaparin (Lovenox) 40 MG/0.4ML SUBQ injection 40 mg  40 mg Subcutaneous Daily    [COMPLETED] magnesium sulfate in sterile water for injection 2 g/50mL IVPB premix 2 g  2 g Intravenous Once    oxyCODONE immediate release tab 5 mg  5 mg Oral Q4H PRN    [COMPLETED] sodium chloride 0.9 % IV bolus 1,000 mL  1,000 mL Intravenous Once    [COMPLETED] iopamidol 76% (ISOVUE-370) injection for power injector  80 mL Intravenous ONCE PRN    pantoprazole (Protonix) DR tab 20 mg  20 mg Oral QAM AC    sodium chloride 0.9% infusion   Intravenous Continuous    ondansetron (Zofran) 4 MG/2ML injection 4 mg  4 mg Intravenous Q6H PRN    prochlorperazine (Compazine) 10 MG/2ML injection 5 mg  5 mg Intravenous Q8H PRN    piperacillin-tazobactam (Zosyn) 3.375 g in dextrose 5% 100 mL IVPB-ADDV  3.375 g Intravenous Q8H    [COMPLETED] indocyanine green (IC-Green) injection 5  mg  5 mg Intravenous Once    [] lactated ringers IV bolus 500 mL  500 mL Intravenous Once PRN    [] atropine 0.1 MG/ML injection 0.5 mg  0.5 mg Intravenous PRN    [] naloxone (Narcan) 0.4 MG/ML injection 0.08 mg  0.08 mg Intravenous PRN    [] fentaNYL (Sublimaze) 50 mcg/mL injection 25 mcg  25 mcg Intravenous Q5 Min PRN    Or    [] fentaNYL (Sublimaze) 50 mcg/mL injection 50 mcg  50 mcg Intravenous Q5 Min PRN    [] HYDROmorphone (Dilaudid) 1 MG/ML injection 0.2 mg  0.2 mg Intravenous Q5 Min PRN    Or    [] HYDROmorphone (Dilaudid) 1 MG/ML injection 0.4 mg  0.4 mg Intravenous Q5 Min PRN    Or    [] HYDROmorphone (Dilaudid) 1 MG/ML injection 0.6 mg  0.6 mg Intravenous Q5 Min PRN    [] indomethacin (Indocin) 100 MG rectal suppository        [COMPLETED] ondansetron (Zofran) 4 MG/2ML injection 4 mg  4 mg Intravenous Once       Outpatient Medications:     Medications Prior to Admission   Medication Sig Dispense Refill Last Dose    pantoprazole 20 MG Oral Tab EC Take 1 tablet (20 mg total) by mouth every morning before breakfast.   2024       Allergies: Advil [ibuprofen]      Anesthesia Evaluation    Patient summary reviewed.    Anesthetic Complications  (-) history of anesthetic complications         GI/Hepatic/Renal  Comment: Abdominal pain with obstructive jaundice d/t choledocholithiasis sp EUS/ERCP with biliary sphincterotomy, balloon stone extraction and PD stent placement  sp lap khalif with IOC , biliary leak  sp ERCP which noted biliary leak from cystic duct, but unable to have biliary stent placed d/t mid common bile duct obstruction possibly d/t CBD clip              (+) liver disease                 Cardiovascular    Negative cardiovascular ROS.  ECG reviewed.  Exercise tolerance: good     MET: >4                                           Endo/Other                       (+) thrombocytopenia           Pulmonary    Negative pulmonary  ROS.                       Neuro/Psych    Negative neuro/psych ROS.                                  History reviewed. No pertinent surgical history.  Social History     Socioeconomic History    Marital status:    Tobacco Use    Smoking status: Never    Smokeless tobacco: Never   Substance and Sexual Activity    Alcohol use: Yes     Comment: socially    Drug use: Never     History   Drug Use Unknown     Available pre-op labs reviewed.  Lab Results   Component Value Date    WBC 11.4 (H) 07/01/2024    RBC 4.40 07/01/2024    HGB 13.0 07/01/2024    HCT 38.6 07/01/2024    MCV 87.7 07/01/2024    MCH 29.5 07/01/2024    MCHC 33.7 07/01/2024    RDW 13.3 07/01/2024    PLT 89.0 (L) 07/01/2024     Lab Results   Component Value Date     (L) 07/01/2024    K 3.8 07/01/2024     07/01/2024    CO2 24.0 07/01/2024    BUN 3 (L) 07/01/2024    CREATSERUM 0.46 (L) 07/01/2024     (H) 07/01/2024    CA 8.4 (L) 07/01/2024     Lab Results   Component Value Date    INR 1.05 06/28/2024         Airway      Mallampati: II  Mouth opening: 3 FB  TM distance: 4 - 6 cm  Neck ROM: full Cardiovascular      Rhythm: regular  Rate: normal     Dental             Pulmonary                     Other findings            ASA: 2   Plan: general  NPO status verified and patient meets guidelines.    Post-procedure pain management plan discussed with surgeon and patient.    Comment: GETA/LMA discussed in detail.  Risk of complications discussed including but not limited to sore throat, cough, PONV discussed. Also, discussed risks including dental injury particularly on any weakened, treated or diseased teeth & pt wishes to proceed  All questions answered.    Plan/risks discussed with: patient and   Use of blood product(s) discussed with: patient and     Consented to blood products.        Present on Admission:  **None**

## 2024-07-01 NOTE — DIETARY NOTE
[facility]   part of [facility]    NUTRITION ASSESSMENT    Pt does not meet malnutrition criteria at this time.    NUTRITION INTERVENTION:    Meal and Snacks - Monitor and encourage adequate PO intake. NPO/Clear Liquid status x 4 days.   Medical Food Supplements - Apple Ensure Clear TID. Rationale/use for oral supplements discussed.  Nutrition Support-if diet is unable to advance to at least Full Liquid diet within 48-72 hours, consider initiating PN, as medically feasible.     PATIENT STATUS:   73/- Diet advanced to low fat tolerated over the weekend per chart, intake ok for today and NPO at midnight. Surgery planned for tomorrow. exploration, possible eze-en-y reconstruction/hepaticojejunostomy +BM 6/30.  Wt is stable. RD to follow post op for po intakes, wts, ons.     6/28/24- 45 y/o female admitted with elevated LFT's, jaundice and abdominal pain (starting 3 days PTA). Pt seen d/t NPO/Clear Liquid status x 4 days. Diet advanced to Clear Liquid. Used video Nepali interpretor via [employer]. Pt stated her appetite was normal PTA. Denied any unintentional wt loss and stated wt has been stable.  Pt found to have obstructive jaundice d/t choledocholithiasis.  -6/25: EUS/EGD, dilation of CBD, ERCP w/ biliary sphincterotomy, balloon stone extraction and PD stent placement.   -6/26:lap cholecystectomy w/ intraoperative cholangiogram and ICG  -6/27: ERCP, found bile leak, unable to place biliary stent  Will monitor for diet advancement vs need for nutrition support.   PMH:ovarian cyst, oophorectomy.     ANTHROPOMETRICS:  Ht: 157.5 cm (5' 2\")  Wt: 61.2 kg (135 lb).   BMI: Body mass index is 24.69 kg/m².  IBW: 50 kg    WEIGHT HISTORY:   Pt denied any unintentional wt loss. Reported usual wt~135 lb.    Wt Readings from Last 10 Encounters:   06/25/24 61.2 kg (135 lb)   06/27/24 61.2 kg (134 lb 14.7 oz)      NUTRITION:  Diet:       Procedures    Low Fiber/Soft diet Low Fiber/Soft, Low Fat; Is Patient on  Accuchecks? No    NPO      Food Allergies: No  Cultural/Ethnic/Church Preferences Addressed: Yes    Percent Meals Eaten (last 3 days)       Date/Time Percent Meals Eaten (%)    06/29/24 1822 100 %    06/30/24 1700 0 %          GI system review: abdominal pain Last BM: 6/30  Skin and wounds: 4 lap sites, STACIE drain    NUTRITION RELATED PHYSICAL FINDINGS:     1. Body Fat/Muscle Mass: no wasting noted     2. Fluid Accumulation: none per RN documentation     NUTRITION PRESCRIPTION: 61.2 kg-134 lb (6/27)  Calories: 8853-5179 calories/day (25-30 kcal/kg)  Protein: 61-73 grams protein/day ( 1.0-1.2  grams protein per kg)  Fluid: ~1 ml/kcal or per MD discretion    NUTRITION DIAGNOSIS/PROBLEM:  Inadequate energy intake related to  decreased ability to consume sufficient energy intakes  as evidenced by suboptimal oral intakes related to NPO  /CLD     MONITOR AND EVALUATE/NUTRITION GOALS:  PO intake of 75% of meals TID - Not met, Continues  PO intake of 75% of oral nutrition supplement/s - Not met, Continues  Weight stable within 1 to 2 lbs during admission - Ongoing  Return to PO intake or advance diet in 24-48 hrs - Ongoing  Start alternative nutrition in 24-48 hrs if diet is not able to advance- Ongoing    MEDICATIONS:  IVF:NS at 100 ml/hr, IV abx, Protonix, colace    LABS:  Glu:111, elevated LFT's    Pt is at High nutrition risk    Ivelisse Peterson RD, LDN  Clinical Dietitian

## 2024-07-01 NOTE — PLAN OF CARE
Pt a/o x4. Luxembourgish speaking. VSS on RA. No reports of pain. Meds per MAR. IVF per order. Pt reminded about NPO status for possible OR. STACIE dressing & lap sites C/D/I. STACIE drain intact. I/Os on flowsheets.     Call light within reach.

## 2024-07-01 NOTE — PLAN OF CARE
Problem: Patient/Family Goals  Goal: Patient/Family Long Term Goal  Description: Patient's Long Term Goal: Discharge  Interventions:  - See additional Care Plan goals for specific interventions  Outcome: Progressing  Goal: Patient/Family Short Term Goal  Description: Patient's Short Term Goal: \"Have surgery go well\"    Interventions:   - NPO @ MN  - Verbalize understanding on surgical procedure  - See additional Care Plan goals for specific interventions  Outcome: Progressing     Problem: GASTROINTESTINAL - ADULT  Goal: Minimal or absence of nausea and vomiting  Description: INTERVENTIONS:  - Maintain adequate hydration with IV or PO as ordered and tolerated  - Nasogastric tube to low intermittent suction as ordered  - Evaluate effectiveness of ordered antiemetic medications  - Provide nonpharmacologic comfort measures as appropriate  - Advance diet as tolerated, if ordered  - Obtain nutritional consult as needed  - Evaluate fluid balance  Outcome: Progressing  Goal: Maintains or returns to baseline bowel function  Description: INTERVENTIONS:  - Assess bowel function  - Maintain adequate hydration with IV or PO as ordered and tolerated  - Evaluate effectiveness of GI medications  - Encourage mobilization and activity  - Obtain nutritional consult as needed  - Establish a toileting routine/schedule  - Consider collaborating with pharmacy to review patient's medication profile  Outcome: Progressing     Problem: METABOLIC/FLUID AND ELECTROLYTES - ADULT  Goal: Hemodynamic stability and optimal renal function maintained  Description: INTERVENTIONS:  - Monitor labs and assess for signs and symptoms of volume excess or deficit  - Monitor intake, output and patient weight  - Monitor urine specific gravity, serum osmolarity and serum sodium as indicated or ordered  - Monitor response to interventions for patient's volume status, including labs, urine output, blood pressure (other measures as available)  - Encourage oral  intake as appropriate  - Instruct patient on fluid and nutrition restrictions as appropriate  Outcome: Progressing     Language line translation used several times during shift to discuss POC, medications, PO/NPO status and to verify that patient is A/O X4. Per IR staff Shakira, IR is unable to perform PTCA today d/t pt having PO intake for lunch. Plan for PTCA tomorrow AM. NPO MN in place. Lovenox on hold, pt educated on importance of ambulating and performing ankle exercisses to aid in the prevention of DVTs. Pt stated pain to RL abd this am, declined medications, denied pain for remainder of shift. Denies nausea. STACIE drain to bulb suction, 100ml output thus far during shift. Patient updated, progressing and in agreement with POC. Safety precautions in place. Charge RN aware of transfer. orders.

## 2024-07-02 ENCOUNTER — APPOINTMENT (OUTPATIENT)
Dept: INTERVENTIONAL RADIOLOGY/VASCULAR | Facility: HOSPITAL | Age: 44
DRG: 409 | End: 2024-07-02
Attending: SURGERY
Payer: COMMERCIAL

## 2024-07-02 ENCOUNTER — ANESTHESIA (OUTPATIENT)
Dept: SURGERY | Facility: HOSPITAL | Age: 44
DRG: 409 | End: 2024-07-02
Payer: COMMERCIAL

## 2024-07-02 LAB
ALBUMIN SERPL-MCNC: 2.1 G/DL (ref 3.4–5)
ALBUMIN/GLOB SERPL: 0.6 {RATIO} (ref 1–2)
ALP LIVER SERPL-CCNC: 108 U/L
ALT SERPL-CCNC: 276 U/L
ANION GAP SERPL CALC-SCNC: 6 MMOL/L (ref 0–18)
AST SERPL-CCNC: 73 U/L (ref 15–37)
BASE EXCESS BLD CALC-SCNC: -3 MMOL/L
BASE EXCESS BLD CALC-SCNC: -6 MMOL/L
BASOPHILS # BLD AUTO: 0.03 X10(3) UL (ref 0–0.2)
BASOPHILS NFR BLD AUTO: 0.4 %
BILIRUB SERPL-MCNC: 1.9 MG/DL (ref 0.1–2)
BUN BLD-MCNC: 6 MG/DL (ref 9–23)
CA-I BLD-SCNC: 1.05 MMOL/L (ref 1.12–1.32)
CA-I BLD-SCNC: 1.11 MMOL/L (ref 1.12–1.32)
CALCIUM BLD-MCNC: 8 MG/DL (ref 8.5–10.1)
CHLORIDE SERPL-SCNC: 111 MMOL/L (ref 98–112)
CO2 BLD-SCNC: 20 MMOL/L (ref 22–32)
CO2 BLD-SCNC: 23 MMOL/L (ref 22–32)
CO2 SERPL-SCNC: 24 MMOL/L (ref 21–32)
CREAT BLD-MCNC: 0.38 MG/DL
EGFRCR SERPLBLD CKD-EPI 2021: 128 ML/MIN/1.73M2 (ref 60–?)
EOSINOPHIL # BLD AUTO: 0.22 X10(3) UL (ref 0–0.7)
EOSINOPHIL NFR BLD AUTO: 2.9 %
ERYTHROCYTE [DISTWIDTH] IN BLOOD BY AUTOMATED COUNT: 13.2 %
GLOBULIN PLAS-MCNC: 3.7 G/DL (ref 2.8–4.4)
GLUCOSE BLD-MCNC: 120 MG/DL (ref 70–99)
GLUCOSE BLD-MCNC: 81 MG/DL (ref 70–99)
GLUCOSE BLD-MCNC: 92 MG/DL (ref 70–99)
HCO3 BLD-SCNC: 19.3 MEQ/L
HCO3 BLD-SCNC: 21.7 MEQ/L
HCT VFR BLD AUTO: 31.5 %
HCT VFR BLD CALC: 26 %
HCT VFR BLD CALC: 34 %
HGB BLD-MCNC: 10.8 G/DL
IMM GRANULOCYTES # BLD AUTO: 0.07 X10(3) UL (ref 0–1)
IMM GRANULOCYTES NFR BLD: 0.9 %
LYMPHOCYTES # BLD AUTO: 1.22 X10(3) UL (ref 1–4)
LYMPHOCYTES NFR BLD AUTO: 15.9 %
MCH RBC QN AUTO: 29.5 PG (ref 26–34)
MCHC RBC AUTO-ENTMCNC: 34.3 G/DL (ref 31–37)
MCV RBC AUTO: 86.1 FL
MONOCYTES # BLD AUTO: 0.89 X10(3) UL (ref 0.1–1)
MONOCYTES NFR BLD AUTO: 11.6 %
NEUTROPHILS # BLD AUTO: 5.24 X10 (3) UL (ref 1.5–7.7)
NEUTROPHILS # BLD AUTO: 5.24 X10(3) UL (ref 1.5–7.7)
NEUTROPHILS NFR BLD AUTO: 68.3 %
OSMOLALITY SERPL CALC.SUM OF ELEC: 289 MOSM/KG (ref 275–295)
PCO2 BLD: 35.7 MMHG
PCO2 BLD: 36.2 MMHG
PH BLD: 7.34 [PH]
PH BLD: 7.39 [PH]
PLATELET # BLD AUTO: 67 10(3)UL (ref 150–450)
PO2 BLD: 255 MMHG
PO2 BLD: 400 MMHG
POTASSIUM BLD-SCNC: 3.3 MMOL/L (ref 3.6–5.1)
POTASSIUM BLD-SCNC: 3.4 MMOL/L (ref 3.6–5.1)
POTASSIUM SERPL-SCNC: 3.4 MMOL/L (ref 3.5–5.1)
PROT SERPL-MCNC: 5.8 G/DL (ref 6.4–8.2)
RBC # BLD AUTO: 3.66 X10(6)UL
RH BLOOD TYPE: POSITIVE
SAO2 % BLD: 100 %
SAO2 % BLD: 100 %
SODIUM BLD-SCNC: 138 MMOL/L (ref 136–145)
SODIUM BLD-SCNC: 140 MMOL/L (ref 136–145)
SODIUM SERPL-SCNC: 141 MMOL/L (ref 136–145)
SRA HIGH DOSE HEPARIN: <1 %
SRA LOW DOSE HEPARIN: <1 %
WBC # BLD AUTO: 7.7 X10(3) UL (ref 4–11)

## 2024-07-02 PROCEDURE — 30233N1 TRANSFUSION OF NONAUTOLOGOUS RED BLOOD CELLS INTO PERIPHERAL VEIN, PERCUTANEOUS APPROACH: ICD-10-PCS | Performed by: INTERNAL MEDICINE

## 2024-07-02 PROCEDURE — 99231 SBSQ HOSP IP/OBS SF/LOW 25: CPT | Performed by: HOSPITALIST

## 2024-07-02 PROCEDURE — 3078F DIAST BP <80 MM HG: CPT | Performed by: HOSPITALIST

## 2024-07-02 PROCEDURE — 3E0T3BZ INTRODUCTION OF ANESTHETIC AGENT INTO PERIPHERAL NERVES AND PLEXI, PERCUTANEOUS APPROACH: ICD-10-PCS | Performed by: INTERNAL MEDICINE

## 2024-07-02 PROCEDURE — 0WJG0ZZ INSPECTION OF PERITONEAL CAVITY, OPEN APPROACH: ICD-10-PCS | Performed by: SURGERY

## 2024-07-02 PROCEDURE — 76942 ECHO GUIDE FOR BIOPSY: CPT | Performed by: INTERNAL MEDICINE

## 2024-07-02 PROCEDURE — 36430 TRANSFUSION BLD/BLD COMPNT: CPT | Performed by: INTERNAL MEDICINE

## 2024-07-02 PROCEDURE — BF45ZZZ ULTRASONOGRAPHY OF LIVER: ICD-10-PCS | Performed by: SURGERY

## 2024-07-02 PROCEDURE — BF151ZZ FLUOROSCOPY OF LIVER USING LOW OSMOLAR CONTRAST: ICD-10-PCS | Performed by: RADIOLOGY

## 2024-07-02 PROCEDURE — 3008F BODY MASS INDEX DOCD: CPT | Performed by: HOSPITALIST

## 2024-07-02 PROCEDURE — 0F170ZB BYPASS COMMON HEPATIC DUCT TO SMALL INTESTINE, OPEN APPROACH: ICD-10-PCS | Performed by: SURGERY

## 2024-07-02 PROCEDURE — 3074F SYST BP LT 130 MM HG: CPT | Performed by: HOSPITALIST

## 2024-07-02 PROCEDURE — 0F9930Z DRAINAGE OF COMMON BILE DUCT WITH DRAINAGE DEVICE, PERCUTANEOUS APPROACH: ICD-10-PCS | Performed by: RADIOLOGY

## 2024-07-02 RX ORDER — MEPERIDINE HYDROCHLORIDE 25 MG/ML
12.5 INJECTION INTRAMUSCULAR; INTRAVENOUS; SUBCUTANEOUS AS NEEDED
Status: DISCONTINUED | OUTPATIENT
Start: 2024-07-02 | End: 2024-07-02 | Stop reason: HOSPADM

## 2024-07-02 RX ORDER — ONDANSETRON 2 MG/ML
INJECTION INTRAMUSCULAR; INTRAVENOUS
Status: COMPLETED
Start: 2024-07-02 | End: 2024-07-02

## 2024-07-02 RX ORDER — ROCURONIUM BROMIDE 10 MG/ML
INJECTION, SOLUTION INTRAVENOUS AS NEEDED
Status: DISCONTINUED | OUTPATIENT
Start: 2024-07-02 | End: 2024-07-02 | Stop reason: SURG

## 2024-07-02 RX ORDER — NALOXONE HYDROCHLORIDE 0.4 MG/ML
0.08 INJECTION, SOLUTION INTRAMUSCULAR; INTRAVENOUS; SUBCUTANEOUS
Status: DISCONTINUED | OUTPATIENT
Start: 2024-07-02 | End: 2024-07-05

## 2024-07-02 RX ORDER — MIDAZOLAM HYDROCHLORIDE 1 MG/ML
INJECTION INTRAMUSCULAR; INTRAVENOUS AS NEEDED
Status: DISCONTINUED | OUTPATIENT
Start: 2024-07-02 | End: 2024-07-02 | Stop reason: SURG

## 2024-07-02 RX ORDER — MIDAZOLAM HYDROCHLORIDE 1 MG/ML
1 INJECTION INTRAMUSCULAR; INTRAVENOUS EVERY 5 MIN PRN
Status: DISCONTINUED | OUTPATIENT
Start: 2024-07-02 | End: 2024-07-02 | Stop reason: HOSPADM

## 2024-07-02 RX ORDER — HYDROMORPHONE HYDROCHLORIDE 1 MG/ML
INJECTION, SOLUTION INTRAMUSCULAR; INTRAVENOUS; SUBCUTANEOUS AS NEEDED
Status: DISCONTINUED | OUTPATIENT
Start: 2024-07-02 | End: 2024-07-02 | Stop reason: SURG

## 2024-07-02 RX ORDER — SODIUM CHLORIDE 9 MG/ML
INJECTION, SOLUTION INTRAVENOUS CONTINUOUS
Status: DISCONTINUED | OUTPATIENT
Start: 2024-07-02 | End: 2024-07-05

## 2024-07-02 RX ORDER — LIDOCAINE HYDROCHLORIDE 10 MG/ML
INJECTION, SOLUTION EPIDURAL; INFILTRATION; INTRACAUDAL; PERINEURAL AS NEEDED
Status: DISCONTINUED | OUTPATIENT
Start: 2024-07-02 | End: 2024-07-02 | Stop reason: SURG

## 2024-07-02 RX ORDER — ENOXAPARIN SODIUM 100 MG/ML
40 INJECTION SUBCUTANEOUS DAILY
Status: DISCONTINUED | OUTPATIENT
Start: 2024-07-03 | End: 2024-07-05

## 2024-07-02 RX ORDER — HEPARIN SODIUM 5000 [USP'U]/ML
5000 INJECTION, SOLUTION INTRAVENOUS; SUBCUTANEOUS ONCE
Status: DISCONTINUED | OUTPATIENT
Start: 2024-07-02 | End: 2024-07-02

## 2024-07-02 RX ORDER — LIDOCAINE HYDROCHLORIDE 10 MG/ML
INJECTION, SOLUTION INFILTRATION; PERINEURAL
Status: COMPLETED
Start: 2024-07-02 | End: 2024-07-02

## 2024-07-02 RX ORDER — SODIUM CHLORIDE 9 MG/ML
INJECTION, SOLUTION INTRAVENOUS CONTINUOUS
Status: DISCONTINUED | OUTPATIENT
Start: 2024-07-02 | End: 2024-07-03

## 2024-07-02 RX ORDER — ONDANSETRON 2 MG/ML
4 INJECTION INTRAMUSCULAR; INTRAVENOUS EVERY 6 HOURS PRN
Status: DISCONTINUED | OUTPATIENT
Start: 2024-07-02 | End: 2024-07-05

## 2024-07-02 RX ORDER — ONDANSETRON 2 MG/ML
4 INJECTION INTRAMUSCULAR; INTRAVENOUS EVERY 6 HOURS PRN
Status: DISCONTINUED | OUTPATIENT
Start: 2024-07-02 | End: 2024-07-02 | Stop reason: HOSPADM

## 2024-07-02 RX ORDER — HYDROMORPHONE HYDROCHLORIDE 1 MG/ML
0.2 INJECTION, SOLUTION INTRAMUSCULAR; INTRAVENOUS; SUBCUTANEOUS EVERY 5 MIN PRN
Status: DISCONTINUED | OUTPATIENT
Start: 2024-07-02 | End: 2024-07-02 | Stop reason: HOSPADM

## 2024-07-02 RX ORDER — SODIUM CHLORIDE, SODIUM LACTATE, POTASSIUM CHLORIDE, CALCIUM CHLORIDE 600; 310; 30; 20 MG/100ML; MG/100ML; MG/100ML; MG/100ML
INJECTION, SOLUTION INTRAVENOUS CONTINUOUS PRN
Status: DISCONTINUED | OUTPATIENT
Start: 2024-07-02 | End: 2024-07-02 | Stop reason: SURG

## 2024-07-02 RX ORDER — PANTOPRAZOLE SODIUM 40 MG/1
40 TABLET, DELAYED RELEASE ORAL
Status: DISCONTINUED | OUTPATIENT
Start: 2024-07-03 | End: 2024-07-05

## 2024-07-02 RX ORDER — DIPHENHYDRAMINE HYDROCHLORIDE 50 MG/ML
12.5 INJECTION INTRAMUSCULAR; INTRAVENOUS EVERY 4 HOURS PRN
Status: DISCONTINUED | OUTPATIENT
Start: 2024-07-02 | End: 2024-07-05

## 2024-07-02 RX ORDER — HYDROMORPHONE HYDROCHLORIDE 1 MG/ML
0.6 INJECTION, SOLUTION INTRAMUSCULAR; INTRAVENOUS; SUBCUTANEOUS EVERY 5 MIN PRN
Status: DISCONTINUED | OUTPATIENT
Start: 2024-07-02 | End: 2024-07-02 | Stop reason: HOSPADM

## 2024-07-02 RX ORDER — HYDROMORPHONE HYDROCHLORIDE 1 MG/ML
0.4 INJECTION, SOLUTION INTRAMUSCULAR; INTRAVENOUS; SUBCUTANEOUS EVERY 5 MIN PRN
Status: DISCONTINUED | OUTPATIENT
Start: 2024-07-02 | End: 2024-07-02 | Stop reason: HOSPADM

## 2024-07-02 RX ORDER — CALCIUM GLUCONATE 94 MG/ML
INJECTION, SOLUTION INTRAVENOUS AS NEEDED
Status: DISCONTINUED | OUTPATIENT
Start: 2024-07-02 | End: 2024-07-02 | Stop reason: SURG

## 2024-07-02 RX ORDER — MIDAZOLAM HYDROCHLORIDE 1 MG/ML
INJECTION INTRAMUSCULAR; INTRAVENOUS
Status: COMPLETED
Start: 2024-07-02 | End: 2024-07-02

## 2024-07-02 RX ORDER — NALOXONE HYDROCHLORIDE 0.4 MG/ML
0.08 INJECTION, SOLUTION INTRAMUSCULAR; INTRAVENOUS; SUBCUTANEOUS AS NEEDED
Status: DISCONTINUED | OUTPATIENT
Start: 2024-07-02 | End: 2024-07-02 | Stop reason: HOSPADM

## 2024-07-02 RX ORDER — METRONIDAZOLE 500 MG/100ML
500 INJECTION, SOLUTION INTRAVENOUS ONCE
Status: COMPLETED | OUTPATIENT
Start: 2024-07-02 | End: 2024-07-02

## 2024-07-02 RX ORDER — DEXAMETHASONE SODIUM PHOSPHATE 4 MG/ML
VIAL (ML) INJECTION AS NEEDED
Status: DISCONTINUED | OUTPATIENT
Start: 2024-07-02 | End: 2024-07-02 | Stop reason: SURG

## 2024-07-02 RX ORDER — SODIUM CHLORIDE, SODIUM LACTATE, POTASSIUM CHLORIDE, CALCIUM CHLORIDE 600; 310; 30; 20 MG/100ML; MG/100ML; MG/100ML; MG/100ML
INJECTION, SOLUTION INTRAVENOUS CONTINUOUS
Status: DISCONTINUED | OUTPATIENT
Start: 2024-07-02 | End: 2024-07-02 | Stop reason: HOSPADM

## 2024-07-02 RX ORDER — SODIUM CHLORIDE, SODIUM LACTATE, POTASSIUM CHLORIDE, CALCIUM CHLORIDE 600; 310; 30; 20 MG/100ML; MG/100ML; MG/100ML; MG/100ML
INJECTION, SOLUTION INTRAVENOUS CONTINUOUS
Status: DISCONTINUED | OUTPATIENT
Start: 2024-07-02 | End: 2024-07-05

## 2024-07-02 RX ORDER — EPHEDRINE SULFATE 50 MG/ML
INJECTION INTRAVENOUS AS NEEDED
Status: DISCONTINUED | OUTPATIENT
Start: 2024-07-02 | End: 2024-07-02 | Stop reason: SURG

## 2024-07-02 RX ORDER — HYDROMORPHONE HYDROCHLORIDE 1 MG/ML
INJECTION, SOLUTION INTRAMUSCULAR; INTRAVENOUS; SUBCUTANEOUS
Status: DISCONTINUED
Start: 2024-07-02 | End: 2024-07-02 | Stop reason: WASHOUT

## 2024-07-02 RX ORDER — PROCHLORPERAZINE EDISYLATE 5 MG/ML
5 INJECTION INTRAMUSCULAR; INTRAVENOUS EVERY 8 HOURS PRN
Status: DISCONTINUED | OUTPATIENT
Start: 2024-07-02 | End: 2024-07-02 | Stop reason: HOSPADM

## 2024-07-02 RX ORDER — DIPHENHYDRAMINE HYDROCHLORIDE 50 MG/ML
12.5 INJECTION INTRAMUSCULAR; INTRAVENOUS AS NEEDED
Status: DISCONTINUED | OUTPATIENT
Start: 2024-07-02 | End: 2024-07-02 | Stop reason: HOSPADM

## 2024-07-02 RX ADMIN — ROCURONIUM BROMIDE 70 MG: 10 INJECTION, SOLUTION INTRAVENOUS at 13:27:00

## 2024-07-02 RX ADMIN — METRONIDAZOLE 500 MG: 500 INJECTION, SOLUTION INTRAVENOUS at 13:30:00

## 2024-07-02 RX ADMIN — LIDOCAINE HYDROCHLORIDE 50 MG: 10 INJECTION, SOLUTION EPIDURAL; INFILTRATION; INTRACAUDAL; PERINEURAL at 13:27:00

## 2024-07-02 RX ADMIN — ROCURONIUM BROMIDE 10 MG: 10 INJECTION, SOLUTION INTRAVENOUS at 15:47:00

## 2024-07-02 RX ADMIN — MIDAZOLAM HYDROCHLORIDE 2 MG: 1 INJECTION INTRAMUSCULAR; INTRAVENOUS at 13:24:00

## 2024-07-02 RX ADMIN — HYDROMORPHONE HYDROCHLORIDE 0.5 MG: 1 INJECTION, SOLUTION INTRAMUSCULAR; INTRAVENOUS; SUBCUTANEOUS at 14:18:00

## 2024-07-02 RX ADMIN — HYDROMORPHONE HYDROCHLORIDE 0.5 MG: 1 INJECTION, SOLUTION INTRAMUSCULAR; INTRAVENOUS; SUBCUTANEOUS at 14:43:00

## 2024-07-02 RX ADMIN — CALCIUM GLUCONATE 1 G: 94 INJECTION, SOLUTION INTRAVENOUS at 16:36:00

## 2024-07-02 RX ADMIN — ROCURONIUM BROMIDE 10 MG: 10 INJECTION, SOLUTION INTRAVENOUS at 16:17:00

## 2024-07-02 RX ADMIN — EPHEDRINE SULFATE 5 MG: 50 INJECTION INTRAVENOUS at 13:34:00

## 2024-07-02 RX ADMIN — SODIUM CHLORIDE, SODIUM LACTATE, POTASSIUM CHLORIDE, CALCIUM CHLORIDE: 600; 310; 30; 20 INJECTION, SOLUTION INTRAVENOUS at 16:30:00

## 2024-07-02 RX ADMIN — SODIUM CHLORIDE, SODIUM LACTATE, POTASSIUM CHLORIDE, CALCIUM CHLORIDE: 600; 310; 30; 20 INJECTION, SOLUTION INTRAVENOUS at 13:20:00

## 2024-07-02 RX ADMIN — DEXAMETHASONE SODIUM PHOSPHATE 4 MG: 4 MG/ML VIAL (ML) INJECTION at 13:32:00

## 2024-07-02 NOTE — ANESTHESIA PROCEDURE NOTES
Airway  Date/Time: 7/2/2024 1:29 PM  Urgency: elective      General Information and Staff    Patient location during procedure: OR  Anesthesiologist: Nicko Howard MD  Performed: anesthesiologist   Performed by: Nicko Howard MD  Authorized by: Nicko Howard MD      Indications and Patient Condition  Indications for airway management: anesthesia  Sedation level: deep  Preoxygenated: yes  Patient position: sniffing  Mask difficulty assessment: 1 - vent by mask    Final Airway Details  Final airway type: endotracheal airway      Successful airway: ETT  Cuffed: yes   Successful intubation technique: direct laryngoscopy  Endotracheal tube insertion site: oral  Blade: Ryan  Blade size: #3  ETT size (mm): 7.0    Cormack-Lehane Classification: grade I - full view of glottis  Placement verified by: capnometry   Cuff volume (mL): 8  Measured from: lips  ETT to lips (cm): 21  Number of attempts at approach: 1    Additional Comments  Atraumatic

## 2024-07-02 NOTE — PLAN OF CARE
Patient down to IR at approx 0900, NPO status in place. Patient denies pain. Patient returned to unit from IR. Upon arrival to room from IR, ite with small to moderate amount of bloody drainage, site monitored and no additional drainage noted. Patient c/o mild pain to right abd. STACIE drain in place to bulb suction, 30ml output. Operative consent obtained by previous shift. Patient down to OR via bed at approx 1230. Hospital provided language line solutions  used- see flowsheet. Fall and safety precautions in place. Patient updated and in agreement with POC. Son at bedside. Ketamine infusion ordered, writer confirmed w katerine in OR that infusion is not to be started on the med onc floor, the surgical team will start the infusion.     Patient to transfer to CTU 7 post operatively, writer informed pt and son. Report called to Michelle on CTU7.

## 2024-07-02 NOTE — ANESTHESIA PROCEDURE NOTES
Arterial Line    Date/Time: 7/2/2024 1:35 PM    Performed by: Nicko Howard MD  Authorized by: Nicko Howard MD    General Information and Staff    Procedure Start:  7/2/2024 1:35 PM  Procedure End:  7/2/2024 1:35 PM  Anesthesiologist:  Nicko Howard MD  Performed By:  Anesthesiologist  Patient Location:  OR  Indication: continuous blood pressure monitoring and blood sampling needed    Site Identification: real time ultrasound guided and image stored and retrievable    Preanesthetic Checklist: 2 patient identifiers, IV checked, risks and benefits discussed, monitors and equipment checked, pre-op evaluation, timeout performed, anesthesia consent and sterile technique used    Procedure Details    Catheter Size:  20 G  Catheter Length:  1 and 3/4 inch  Catheter Type:  Arrow  Seldinger Technique?: Yes    Laterality:  Left  Site:  Radial artery  Site Prep: chlorhexidine    Line Secured:  Wrist Brace, tape and Tegaderm    Assessment    Events: patient tolerated procedure well with no complications      Medications  7/2/2024 1:35 PM      Additional Comments

## 2024-07-02 NOTE — PROGRESS NOTES
[facility]  Progress Note    Stephy De Oliveira Patient Status:  Inpatient    1980 MRN [id number]   Location [facility] 4NW-A Attending Moshe López MD   Hosp Day # 7 PCP None Pcp     Subjective:  The patient was seen and examined at bedside. A Guamanian interpretor was used. She denies abdominal pain. She denies nausea or vomiting. She is passing flatus and having bowel movements. She has no questions about today's operation or IR procedure.     Objective/Physical Exam:  BP 99/64 (BP Location: Right arm)   Pulse 69   Temp 98.1 °F (36.7 °C) (Temporal)   Resp 22   Ht 62\"   Wt 137 lb 4.8 oz (62.3 kg)   SpO2 99%   BMI 25.11 kg/m²     Intake/Output Summary (Last 24 hours) at 2024 1104  Last data filed at 2024 0600  Gross per 24 hour   Intake 2798.9 ml   Output 275 ml   Net 2523.9 ml         General: Alert, oriented x3. No acute distress.  HEENT: Normocephalic, atraumatic. No scleral icterus.  Pulmonary: No respiratory distress, effort normal.   Abdomen: Non-distended, without tympany to percussion. Soft, non-tender to palpation. No rebound or guarding. No peritoneal signs.   Incision: laparoscopic incision sites are clean, dry, intact without surrounding erythema or cellulitis. Steri-strips are in place  Extremities: No lower extremity edema. No clubbing or cyanosis.   Skin: Warm, dry. No jaundice.   Drain: mixed ss and bilious output      Labs:  Lab Results   Component Value Date    WBC 7.7 2024    HGB 10.8 2024    HCT 31.5 2024    PLT 67.0 2024      Lab Results   Component Value Date    INR 1.05 2024     Lab Results   Component Value Date     2024    K 3.4 2024     2024    CO2 24.0 2024    BUN 6 2024    CREATSERUM 0.38 2024    GLU 92 2024    CA 8.0 2024    ALKPHO 108 2024     2024    AST 73 2024    BILT 1.9 2024    ALB 2.1 2024    TP 5.8 2024           Assessment:  Patient Active Problem List   Diagnosis    Elevated liver enzymes    Elevated bilirubin    Common bile duct (CBD) obstruction (HCC)    Calculus of gallbladder with acute cholecystitis and obstruction    Choledocholithiasis    Thrombocytopenia (HCC)     POD 6 laparoscopic cholecystectomy with intraoperative cholangiogram, preoperative ERCP for choledocholithiasis  Postoperative bile leak s/p ERCP, MRCP  CBD injury    Plan:  PTCA today to better evaluate the patient's biliary anatomy   OR today with Dr. Bernardo for exploration, hepatico-jejunostomy   Npo for operation  Antiemetics and analgesics as needed   Encourage ambulation   Encourage incentive spirometer  DVT prophylaxis with SCDs-lovenox on hod for surgery  GI prophylaxis with protonix       The patient was discussed with Dr. Pereyra , and she is in agreement with the assessment and plan. My total face time with this patient was 25 minutes. Greater than half of the visit was spent in counseling the patient on the above listed diagnoses and treatment options.     Rebekah Santiago PA-C  7/2/2024  11:04 AM    PTCA reviewed with Dr. Bernardo.  It appears that the source of the bile leak is at the bifurcation of the right and left common hepatic ducts.  The patient is scheduled for surgery today with exploratory laparotomy, hepaticojejunostomy with Dr Bernardo.  Using  ID number [id number]  Thi denies any abdominal pain although she is having some discomfort in the area of the PTC this morning.  PTC results were reviewed with Stephy.  Her  is not at the bedside at this time.  We did offer to call her  via  prior to surgery.  She declined at this time.  Further details of the procedure today will be discussed with the patient by Dr. Bernardo  I agree with the note above and attest to its accuracy with the following changes below after my interview and examination of the patient    The patient was seen and  examined.  Available labs and radiology is noted.    Cheyanne Pereyra MD FACS    Please note that this report has been produced using speech recognition software and may contain errors related to that system including but not limited to errors in grammar, punctuation and spelling as well as words and phrases that possibly may have been recognized inappropriately.  If there are any questions or concerns please contact the dictating provider for clarification.    The 21st Century Cures Act makes medical notes like these available to patients in the interest of trans parency. Please be advised this is a medical document. Medical documents are intended to carry relevant information, facts as evident, and the clinical opinion of the practitioner. The medical note is intended as peer to peer communication and may appear blunt or direct. It is written in medical language and may contain abbreviations or verbiage that are unfamiliar.   Again if there are any questions or concerns please contact the dictating provider for clarification.         All

## 2024-07-02 NOTE — BRIEF OP NOTE
Pre-Operative Diagnosis: BILE LEAK     Post-Operative Diagnosis: BILE LEAK      Procedure Performed:   Exploratory laparotomy, eze-en-y hepaticojejunostomy, intraoperative ultrasound, omental pedicle flap.    Surgeons and Role:     * Jian Bernardo MD - Primary    Assistant(s):  PA: Sarah Reese PA     Surgical Findings: See full operative note     Specimen: No     Estimated Blood Loss: Blood Output: 50 mL (7/2/2024  4:50 PM)    FLAVIA Rowell  7/2/2024  5:17 PM

## 2024-07-02 NOTE — PROGRESS NOTES
No new events    Blood pressure 99/64, pulse 69, temperature 98.1 °F (36.7 °C), temperature source Temporal, resp. rate 22, height 1.575 m (5' 2\"), weight 62.3 kg (137 lb 4.8 oz), SpO2 99%.    Intake/Output Summary (Last 24 hours) at 7/2/2024 0758  Last data filed at 7/2/2024 0600  Gross per 24 hour   Intake 2798.9 ml   Output 275 ml   Net 2523.9 ml     Lab Results   Component Value Date    WBC 7.7 07/02/2024    HGB 10.8 07/02/2024    HCT 31.5 07/02/2024    PLT 67.0 07/02/2024    CREATSERUM 0.38 07/02/2024    BUN 6 07/02/2024     07/02/2024    K 3.4 07/02/2024     07/02/2024    CO2 24.0 07/02/2024    GLU 92 07/02/2024    CA 8.0 07/02/2024    ALB 2.1 07/02/2024    ALKPHO 108 07/02/2024    BILT 1.9 07/02/2024    TP 5.8 07/02/2024    AST 73 07/02/2024     07/02/2024     Constitutional:  NAD.   Eyes: non-icteric.    Abdomen: Soft, non-tender, non-distended. Drain bilious  Musculoskeletal: no edema.  -There are no acute surgical issues.  -Via Andorran , I discussed exploration, hepatico-jejunostomy including risks.  -Possible findings not treated/amenable to surgery discussed.  -Possible PTC IR prior.  -Patient agreed and understood.  -Her  was present.  -Both had ample time to ask questions.    Jian Bernardo MD

## 2024-07-02 NOTE — ANESTHESIA POSTPROCEDURE EVALUATION
[facility] Thu Angela De Oliveira Patient Status:  Inpatient   Age/Gender 44 year old female MRN [id number]   Location [facility] SURGERY Attending Moshe López MD   Hosp Day # 7 PCP None Pcp       Anesthesia Post-op Note    Exploratory laparotomy, eze-en-y hepaticojejunostomy, intraoperative ultrasound, omental pedicle flap.    Procedure Summary       Date: 07/02/24 Room / Location:  MAIN OR 10 / [facility] MAIN OR    Anesthesia Start: 1320 Anesthesia Stop: 1723    Procedure: Exploratory laparotomy, eze-en-y hepaticojejunostomy, intraoperative ultrasound, omental pedicle flap. (Abdomen) Diagnosis: (BILE LEAK)    Surgeons: Jian Bernardo MD Anesthesiologist: Nicko Howard MD    Anesthesia Type: general ASA Status: 2            Anesthesia Type: general    Vitals Value Taken Time   /72 07/02/24 1724   Temp 97 07/02/24 1724   Pulse 81 07/02/24 1723   Resp 13 07/02/24 1723   SpO2 94 % 07/02/24 1723   Vitals shown include unfiled device data.    Patient Location: PACU    Anesthesia Type: general    Airway Patency: patent    Postop Pain Control: adequate    Mental Status: mildly sedated but able to meaningfully participate in the post-anesthesia evaluation    Nausea/Vomiting: none    Cardiopulmonary/Hydration status: stable euvolemic    Complications: no apparent anesthesia related complications    Postop vital signs: stable    Dental Exam: Unchanged from Preop    Patient to be discharged from PACU when criteria met.

## 2024-07-02 NOTE — ANESTHESIA PROCEDURE NOTES
Regional Block    Date/Time: 7/2/2024 1:40 PM    Performed by: Nicko Howard MD  Authorized by: Nicko Howard MD      General Information and Staff    Start Time:  7/2/2024 1:40 PM  End Time:  7/2/2024 1:45 PM  Anesthesiologist:  Nicko Howard MD  Performed by:  Anesthesiologist  Patient Location:  OR    Block Placement: Post Induction  Site Identification: real time ultrasound guided and image stored and retrievable    Block site/laterality marked before start: site marked  Reason for Block: at surgeon's request and post-op pain management    Preanesthetic Checklist: 2 patient identifers, IV checked, risks and benefits discussed, monitors and equipment checked, pre-op evaluation, timeout performed, anesthesia consent, sterile technique used, no prohibitive neurological deficits and no local skin infection at insertion site      Procedure Details    Patient Position:   Prep: ChloraPrep    Monitoring:  Cardiac monitor, continuous pulse ox and blood pressure cuff  Injection Technique:  Single-shot    Needle    Needle Type:  Short-bevel and echogenic  Needle Gauge:  21 G  Needle Length:  110 mm  Needle Localization:  Ultrasound guidance  Reason for Ultrasound Use: appropriate spread of the medication was noted in real time and no ultrasound evidence of intravascular and/or intraneural injection            Assessment    Injection Assessment:  Good spread noted, negative resistance, negative aspiration for heme, incremental injection and low pressure  Heart Rate Change: No    - Patient tolerated block procedure well without evidence of immediate block related complications.     Medications  7/2/2024 1:40 PM      Additional Comments    Medication:  Bupivacaine 0.25% 40 ml

## 2024-07-02 NOTE — PLAN OF CARE
Pt received A&Ox4, Korean speaking. VSS. RA. Tele. Afebrile. Denies pain. Medications given per MAR, receiving Zosyn. IVF. NPO @ MN. Call light within reach. STACIE drain intact & draining - 130cc out overnight. Family at bedside. CHG cloth bath given.    Problem: GASTROINTESTINAL - ADULT  Goal: Minimal or absence of nausea and vomiting  Description: INTERVENTIONS:  - Maintain adequate hydration with IV or PO as ordered and tolerated  - Nasogastric tube to low intermittent suction as ordered  - Evaluate effectiveness of ordered antiemetic medications  - Provide nonpharmacologic comfort measures as appropriate  - Advance diet as tolerated, if ordered  - Obtain nutritional consult as needed  - Evaluate fluid balance  Outcome: Progressing  Goal: Maintains or returns to baseline bowel function  Description: INTERVENTIONS:  - Assess bowel function  - Maintain adequate hydration with IV or PO as ordered and tolerated  - Evaluate effectiveness of GI medications  - Encourage mobilization and activity  - Obtain nutritional consult as needed  - Establish a toileting routine/schedule  - Consider collaborating with pharmacy to review patient's medication profile  Outcome: Progressing     Problem: METABOLIC/FLUID AND ELECTROLYTES - ADULT  Goal: Hemodynamic stability and optimal renal function maintained  Description: INTERVENTIONS:  - Monitor labs and assess for signs and symptoms of volume excess or deficit  - Monitor intake, output and patient weight  - Monitor urine specific gravity, serum osmolarity and serum sodium as indicated or ordered  - Monitor response to interventions for patient's volume status, including labs, urine output, blood pressure (other measures as available)  - Encourage oral intake as appropriate  - Instruct patient on fluid and nutrition restrictions as appropriate  Outcome: Progressing

## 2024-07-02 NOTE — PROGRESS NOTES
[facility]   part of [facility]     Hospitalist Progress Note     Stephy De Oliveira Patient Status:  Observation    1980 MRN [id number]   Location [facility] 4NW-A Attending Rush Mendez MD   Hosp Day # 7 PCP None Pcp     Chief Complaint: Abdominal pain    Subjective:   Patient denies abdominal pain. No nausea or vomiting.     Current medications:   heparin  5,000 Units Subcutaneous Once    cefTRIAXone  2 g Intravenous Once    metRONIDAZOLE  500 mg Intravenous Once    potassium chloride  40 mEq Intravenous Once    docusate sodium  100 mg Oral BID    [Held by provider] enoxaparin  40 mg Subcutaneous Daily    pantoprazole  20 mg Oral QAM AC    piperacillin-tazobactam  3.375 g Intravenous Q8H       Objective:    Review of Systems:   10 system review completed.    Vital signs:  Temp:  [98.1 °F (36.7 °C)-98.7 °F (37.1 °C)] 98.1 °F (36.7 °C)  Pulse:  [69-95] 69  Resp:  [17-25] 22  BP: ()/(58-69) 99/64  SpO2:  [98 %-100 %] 99 %  Patient Weight for the past 72 hrs:   Weight   24 1949 135 lb (61.2 kg)     Physical Exam:    General: No acute distress.   Respiratory: Decreased breath sounds   Cardiovascular: S1, S2. Regular rate and rhythm.   Abdomen: Soft BS+  Extremities: No edema.    Diagnostic Data:    Labs:  Recent Labs   Lab 24  0644 24  1402 24  0732 24  0624 24  1124 24  0553   WBC 9.4  --  8.7 8.2 11.4* 7.7   HGB 12.2  --  11.7* 12.0 13.0 10.8*   MCV 87.0  --  85.2 86.3 87.7 86.1   PLT 67.0*  --  77.0* 86.0* 89.0* 67.0*   INR  --  1.05  --   --   --   --        Recent Labs   Lab 24  0624 24  2135 24  0720 24  0553   GLU 97  --  111* 92   BUN 7*  --  3* 6*   CREATSERUM 0.43*  --  0.46* 0.38*   CA 8.0*  --  8.4* 8.0*   ALB 2.4*  --  2.4* 2.1*     --  134* 141   K 3.0* 4.0 3.8 3.4*     --  104 111   CO2 23.0  --  24.0 24.0   ALKPHO 100*  --  122* 108*   AST 86*  --  96* 73*   *  --  333* 276*   BILT 1.9  --   2.8* 1.9   TP 5.9*  --  6.4 5.8*       Estimated Creatinine Clearance: 152.5 mL/min (A) (based on SCr of 0.38 mg/dL (L)).    Recent Labs   Lab 06/28/24  1402   PTP 13.7   INR 1.05            COVID-19 Lab Results    COVID-19  No results found for: \"COVID19\"    Pro-Calcitonin  No results for input(s): \"PCT\" in the last 168 hours.    Cardiac  No results for input(s): \"TROP\", \"PBNP\" in the last 168 hours.    Creatinine Kinase  No results for input(s): \"CK\" in the last 168 hours.    Inflammatory Markers  No results for input(s): \"CRP\", \"DENIS\", \"LDH\", \"DDIMER\" in the last 168 hours.    No results for input(s): \"TROP\", \"TROPHS\", \"CK\" in the last 168 hours.    Imaging: Imaging data reviewed in Epic.    Medications:    heparin  5,000 Units Subcutaneous Once    cefTRIAXone  2 g Intravenous Once    metRONIDAZOLE  500 mg Intravenous Once    potassium chloride  40 mEq Intravenous Once    docusate sodium  100 mg Oral BID    [Held by provider] enoxaparin  40 mg Subcutaneous Daily    pantoprazole  20 mg Oral QAM AC    piperacillin-tazobactam  3.375 g Intravenous Q8H       Assessment & Plan:    Abdominal pain with obstructive jaundice d/t choledocholithiasis sp EUS/ERCP with biliary sphincterotomy, balloon stone extraction and PD stent placement 6/26 sp lap khalif with IOC 6/26, biliary leak 6/27 sp ERCP which noted biliary leak from cystic duct, but unable to have biliary stent placed d/t mid common bile duct obstruction possibly d/t CBD clip  NPO  IVF  IV abx  Pain control  KUB (PD stent) expected ~7/2/2024  IR PTCA today, OR later?  GI, General surgery & SurgOnc consult   Thrombocytopenia, sepsis/meds/consumptive/HIT, HHIPA neg, counts improving   Monitor counts  Hematology consult     Supplementary Documentation:   Quality:  DVT Prophylaxis: Lovenox - hold     At this point Ms. De Oliveira is expected to be discharge to: Home    Plan of care discussed with patient, family and RN,    Moshe López MD

## 2024-07-02 NOTE — PROCEDURES
[facility]   part of [facility]  Procedure Note    Stephy De Oliveira Patient Status:  Inpatient    1980 MRN [id number]   Location [facility] 4NW-A Attending Moshe López MD   Hosp Day # 7 PCP None Pcp     Procedure: Catheter placement traversing biliary ducts through common bile duct injury for rendevous procedure    Pre-Procedure Diagnosis:  Common bile duct injury    Post-Procedure Diagnosis: Same    Anesthesia:  Sedation    Findings:  Extravasation of contrast near the confluence of the bile ducts    Specimens: None    Blood Loss:  < 5 cc    Tourniquet Time: None  Complications:  None  Drains:  None    Secondary Diagnosis:  N/A    Jalen Donnelly MD  2024

## 2024-07-03 LAB
ALBUMIN SERPL-MCNC: 2.1 G/DL (ref 3.4–5)
ALBUMIN/GLOB SERPL: 0.6 {RATIO} (ref 1–2)
ALP LIVER SERPL-CCNC: 111 U/L
ALT SERPL-CCNC: 312 U/L
ANION GAP SERPL CALC-SCNC: 9 MMOL/L (ref 0–18)
AST SERPL-CCNC: 129 U/L (ref 15–37)
BASOPHILS # BLD AUTO: 0.04 X10(3) UL (ref 0–0.2)
BASOPHILS NFR BLD AUTO: 0.3 %
BILIRUB SERPL-MCNC: 1.9 MG/DL (ref 0.1–2)
BUN BLD-MCNC: 6 MG/DL (ref 9–23)
CALCIUM BLD-MCNC: 8.1 MG/DL (ref 8.5–10.1)
CHLORIDE SERPL-SCNC: 106 MMOL/L (ref 98–112)
CO2 SERPL-SCNC: 24 MMOL/L (ref 21–32)
CREAT BLD-MCNC: 0.34 MG/DL
EGFRCR SERPLBLD CKD-EPI 2021: 132 ML/MIN/1.73M2 (ref 60–?)
EOSINOPHIL # BLD AUTO: 0.07 X10(3) UL (ref 0–0.7)
EOSINOPHIL NFR BLD AUTO: 0.4 %
ERYTHROCYTE [DISTWIDTH] IN BLOOD BY AUTOMATED COUNT: 13.2 %
GLOBULIN PLAS-MCNC: 3.8 G/DL (ref 2.8–4.4)
GLUCOSE BLD-MCNC: 95 MG/DL (ref 70–99)
HCT VFR BLD AUTO: 39.2 %
HGB BLD-MCNC: 13.9 G/DL
IMM GRANULOCYTES # BLD AUTO: 0.15 X10(3) UL (ref 0–1)
IMM GRANULOCYTES NFR BLD: 0.9 %
LYMPHOCYTES # BLD AUTO: 1.08 X10(3) UL (ref 1–4)
LYMPHOCYTES NFR BLD AUTO: 6.8 %
MAGNESIUM SERPL-MCNC: 1.7 MG/DL (ref 1.6–2.6)
MCH RBC QN AUTO: 29.9 PG (ref 26–34)
MCHC RBC AUTO-ENTMCNC: 35.5 G/DL (ref 31–37)
MCV RBC AUTO: 84.3 FL
MONOCYTES # BLD AUTO: 1.65 X10(3) UL (ref 0.1–1)
MONOCYTES NFR BLD AUTO: 10.4 %
NEUTROPHILS # BLD AUTO: 12.8 X10 (3) UL (ref 1.5–7.7)
NEUTROPHILS # BLD AUTO: 12.8 X10(3) UL (ref 1.5–7.7)
NEUTROPHILS NFR BLD AUTO: 81.2 %
OSMOLALITY SERPL CALC.SUM OF ELEC: 285 MOSM/KG (ref 275–295)
PHOSPHATE SERPL-MCNC: 3.7 MG/DL (ref 2.5–4.9)
PLATELET # BLD AUTO: 98 10(3)UL (ref 150–450)
POTASSIUM SERPL-SCNC: 4 MMOL/L (ref 3.5–5.1)
POTASSIUM SERPL-SCNC: 4 MMOL/L (ref 3.5–5.1)
PROT SERPL-MCNC: 5.9 G/DL (ref 6.4–8.2)
RBC # BLD AUTO: 4.65 X10(6)UL
SODIUM SERPL-SCNC: 139 MMOL/L (ref 136–145)
WBC # BLD AUTO: 15.8 X10(3) UL (ref 4–11)

## 2024-07-03 PROCEDURE — 99232 SBSQ HOSP IP/OBS MODERATE 35: CPT | Performed by: HOSPITALIST

## 2024-07-03 RX ORDER — MAGNESIUM SULFATE HEPTAHYDRATE 40 MG/ML
2 INJECTION, SOLUTION INTRAVENOUS ONCE
Status: COMPLETED | OUTPATIENT
Start: 2024-07-03 | End: 2024-07-03

## 2024-07-03 RX ORDER — KETOROLAC TROMETHAMINE 15 MG/ML
15 INJECTION, SOLUTION INTRAMUSCULAR; INTRAVENOUS EVERY 6 HOURS PRN
Status: DISPENSED | OUTPATIENT
Start: 2024-07-03 | End: 2024-07-05

## 2024-07-03 NOTE — PLAN OF CARE
Assumed care at approximately 1930.   A & O x 4, lethargic.   Yoruba speaking.   2 L O2 NC  NSR on tele.   Patient reports abdominal pain overnight. Pain managed with PCA dilaudid and ketamine gtt.   Midline incision with gauze, topifoam and abdominal binder.  D/I, scant amount of old serosanguinous drainage noted  X 2 STACIE drains with bloody output.   Villafana in place, adequate urine output.  Call light within reach.   All questions addressed.  Patient and family updated on plan of care.

## 2024-07-03 NOTE — OPERATIVE REPORT
[facility]    PATIENT'S NAME: JOSELITO INFANTE   ATTENDING PHYSICIAN: Katrina Reddy M.D.   OPERATING PHYSICIAN: Jian Bernardo MD   PATIENT ACCOUNT#:   [id number]    LOCATION:  [address] [place] [address]  MEDICAL RECORD #:   [id number]       YOB: 1980  ADMISSION DATE:       06/24/2024      OPERATION DATE:  07/02/2024    OPERATIVE REPORT      PREOPERATIVE DIAGNOSIS:  Postcholecystectomy bile duct injury.     POSTOPERATIVE DIAGNOSIS:  Postcholecystectomy bile duct injury.     PROCEDURE:    1.   Alejandra-en-Y hepaticojejunostomy.  2.   Intraoperative ultrasound.    3.   Omental pedicle flap.    ASSISTANT:  Lary Reese PA-C.    ANESTHESIA:  General.    INDICATIONS:  Patient is a 44-year-old woman who last week underwent laparoscopic cholecystectomy for acute cholecystitis.  She was noted to have bile within the drain and presents today for exploration and was suspected for a bile duct injury.  She was worked up by GI and by IR and our radiology colleagues.  The surgical treatment matter had been discussed with her including indications at length.    FINDINGS:  Strasberg E type bile duct injury at the hilum tangentially extending into the hilum particularly on the right side with a probe identifying both right and left hepatic ducts.  Intraoperative ultrasound of the liver confirmed portal venous flow and only arterial flow to the left side.  No arterial flow to the right side was identified.  In addition, there were multiple clips within the hilum.  The distal aspect of the common hepatic duct was not readily identifiable. Given type of injury, this was a difficult surgery.    OPERATIVE TECHNIQUE:  Patient was brought to the operating room and was placed in supine position.  She was given general anesthesia by the anesthesiology service.  Sequential compression boots were placed.  The patient received preoperative intravenous antibiotic prophylaxis.  She was prepped and draped in normal sterile  fashion.  An upper midline incision extending to the umbilicus was made and deepened.  The fascia was incised.  The peritoneal cavity was entered.  The falciform ligament was taken down, and I used a Mireles retractor to help in exposure.  There was bile-tinged fluid in the abdomen that was irrigated and cleared.  Attention was focused then to the right upper quadrant whereby flimsy adhesions were taken down.  This identified an opening within the hilum.  This was confirmed to be bile duct in nature probing the right and left hepatic ducts and clearly identifying them.  The injury was Strasberg E type at the hilum with both right and left ducts identifiable with back wall in continuity.  The injury was tangential with multiple clips in the vicinity without dopplerable arterial pulses.  An ultrasound of the liver confirmed portal venous flow and only arterial flow to the left side of the liver.  This was also confirmed intraoperatively by Dr. Donnelly from IR. After irrigation of the right upper quadrant, I proceeded with repair using Alejandra-en-Y hepaticojejunostomy.  I divided the jejunum about 40 to 50 cm from the ligament of Treitz with its mesentery divided using a Sonicision.  I then delivered the distal end in a retrocolic fashion to perform the hepaticojejunostomy.  The region of note was thickened, and I performed the hepaticojejunostomy end-to-side using interrupted 3-0 Prolene sutures on the posterior wall including identifiable mucosa of the ducts and a running 3-0 Prolene suture anteriorly.  We further irrigated the abdomen and mobilized the omental flap of the right side of the colon, dividing omental vessels.  The flap was then delivered to cover the anastomosis anteriorly and sutured in place using 3-0 silk suture.  The retrocolic jejunal loop was stitched in place to the mesentery using 3-0 silk suture.  Two Nicko drains were placed in the vicinity of the anastomosis, one anterior, one posterior and  stitched in place using 3-0 nylon sutures.  The fascia was then reapproximated using #1 PDS.  The skin was stapled.  Sterile dressings were then applied.    The patient tolerated the procedure well without immediate complications.  She was extubated in the operating room and was sent in stable condition to recovery room.  Counts were correct.  I was present throughout the procedure    Dictated By Jian Bernardo MD  d: 07/02/2024 17:19:31  t: 07/03/2024 02:59:07  Job [id number]  [facility]/

## 2024-07-03 NOTE — PHYSICAL THERAPY NOTE
Order received, chart reviewed. Pt sleeping, will see pt tomorrow for PT evaluation, communicated with RN. Will follow as appropriate.     Mikki Beach, PT, DPT  07/03/24

## 2024-07-03 NOTE — PROGRESS NOTES
[facility]  Progress Note    Stephy De Oliveira Patient Status:  Inpatient    1980 MRN [id number]   Location [facility] 7NE-A Attending Katrina Reddy MD   Hosp Day # 8 PCP None Pcp     Subjective:  She is seen and examined resting in bed. A  service was used throughout this encounter. She reports feeling sleepy this AM. She reports abdominal pain. She reports no other concerns at this time.     Objective/Physical Exam:  /71 (BP Location: Left arm)   Pulse 93   Temp 98.2 °F (36.8 °C) (Temporal)   Resp (!) 27   Ht 62\"   Wt 137 lb 4.8 oz   SpO2 98%   BMI 25.11 kg/m²     Intake/Output Summary (Last 24 hours) at 7/3/2024 1140  Last data filed at 7/3/2024 0715  Gross per 24 hour   Intake 2836.3 ml   Output 2385 ml   Net 451.3 ml         General: Sleepy  No apparent distress.  Pulm: no respiratory distress, no increased work of breathing  Abdomen:  Soft, non-distended,abdominal binder in place, with no rebound or guarding.  No peritoneal signs.  Incision:  Clean, dry, intact without erythema. Dressing in place.     Labs:  Lab Results   Component Value Date    WBC 15.8 2024    HGB 13.9 2024    HCT 39.2 2024    PLT 98.0 2024      Lab Results   Component Value Date    INR 1.05 2024     Lab Results   Component Value Date     2024    K 4.0 2024    K 4.0 2024     2024    CO2 24.0 2024    BUN 6 2024    CREATSERUM 0.34 2024    GLU 95 2024    CA 8.1 2024    ALKPHO 111 2024     2024     2024    BILT 1.9 2024    ALB 2.1 2024    TP 5.9 2024            Assessment:  Patient Active Problem List   Diagnosis    Elevated liver enzymes    Elevated bilirubin    Common bile duct (CBD) obstruction (HCC)    Calculus of gallbladder with acute cholecystitis and obstruction    Choledocholithiasis    Thrombocytopenia (HCC)       POD 1 exploratory laparotomy,  eze-en-y hepaticojejunostomy, intraoperative ultrasound, omental pedicle flap (Az)  POD  7 laparoscopic cholecystectomy with intraoperative cholangiogram, preoperative ERCP for choledocholithiasis   Postoperative bile leak s/p ERCP, MRCP  CBD injury    Plan:  Diet and pain management per surgical oncology  Encourage ambulation as able  Encourage incentive spirometer  DVT prophylaxis with with lovenox   GI prophylaxis with with protonix   General surgery to follow peripherally over the holiday and Weekend. We will re-evaluate the patient on Monday, July 8th if patient is still in house.     Any Shepard PA-C  7/3/2024  11:40 AM

## 2024-07-03 NOTE — PROGRESS NOTES
POD#1 s/p Exploratory laparotomy, eze-en-y hepaticojejunostomy, intraoperative ultrasound, omental pedicle flap    Patient is drowsy and sleepy this morning. She nods yes or no to questioning. NGT with 200 out overnight.     Blood pressure 106/71, pulse 93, temperature 98.2 °F (36.8 °C), temperature source Temporal, resp. rate (!) 27, height 1.575 m (5' 2\"), weight 62.3 kg (137 lb 4.8 oz), SpO2 98%.    Intake/Output Summary (Last 24 hours) at 7/3/2024 1120  Last data filed at 7/3/2024 0715  Gross per 24 hour   Intake 2836.3 ml   Output 2385 ml   Net 451.3 ml     Lab Results   Component Value Date    WBC 15.8 07/03/2024    HGB 13.9 07/03/2024    HCT 39.2 07/03/2024    PLT 98.0 07/03/2024    CREATSERUM 0.34 07/03/2024    BUN 6 07/03/2024     07/03/2024    K 4.0 07/03/2024    K 4.0 07/03/2024     07/03/2024    CO2 24.0 07/03/2024    GLU 95 07/03/2024    CA 8.1 07/03/2024    ALB 2.1 07/03/2024    ALKPHO 111 07/03/2024    BILT 1.9 07/03/2024    TP 5.9 07/03/2024     07/03/2024     07/03/2024    MG 1.7 07/03/2024    PHOS 3.7 07/03/2024     Constitutional:  NAD.   Eyes: non-icteric.    Abdomen: Soft, appropriate post op tenderness, non-distended. Incision healing well without drainage or erythema. Right superior abdominal STACIE drain with bilious output. Right inferior abdominal STACIE drain SS output. NGT in place with clear output.   Musculoskeletal: no edema.    - No acute surgical issues  - NGT pulled back slightly, keep to LIS  - OK for sips of CLD  - On Zosyn for bile leak  - Pain: PCA and Ketamine 0.1. No tylenol  - Decrease IVF  - CTM drain output. LFTs mildly increased compared to yesterday. Total Bilirubin improved to 1.9.   - DVT and GI prophy  - Replace electrolytes per protocol  - PT/OT/IS    DW Dr Az Reese PA  Attending:  I saw and examined patient.  Discussed surgery.  Drains dark blood with some bile tinge.  NGT removed  Villafana out in am  Stressed ambulation  Jian Bernardo,  MD

## 2024-07-03 NOTE — PLAN OF CARE
Assumed care at approx 0730.  Patient lethargic this AM, easy to wake. Oriented x4. Pakistani speaking, language line used.  On room air, respirations even and unlabored.  NSR on tele.  Dressing to midline incision with old serosanguineous dressing.  STACIE drains intact x2 to right abdomen. Left NG tube removed.  Malik in place, urine output meets goals, patient menstruating. Per Dr Bernardo malik to be removed on 7/4 at 6am.  Pain controlled with meds. Ketamine gtt and Dilaudid PCA running as ordered.   Magnesium replaced per electrolyte protocol.  Safety precautions maintained, patient reports needs met, call light within reach.      Problem: GASTROINTESTINAL - ADULT  Goal: Minimal or absence of nausea and vomiting  Description: INTERVENTIONS:  - Maintain adequate hydration with IV or PO as ordered and tolerated  - Nasogastric tube to low intermittent suction as ordered  - Evaluate effectiveness of ordered antiemetic medications  - Provide nonpharmacologic comfort measures as appropriate  - Advance diet as tolerated, if ordered  - Obtain nutritional consult as needed  - Evaluate fluid balance  Outcome: Progressing  Goal: Maintains or returns to baseline bowel function  Description: INTERVENTIONS:  - Assess bowel function  - Maintain adequate hydration with IV or PO as ordered and tolerated  - Evaluate effectiveness of GI medications  - Encourage mobilization and activity  - Obtain nutritional consult as needed  - Establish a toileting routine/schedule  - Consider collaborating with pharmacy to review patient's medication profile  Outcome: Progressing     Problem: METABOLIC/FLUID AND ELECTROLYTES - ADULT  Goal: Hemodynamic stability and optimal renal function maintained  Description: INTERVENTIONS:  - Monitor labs and assess for signs and symptoms of volume excess or deficit  - Monitor intake, output and patient weight  - Monitor urine specific gravity, serum osmolarity and serum sodium as indicated or ordered  -  Monitor response to interventions for patient's volume status, including labs, urine output, blood pressure (other measures as available)  - Encourage oral intake as appropriate  - Instruct patient on fluid and nutrition restrictions as appropriate  Outcome: Progressing

## 2024-07-03 NOTE — PROGRESS NOTES
[facility]   part of [facility]     Hospitalist Progress Note     Stephy De Oliveira Patient Status:  Observation    1980 MRN [id number]   Location [facility] 4NW-A Attending Rush Mendez MD   Hosp Day # 8 PCP None Pcp     Chief Complaint: Abdominal pain    Subjective:     Pain controlled    Current medications:   enoxaparin  40 mg Subcutaneous Daily    pantoprazole  40 mg Intravenous QAM AC    Or    pantoprazole  40 mg Oral QAM AC    piperacillin-tazobactam  3.375 g Intravenous Q8H       Objective:    Review of Systems:   10 system review completed.    Vital signs:  Temp:  [97.2 °F (36.2 °C)-98.6 °F (37 °C)] 97.8 °F (36.6 °C)  Pulse:  [] 91  Resp:  [10-27] 23  BP: (106-132)/(68-84) 112/68  SpO2:  [92 %-99 %] 95 %  AO: (131-155)/(66-96) 142/83  Patient Weight for the past 72 hrs:   Weight   24 1949 135 lb (61.2 kg)     Physical Exam:    General: No acute distress.   Respiratory: Decreased breath sounds   Cardiovascular: S1, S2. Regular rate and rhythm.   Abdomen: Soft BS+  Extremities: No edema.    Diagnostic Data:    Labs:  Recent Labs   Lab 24  1402 24  0732 24  0624 24  1124 24  0553 24  0601   WBC  --  8.7 8.2 11.4* 7.7 15.8*   HGB  --  11.7* 12.0 13.0 10.8* 13.9   MCV  --  85.2 86.3 87.7 86.1 84.3   PLT  --  77.0* 86.0* 89.0* 67.0* 98.0*   INR 1.05  --   --   --   --   --        Recent Labs   Lab 24  0720 24  0553 24  0601   * 92 95   BUN 3* 6* 6*   CREATSERUM 0.46* 0.38* 0.34*   CA 8.4* 8.0* 8.1*   ALB 2.4* 2.1* 2.1*   * 141 139   K 3.8 3.4* 4.0  4.0    111 106   CO2 24.0 24.0 24.0   ALKPHO 122* 108* 111*   AST 96* 73* 129*   * 276* 312*   BILT 2.8* 1.9 1.9   TP 6.4 5.8* 5.9*       Estimated Creatinine Clearance: 170.5 mL/min (A) (based on SCr of 0.34 mg/dL (L)).    Recent Labs   Lab 24  1402   PTP 13.7   INR 1.05            COVID-19 Lab Results    COVID-19  No results found for:  \"COVID19\"    Pro-Calcitonin  No results for input(s): \"PCT\" in the last 168 hours.    Cardiac  No results for input(s): \"TROP\", \"PBNP\" in the last 168 hours.    Creatinine Kinase  No results for input(s): \"CK\" in the last 168 hours.    Inflammatory Markers  No results for input(s): \"CRP\", \"DENIS\", \"LDH\", \"DDIMER\" in the last 168 hours.    No results for input(s): \"TROP\", \"TROPHS\", \"CK\" in the last 168 hours.    Imaging: Imaging data reviewed in Epic.    Medications:    enoxaparin  40 mg Subcutaneous Daily    pantoprazole  40 mg Intravenous QAM AC    Or    pantoprazole  40 mg Oral QAM AC    piperacillin-tazobactam  3.375 g Intravenous Q8H       Assessment & Plan:    #Abdominal pain with obstructive jaundice d/t choledocholithiasis sp EUS/ERCP with biliary sphincterotomy, balloon stone extraction and PD stent placement 6/26 sp lap khalif with IOC 6/26, biliary leak 6/27 sp ERCP which noted biliary leak from cystic duct, but unable to have biliary stent placed d/t mid common bile duct obstruction possibly d/t CBD clip, s/p Exploratory lap, eze-en-y hepaticojejunostomy, intraoperative US, omental pedicle flap  -NG to LIS  -CLD  -Abx  -PCA and ketamine  -trend LFTs    #TCP  -Monitor counts  -Hematology consult     Supplementary Documentation:   Quality:  DVT Prophylaxis: Lovenox - hold     At this point Ms. De Oliveira is expected to be discharge to: Home    Plan of care discussed with patient, family and RN,    Katrina Reddy M.D.  [facility]

## 2024-07-04 PROBLEM — K83.9 BILE LEAK: Status: ACTIVE | Noted: 2024-07-04

## 2024-07-04 LAB
ALBUMIN SERPL-MCNC: 2 G/DL (ref 3.4–5)
ALBUMIN/GLOB SERPL: 0.6 {RATIO} (ref 1–2)
ALP LIVER SERPL-CCNC: 90 U/L
ALT SERPL-CCNC: 213 U/L
ANION GAP SERPL CALC-SCNC: 5 MMOL/L (ref 0–18)
AST SERPL-CCNC: 72 U/L (ref 15–37)
BASOPHILS # BLD AUTO: 0.03 X10(3) UL (ref 0–0.2)
BASOPHILS NFR BLD AUTO: 0.3 %
BILIRUB SERPL-MCNC: 1.6 MG/DL (ref 0.1–2)
BUN BLD-MCNC: 6 MG/DL (ref 9–23)
CALCIUM BLD-MCNC: 8 MG/DL (ref 8.5–10.1)
CHLORIDE SERPL-SCNC: 106 MMOL/L (ref 98–112)
CO2 SERPL-SCNC: 28 MMOL/L (ref 21–32)
CREAT BLD-MCNC: 0.41 MG/DL
EGFRCR SERPLBLD CKD-EPI 2021: 126 ML/MIN/1.73M2 (ref 60–?)
EOSINOPHIL # BLD AUTO: 0.23 X10(3) UL (ref 0–0.7)
EOSINOPHIL NFR BLD AUTO: 2.6 %
ERYTHROCYTE [DISTWIDTH] IN BLOOD BY AUTOMATED COUNT: 12.9 %
GLOBULIN PLAS-MCNC: 3.5 G/DL (ref 2.8–4.4)
GLUCOSE BLD-MCNC: 94 MG/DL (ref 70–99)
HCT VFR BLD AUTO: 33.5 %
HGB BLD-MCNC: 11.7 G/DL
IMM GRANULOCYTES # BLD AUTO: 0.07 X10(3) UL (ref 0–1)
IMM GRANULOCYTES NFR BLD: 0.8 %
LYMPHOCYTES # BLD AUTO: 1 X10(3) UL (ref 1–4)
LYMPHOCYTES NFR BLD AUTO: 11.2 %
MAGNESIUM SERPL-MCNC: 2 MG/DL (ref 1.6–2.6)
MCH RBC QN AUTO: 29.6 PG (ref 26–34)
MCHC RBC AUTO-ENTMCNC: 34.9 G/DL (ref 31–37)
MCV RBC AUTO: 84.8 FL
MONOCYTES # BLD AUTO: 1.21 X10(3) UL (ref 0.1–1)
MONOCYTES NFR BLD AUTO: 13.6 %
NEUTROPHILS # BLD AUTO: 6.38 X10 (3) UL (ref 1.5–7.7)
NEUTROPHILS # BLD AUTO: 6.38 X10(3) UL (ref 1.5–7.7)
NEUTROPHILS NFR BLD AUTO: 71.5 %
OSMOLALITY SERPL CALC.SUM OF ELEC: 285 MOSM/KG (ref 275–295)
PLATELET # BLD AUTO: 86 10(3)UL (ref 150–450)
PLATELETS.RETICULATED NFR BLD AUTO: 5.9 % (ref 0–7)
POTASSIUM SERPL-SCNC: 3.4 MMOL/L (ref 3.5–5.1)
PROT SERPL-MCNC: 5.5 G/DL (ref 6.4–8.2)
RBC # BLD AUTO: 3.95 X10(6)UL
SODIUM SERPL-SCNC: 139 MMOL/L (ref 136–145)
WBC # BLD AUTO: 8.9 X10(3) UL (ref 4–11)

## 2024-07-04 PROCEDURE — 99232 SBSQ HOSP IP/OBS MODERATE 35: CPT | Performed by: HOSPITALIST

## 2024-07-04 RX ORDER — POTASSIUM CHLORIDE 20 MEQ/1
40 TABLET, EXTENDED RELEASE ORAL ONCE
Status: COMPLETED | OUTPATIENT
Start: 2024-07-04 | End: 2024-07-04

## 2024-07-04 RX ORDER — POTASSIUM CHLORIDE 1.5 G/1.58G
40 POWDER, FOR SOLUTION ORAL EVERY 4 HOURS
Status: ACTIVE | OUTPATIENT
Start: 2024-07-04 | End: 2024-07-04

## 2024-07-04 RX ORDER — OXYCODONE HYDROCHLORIDE 5 MG/1
5 TABLET ORAL EVERY 4 HOURS PRN
Status: DISCONTINUED | OUTPATIENT
Start: 2024-07-04 | End: 2024-07-05

## 2024-07-04 NOTE — PLAN OF CARE
Assumed care 0730  A+Ox4, RA, SR on tele  Tolerating CLD diet   -ADAT -> FLD  IS reinforced   -750mL-1000mL   -instructed 10 breaths an hour  Ambulating SBA  Villafana out   -voiding w/o complaint  Call light in reach, needs addressed

## 2024-07-04 NOTE — PLAN OF CARE
Assumed care at approximately 1930.   A & O x 4.  Lithuanian speaking.   GAUTAM SOLORIO on tele.   Patient reports abdominal pain overnight. Pain managed with PCA dilaudid and PRN Toradol.    Midline incision with gauze, topifoam and abdominal binder.  D/I, scant amount of old serosanguinous drainage noted  X 2 STACIE drains with serosanguinous/bile colored output.   Villafana in place, adequate urine output. Villafana taken out this AM at 0600.   Call light within reach.   Patient and family updated on plan of care.

## 2024-07-04 NOTE — PROGRESS NOTES
[facility]   part of [facility]     Hospitalist Progress Note     Stephy De Oliveira Patient Status:  Observation    1980 MRN [id number]   Location [facility] 4NW-A Attending Rush Mendez MD   Hosp Day # 9 PCP None Pcp     Chief Complaint: Abdominal pain    Subjective:     Pain controlled, no nausea    Current medications:   enoxaparin  40 mg Subcutaneous Daily    pantoprazole  40 mg Intravenous QAM AC    Or    pantoprazole  40 mg Oral QAM AC       Objective:    Review of Systems:   10 system review completed.    Vital signs:  Temp:  [98 °F (36.7 °C)-99.3 °F (37.4 °C)] 98.4 °F (36.9 °C)  Pulse:  [75-89] 75  Resp:  [13-24] 16  BP: ()/(63-72) 112/72  SpO2:  [92 %-95 %] 95 %  Patient Weight for the past 72 hrs:   Weight   24 1949 135 lb (61.2 kg)     Physical Exam:    General: No acute distress.   Respiratory: Decreased breath sounds   Cardiovascular: S1, S2. Regular rate and rhythm.   Abdomen: Soft BS+  Extremities: No edema.    Diagnostic Data:    Labs:  Recent Labs   Lab 24  1402 24  0732 24  0624 24  1124 24  0553 24  0601 24  0512   WBC  --    < > 8.2 11.4* 7.7 15.8* 8.9   HGB  --    < > 12.0 13.0 10.8* 13.9 11.7*   MCV  --    < > 86.3 87.7 86.1 84.3 84.8   PLT  --    < > 86.0* 89.0* 67.0* 98.0* 86.0*   INR 1.05  --   --   --   --   --   --     < > = values in this interval not displayed.       Recent Labs   Lab 24  0553 24  0601 24  0512   GLU 92 95 94   BUN 6* 6* 6*   CREATSERUM 0.38* 0.34* 0.41*   CA 8.0* 8.1* 8.0*   ALB 2.1* 2.1* 2.0*    139 139   K 3.4* 4.0  4.0 3.4*    106 106   CO2 24.0 24.0 28.0   ALKPHO 108* 111* 90   AST 73* 129* 72*   * 312* 213*   BILT 1.9 1.9 1.6   TP 5.8* 5.9* 5.5*       Estimated Creatinine Clearance: 141.4 mL/min (A) (based on SCr of 0.41 mg/dL (L)).    Recent Labs   Lab 24  1402   PTP 13.7   INR 1.05            COVID-19 Lab Results    COVID-19  No results found  for: \"COVID19\"    Pro-Calcitonin  No results for input(s): \"PCT\" in the last 168 hours.    Cardiac  No results for input(s): \"TROP\", \"PBNP\" in the last 168 hours.    Creatinine Kinase  No results for input(s): \"CK\" in the last 168 hours.    Inflammatory Markers  No results for input(s): \"CRP\", \"DENIS\", \"LDH\", \"DDIMER\" in the last 168 hours.    No results for input(s): \"TROP\", \"TROPHS\", \"CK\" in the last 168 hours.    Imaging: Imaging data reviewed in Epic.    Medications:    enoxaparin  40 mg Subcutaneous Daily    pantoprazole  40 mg Intravenous QAM AC    Or    pantoprazole  40 mg Oral QAM AC       Assessment & Plan:    #Abdominal pain with obstructive jaundice d/t choledocholithiasis sp EUS/ERCP with biliary sphincterotomy, balloon stone extraction and PD stent placement 6/26 sp lap khalif with IOC 6/26, biliary leak 6/27 sp ERCP which noted biliary leak from cystic duct, but unable to have biliary stent placed d/t mid common bile duct obstruction possibly d/t CBD clip, s/p Exploratory lap, eze-en-y hepaticojejunostomy, intraoperative US, omental pedicle flap  -NG to LIS  -FLD  -Abx completed  -PCA   -trend LFTs    #TCP  -Monitor counts    Supplementary Documentation:   Quality:  DVT Prophylaxis: Lovenox - hold     At this point Ms. De Oliveira is expected to be discharge to: Home    Plan of care discussed with patient, family and RN,    Katrina Reddy M.D.  [facility]

## 2024-07-04 NOTE — PHYSICAL THERAPY NOTE
PHYSICAL THERAPY EVALUATION - INPATIENT     Room Number: 7606/7606-A  Evaluation Date: 7/4/2024  Type of Evaluation: Initial  Physician Order: PT Eval and Treat    Presenting Problem: abdominal pain with obstructive jaundice, gallstones, s/p below surgeries  Co-Morbidities : none  Reason for Therapy: Mobility Dysfunction and Discharge Planning    PHYSICAL THERAPY ASSESSMENT   Patient is currently functioning near baseline with bed mobility, transfers, gait, and stair negotiation.  Prior to admission, patient's baseline is independent.  Patient is requiring supervision as a result of the following impairments: decreased functional strength, decreased endurance/aerobic capacity, pain, impaired standing balance, impaired motor planning, decreased muscular endurance, and medical status.  Physical Therapy will continue to follow for duration of hospitalization.    Given the patient is functioning near baseline level do not anticipate skilled therapy needs at discharge .    PLAN  PT Treatment Plan: Bed mobility;Body mechanics;Endurance;Energy conservation;Patient education;Family education;Gait training;Balance training;Transfer training;Strengthening;Stair training  Rehab Potential : Good  Frequency (Obs): 3x/week  Number of Visits to Meet Established Goals: 3      CURRENT GOALS    Goal #1 Patient is able to demonstrate supine - sit EOB @ level: modified independent     Goal #2 Patient is able to demonstrate transfers EOB to/from Chair/Wheelchair at assistance level: modified independent     Goal #3 Patient is able to ambulate 150 feet with assist device: none at assistance level: supervision     Goal #4 Pt is able to ascend and descend flight of stairs with supervision   Goal #5    Goal #6    Goal Comments: Goals established on 7/4/2024      PHYSICAL THERAPY MEDICAL/SOCIAL HISTORY  History related to current admission: Patient is a 44 year old female admitted on 6/24/2024 from home for abdominal pain.  Pt  diagnosed with gallstones, s/p below surgeries.     S/p EGD/EUC    S/p ERCP with biliary sphincterectomy, balloon stone extraction and PD stent placement    S/p laparoscopic cholecystectomy with intraoperative cholangiogram and ICG    S/p ERCP   S/p expl lap, reoux-en0y hepaticojejunostomy, intraoperative ultrasound, omental pedicle flap  for bile leak       HOME SITUATION  Type of Home: House   Home Layout: Two level  Stairs to Enter : 0     Stairs to Bedroom: 17  Railing: Yes    Lives With: Spouse  Drives: Yes  Patient Owned Equipment: None  Patient Regularly Uses: None    Prior Level of [place]: Pt typically indep with ADLs and mobility. 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     SUBJECTIVE  \"It hurts\"       OBJECTIVE  Precautions: Abdominal protective strategies;Drain(s); needed (Indonesian)  Fall Risk: Standard fall risk    WEIGHT BEARING RESTRICTION  Weight Bearing Restriction: None                PAIN ASSESSMENT  Ratin          COGNITION  Overall Cognitive Status:  WFL - within functional limits    RANGE OF MOTION AND STRENGTH ASSESSMENT  Upper extremity ROM and strength are within functional limits     Lower extremity ROM is within functional limits      Lower extremity strength is within functional limits        BALANCE  Static Sitting: Fair +  Dynamic Sitting: Fair +  Static Standing: Fair -  Dynamic Standing: Poor +    ADDITIONAL TESTS                                    ACTIVITY TOLERANCE  Pulse: 75  Heart Rate Source: Monitor                   O2 WALK  Oxygen Therapy  SPO2% on Room Air at Rest: 93    NEUROLOGICAL FINDINGS                        AM-PAC '6-Clicks' INPATIENT SHORT FORM - BASIC MOBILITY  How much difficulty does the patient currently have...  Patient Difficulty: Turning over in bed (including adjusting bedclothes, sheets and blankets)?: A Little   Patient Difficulty: Sitting down on and standing up from a chair with arms (e.g., wheelchair, bedside commode, etc.): A  Little   Patient Difficulty: Moving from lying on back to sitting on the side of the bed?: A Lot   How much help from another person does the patient currently need...   Help from Another: Moving to and from a bed to a chair (including a wheelchair)?: A Little   Help from Another: Need to walk in hospital room?: A Little   Help from Another: Climbing 3-5 steps with a railing?: A Little       AM-PAC Score:  Raw Score: 17   Approx Degree of Impairment: 50.57%   Standardized Score (AM-PAC Scale): 42.13   CMS Modifier (G-Code): CK    FUNCTIONAL ABILITY STATUS  Gait Assessment   Functional Mobility/Gait Assessment  Gait Assistance: Supervision  Distance (ft): 100  Assistive Device: Rolling walker  Pattern:  (forward flexed, dec gait)    Skilled Therapy Provided     Bed Mobility:  Rolling: NT  Supine to sit: Pj   Sit to supine: NT     Transfer Mobility:  Sit to stand: supervision   Stand to sit: supervision  Gait = supervision    Therapist's Comments: RN cleared for session. Pt agreeable for therapy, received supine. Spouse present for session. Ipad interpretor utilized. Pt trained on log roll technique for abdominal protective strategy. Cued on UE/LE placement for optimal mechanics for STS transfer. Pt trained on ambulation with RW - \"push like shopping cart\". Cued on relative upright posture as tolerated. Encouraged ambulation 3x daily. Instructed to call for nursing staff for any needs and OOB mobility.     Exercise/Education Provided:  Bed mobility  Body mechanics  Energy conservation  Functional activity tolerated  Gait training  Posture  Strengthening  Transfer training    Patient End of Session: Up in chair;Call light within reach;RN aware of session/findings;All patient questions and concerns addressed;Family present;Needs met      Patient Evaluation Complexity Level:  History Low - no personal factors and/or co-morbidities   Examination of body systems Moderate - addressing a total of 3 or more elements    Clinical Presentation Moderate - Evolving   Clinical Decision Making Moderate - Evolving       PT Session Time: 30 minutes  Gait Training: 10 minutes  Therapeutic Activity: 15 minutes

## 2024-07-04 NOTE — PROGRESS NOTES
[facility]  Progress Note    Stephy De Oliveira Patient Status:  Inpatient    1980 MRN [id number]   Location [facility] 7NE-A Attending Katrina Reddy MD   Hosp Day # 9 PCP None Pcp     Subjective:  The patient is seen today with assistance of language line .  She reports incisional pain.  She denies nausea or vomiting.  She is tolerating clear liquids well.    Objective/Physical Exam:  General: Alert, orientated x3.  Cooperative.  No apparent distress.  Vital Signs:  Blood pressure 112/72, pulse 75, temperature 98.4 °F (36.9 °C), temperature source Temporal, resp. rate 16, height 62\", weight 137 lb 4.8 oz (62.3 kg), SpO2 95%.  Wt Readings from Last 3 Encounters:   24 137 lb 4.8 oz (62.3 kg)   24 134 lb 14.7 oz (61.2 kg)     Lungs: No respiratory distress.  Cardiac: Regular rate and rhythm.   Abdomen:  Soft, non distended, non tender, with no rebound or guarding.  No peritoneal signs.   Extremities:  No lower extremity edema noted.    Incision: Clean, dry, intact, no erythema  Drain: With bile-tinged, serosanguineous output    Intake/Output:    Intake/Output Summary (Last 24 hours) at 2024 1145  Last data filed at 2024 0952  Gross per 24 hour   Intake 375 ml   Output 3445 ml   Net -3070 ml     I/O last 3 completed shifts:  In: 661.3 [I.V.:261.3; IV PIGGYBACK:400]  Out: 4745 [Urine:3850; Emesis/NG output:400; Drains:495]  No intake/output data recorded.    Medications:    enoxaparin  40 mg Subcutaneous Daily    pantoprazole  40 mg Intravenous QAM AC    Or    pantoprazole  40 mg Oral QAM AC    piperacillin-tazobactam  3.375 g Intravenous Q8H       Labs:  Lab Results   Component Value Date    WBC 8.9 2024    HGB 11.7 2024    HCT 33.5 2024    PLT 86.0 2024     Lab Results   Component Value Date     2024    K 3.4 2024     2024    CO2 28.0 2024    BUN 6 2024    CREATSERUM 0.41 2024    GLU 94 2024     CA 8.0 07/04/2024    ALKPHO 90 07/04/2024     07/04/2024    AST 72 07/04/2024    BILT 1.6 07/04/2024    ALB 2.0 07/04/2024    TP 5.5 07/04/2024     Lab Results   Component Value Date    INR 1.05 06/28/2024         Assessment  Patient Active Problem List   Diagnosis    Elevated liver enzymes    Elevated bilirubin    Common bile duct (CBD) obstruction (HCC)    Calculus of gallbladder with acute cholecystitis and obstruction    Choledocholithiasis    Thrombocytopenia (HCC)     Postop day 2 Alejandra-en-Y hepaticojejunostomy  Status post laparoscopic cholecystectomy with common bile duct injury      Plan:  Advance to full liquid diet.  Add nutritional supplements daily  If patient does well with full liquids, she may advance to soft diet.  Wean off narcotic pain medications as able  Continue drain care.  Ambulate and up to chair.  Physical therapy following.  DVT prophylaxis with heparin   GI prophylaxis with Protonix    Quality:  DVT Mechanical Prophylaxis:   SCDs, Early ambuation  DVT Pharmacologic Prophylaxis   Medication    enoxaparin (Lovenox) 40 MG/0.4ML SUBQ injection 40 mg                Code Status: Not on file  Villafana: No urinary catheter in place  Villafana Duration (in days):   Central line:    NURA:         Vidhi Hewitt PA-C  7/4/2024  11:45 AM    This note was initiated by Vidhi Hewitt PA-C.  The PA saw the patient in conjunction with me. The PA performed a history, exam, and developed the assessment and plan. I agree with the mentioned assessment and plan and have provided further documentation of my own, if necessary.    Eliecer Irwin MD  [facility] General Surgery

## 2024-07-05 VITALS
BODY MASS INDEX: 25.27 KG/M2 | HEART RATE: 89 BPM | DIASTOLIC BLOOD PRESSURE: 74 MMHG | OXYGEN SATURATION: 97 % | SYSTOLIC BLOOD PRESSURE: 112 MMHG | WEIGHT: 137.31 LBS | TEMPERATURE: 98 F | RESPIRATION RATE: 20 BRPM | HEIGHT: 62 IN

## 2024-07-05 LAB
ALBUMIN SERPL-MCNC: 2.2 G/DL (ref 3.4–5)
ALBUMIN/GLOB SERPL: 0.6 {RATIO} (ref 1–2)
ALP LIVER SERPL-CCNC: 95 U/L
ALT SERPL-CCNC: 192 U/L
ANION GAP SERPL CALC-SCNC: 6 MMOL/L (ref 0–18)
AST SERPL-CCNC: 63 U/L (ref 15–37)
BASOPHILS # BLD AUTO: 0.03 X10(3) UL (ref 0–0.2)
BASOPHILS NFR BLD AUTO: 0.4 %
BILIRUB SERPL-MCNC: 1.2 MG/DL (ref 0.1–2)
BLOOD TYPE BARCODE: 7300
BLOOD TYPE BARCODE: 7300
BUN BLD-MCNC: 8 MG/DL (ref 9–23)
CALCIUM BLD-MCNC: 8.4 MG/DL (ref 8.5–10.1)
CHLORIDE SERPL-SCNC: 106 MMOL/L (ref 98–112)
CO2 SERPL-SCNC: 26 MMOL/L (ref 21–32)
CREAT BLD-MCNC: 0.41 MG/DL
EGFRCR SERPLBLD CKD-EPI 2021: 126 ML/MIN/1.73M2 (ref 60–?)
EOSINOPHIL # BLD AUTO: 0.2 X10(3) UL (ref 0–0.7)
EOSINOPHIL NFR BLD AUTO: 2.5 %
ERYTHROCYTE [DISTWIDTH] IN BLOOD BY AUTOMATED COUNT: 12.8 %
GLOBULIN PLAS-MCNC: 3.9 G/DL (ref 2.8–4.4)
GLUCOSE BLD-MCNC: 93 MG/DL (ref 70–99)
HCT VFR BLD AUTO: 33.3 %
HGB BLD-MCNC: 11.6 G/DL
IMM GRANULOCYTES # BLD AUTO: 0.08 X10(3) UL (ref 0–1)
IMM GRANULOCYTES NFR BLD: 1 %
LYMPHOCYTES # BLD AUTO: 0.99 X10(3) UL (ref 1–4)
LYMPHOCYTES NFR BLD AUTO: 12.4 %
MAGNESIUM SERPL-MCNC: 2 MG/DL (ref 1.6–2.6)
MCH RBC QN AUTO: 29.5 PG (ref 26–34)
MCHC RBC AUTO-ENTMCNC: 34.8 G/DL (ref 31–37)
MCV RBC AUTO: 84.7 FL
MONOCYTES # BLD AUTO: 0.87 X10(3) UL (ref 0.1–1)
MONOCYTES NFR BLD AUTO: 10.9 %
NEUTROPHILS # BLD AUTO: 5.8 X10 (3) UL (ref 1.5–7.7)
NEUTROPHILS # BLD AUTO: 5.8 X10(3) UL (ref 1.5–7.7)
NEUTROPHILS NFR BLD AUTO: 72.8 %
OSMOLALITY SERPL CALC.SUM OF ELEC: 284 MOSM/KG (ref 275–295)
PHOSPHATE SERPL-MCNC: 2.9 MG/DL (ref 2.5–4.9)
PLATELET # BLD AUTO: 103 10(3)UL (ref 150–450)
PLATELETS.RETICULATED NFR BLD AUTO: 6.8 % (ref 0–7)
POTASSIUM SERPL-SCNC: 3.7 MMOL/L (ref 3.5–5.1)
PROT SERPL-MCNC: 6.1 G/DL (ref 6.4–8.2)
RBC # BLD AUTO: 3.93 X10(6)UL
SODIUM SERPL-SCNC: 138 MMOL/L (ref 136–145)
UNIT VOLUME: 350 ML
UNIT VOLUME: 350 ML
WBC # BLD AUTO: 8 X10(3) UL (ref 4–11)

## 2024-07-05 PROCEDURE — 99239 HOSP IP/OBS DSCHRG MGMT >30: CPT | Performed by: HOSPITALIST

## 2024-07-05 RX ORDER — POTASSIUM CHLORIDE 20 MEQ/1
40 TABLET, EXTENDED RELEASE ORAL ONCE
Status: COMPLETED | OUTPATIENT
Start: 2024-07-05 | End: 2024-07-05

## 2024-07-05 RX ORDER — OXYCODONE HYDROCHLORIDE 5 MG/1
5 TABLET ORAL EVERY 4 HOURS PRN
Qty: 30 TABLET | Refills: 0 | Status: SHIPPED | OUTPATIENT
Start: 2024-07-05

## 2024-07-05 NOTE — CM/SW NOTE
07/05/24 0800   CM/SW Referral Data   Referral Source Physician   Reason for Referral Discharge planning   Discharge Needs   Anticipated D/C needs Home health care     SW notified by MD of need for HH RN at NH. Pt will be homebound initially, will require assistance with [facility] management.     Addendum 12:30- HH referral checked. Agencies pending, per [facility]) \"Insurance verification shows high out of pocket, and nothing met yet.  $5000 DED, $9450 OOP, 50% COINS\"     Hospital stay not yet billed, unable to determine at this time if HHC would be covered. Asked when services would be billed as hospital encounter not yet billed.     Addendum 1:30- reserved [facility]), aware of NH today.       JEREL Peck

## 2024-07-05 NOTE — PROGRESS NOTES
HPI       Patient is laying in bed. A  service was used throughout this encounter. She rates her abdominal pain a 3-4. Pain is around incision site. Denies nausea or vomiting. States she is able to ambulate. Patient had one dose of Tordol overnight. Patient will trial soft diet today.       /76 (BP Location: Left arm)   Pulse 89   Temp 97.7 °F (36.5 °C) (Oral)   Resp 20   Ht 1.575 m (5' 2\")   Wt 62.3 kg (137 lb 4.8 oz)   SpO2 96%   BMI 25.11 kg/m²       Intake/Output Summary (Last 24 hours) at 7/5/2024 0802  Last data filed at 7/5/2024 0743  Gross per 24 hour   Intake 651 ml   Output 660 ml   Net -9 ml        Lab Results   Component Value Date    WBC 8.0 07/05/2024    HGB 11.6 07/05/2024    HCT 33.3 07/05/2024    .0 07/05/2024    CREATSERUM 0.41 07/05/2024    BUN 8 07/05/2024     07/05/2024    K 3.7 07/05/2024     07/05/2024    CO2 26.0 07/05/2024    GLU 93 07/05/2024    CA 8.4 07/05/2024    ALB 2.2 07/05/2024    ALKPHO 95 07/05/2024    BILT 1.2 07/05/2024    TP 6.1 07/05/2024    AST 63 07/05/2024     07/05/2024    MG 2.0 07/05/2024    PHOS 2.9 07/05/2024       Constitutional:  NAD.   Eyes: non-icteric.    Abdomen: Soft, mildly tender, non-distended. Incision site healing well, staples intact. There is some sanguinous drainage from the inferior aspect of the incision. Surgical dressing removed. Upper surgical drain with bilious output. Lower surgical drain with serosanguinous output.   Musculoskeletal: no edema.     Post- op day 3 Alejandra-en-Y hepaticojejunostomy   - No acute surgical issues.  - Patient is afebrile, wbc within normal limits, bilirubin down trended and within normal limits, LFTs down trending.   - Advance to soft diet, if able to tolerate, anticipate discharge today.   - Transition to PO pain medications.   - Patient will need  home healthcare for surgical drains.   - Replace electrolytes per protocol.  - Continue to ambulate.   Patient seen and examined  with Dr. Bernardo.   FLAVIA Gardner  7/5/2024  8:02 AM

## 2024-07-05 NOTE — DISCHARGE INSTRUCTIONS
Post Op Instructions Hepaticojejunostomy  Thank you for choosing the Surgical Oncology team at [facility].  Managing Your Pain  Take your medications as directed and as needed. Oxycodone 5 mg every 4 hours as needed. DO NOT take acetaminophen, since we operated on and near the liver.   Call our office if the medication prescribed for you doesn't ease your pain.  Don't drive or drink alcohol while you're taking prescription pain medication  Pain medication should help you resume your normal activities. Take enough medication to do your exercises comfortably. However, it's normal for your pain to increase a little as you start to be more active.  Keep track of when you take your pain medication. It works best 30 to 45 minutes after you take it. Taking it when your pain first begins is better than waiting for the pain to get worse.  Pain medication may cause constipation  Managing Constipation  Go to the bathroom at the same time every day.   Exercise. Walking is an excellent form of exercise.  Drink 8 (8-ounce) glasses (2 liters) of liquids daily, if you can. Drink water, juices (such as prune juice), soups, ice cream shakes, and other drinks that don't have caffeine.   If you haven't had a bowel movement in 3 days, call our office  Caring for Your Incision  It's normal for the skin below your incision to feel numb. This happens because some of the nerves were cut during your surgery. The numbness will go away over time.  Leave the dressing on for 48 hours after surgery. The clear outer dressing (Tegaderm) can then come off.  The staples in your incision will be removed in the office after surgery. This is usually about 2 weeks after you're discharged.   If you go home with Steri-Strips on your incision, they will loosen and fall off by themselves. If they haven't fallen off within 14 days, you can take them off.  If the area around your incision is red, puffy, or if you have any drainage from your  incision, contact our office.  Showering  You may shower 48 hours after surgery. When you shower, use mild soap to gently wash your incision. After you shower, pat the area dry with a clean towel. Don't rub over your incision. Leave your incision uncovered, unless there's drainage.  Avoid tub baths until your surgeon says it's okay.  Eating and Drinking  You may resume a soft diet when you go home. You may not be able to eat as much as you did before your surgery. Try to eat 4 to 6 small meals a day.  Drink plenty of liquids. Try to drink 8 (8-ounce) glasses of liquids every day. Don't drink alcohol until you check with your surgeon.  Activity and Exercise  Remember, recovery after surgery takes several weeks. It is common to feel tired or fatigued. Rest as needed.   Doing aerobic exercise, such as walking and stair climbing, will help you gain strength and feel better. Gradually increase the distance you walk. Rest or stop as needed.  Don't lift anything heavier than 10 pounds for at least 8 weeks after your surgery or until your surgeon says it's okay.   Avoid strenuous activity and exercises until your surgeon says it's okay.  Ask your surgeon when it is okay for you to drive.   Ask your surgeon when you can return to work.  When to Call:  Let us know if you experience the following symptoms:  Fever of 100.4° F (38°C) or higher  Cloudy or smelly drainage from the incision site  The skin around your incision is warm/red/swollen  Sudden increase in pain or new pain  Shaking chills  Fast pulse  Shortness of breath or chest pain  Nausea or vomiting.   Diarrhea  Constipation that isn't relieved in 3 days  Signs of a bladder infection (urinating more often than usual, burning while urinating, bleeding or hesitancy while urinating).  Any new or unexplained symptoms                                                                       Caring for a Closed Suction Drainage Tube  A drainage tube removes fluid from around an  incision. This helps prevent infection and promotes healing. The collection bulb at the end of the tube is squeezed and plugged to create suction. The bulb should be emptied and reset when half full to maintain adequate suction. You need to empty the bulb and clean the skin around the drain as often as your healthcare provider tells you to. Follow the steps below.     What you’ll need  Have the following items ready:  Disposable gloves  Measuring cup  Record sheet  Gauze or paper towel  Sterile cotton swabs or 4\" x 4\" gauze pads  Sterile saline or soap and water       Step 1. Empty the bulb  Wash your hands and put on a new pair of disposable gloves.  Point the top of the bulb away from you and remove the stopper.  Turn the bulb upside down over a measuring cup. Squeeze the fluid into the cup. Make sure the bulb is totally empty.  Put the cup to one side. You can record the volume of liquid in the cup after you clean and reconnect the bulb in step 2.    Step 2. Clean and reconnect the bulb  Clean the top of the bulb with clean gauze or a paper towel, if needed.  Squeeze the bulb tight, and put the stopper back on the top.  Record the amount of fluid in the cup. Then, empty the cup as directed.    Step 3. Clean the site  Remove your disposable gloves and wash your hands before cleaning the site.  Put on a new pair of disposable gloves.  Wet a sterile cotton swab or 4\" x 4\" gauze pad with sterile saline or soap and water.  Gently clean the skin around the drain. Always wipe away from the incision.  Apply an antibacterial ointment if directed.   When to call your healthcare provider  Call your healthcare provider if you notice any of these changes:  The amount of fluid increases or decreases suddenly  Large amount of blood or a clot in drainage  Color, odor, or thickness of the fluid changes  Tube falls out or the incision opens  Skin around the drain is red, swollen, painful, or seeping pus  You have a fever of  100.4°F (38°C) or higher, or as directed by your healthcare provider     If the tube isn't draining  Here are tips to drain the tube:  Uncurl any kinks in the tube.  With one hand, firmly hold the base of the tube between your thumb and index finger. Do not touch the incision.  Put the thumb and index finger of your other hand on the tube, next to the first hand. Pinch your fingers together. Then pull them along the tube toward the bag. This will help push any clogged fluid through the tube. This is called \"stripping the tube.\" You may find it helpful to hold an alcohol swab between your fingers and the tube to lubricate the tubing.  If the tube still does not drain, call your healthcare provider.       APPOINTMENT: 07/08 at 10 am in our office. [address], [place], [place].

## 2024-07-05 NOTE — PROGRESS NOTES
[facility]   part of [facility]     Hospitalist Progress Note     Stephy De Oliveira Patient Status:  Observation    1980 MRN [id number]   Location [facility] 4NW-A Attending Rush Mendez MD   Hosp Day # 10 PCP None Pcp     Chief Complaint: Abdominal pain    Subjective:     No pain currently, tolerating soft diet    Current medications:   enoxaparin  40 mg Subcutaneous Daily    pantoprazole  40 mg Intravenous QAM AC    Or    pantoprazole  40 mg Oral QAM AC       Objective:    Review of Systems:   10 system review completed.    Vital signs:  Temp:  [97.7 °F (36.5 °C)-98.3 °F (36.8 °C)] 98 °F (36.7 °C)  Pulse:  [73-91] 73  Resp:  [19-23] 23  BP: (100-113)/(65-78) 108/69  SpO2:  [94 %-97 %] 95 %  Patient Weight for the past 72 hrs:   Weight   24 1949 135 lb (61.2 kg)     Physical Exam:    General: No acute distress.   Respiratory: Decreased breath sounds   Cardiovascular: S1, S2. Regular rate and rhythm.   Abdomen: Soft BS+  Extremities: No edema.    Diagnostic Data:    Labs:  Recent Labs   Lab 24  1402 24  0732 24  1124 24  0553 24  0601 24  0512 24  0549   WBC  --    < > 11.4* 7.7 15.8* 8.9 8.0   HGB  --    < > 13.0 10.8* 13.9 11.7* 11.6*   MCV  --    < > 87.7 86.1 84.3 84.8 84.7   PLT  --    < > 89.0* 67.0* 98.0* 86.0* 103.0*   INR 1.05  --   --   --   --   --   --     < > = values in this interval not displayed.       Recent Labs   Lab 24  0601 24  0512 24  0549   GLU 95 94 93   BUN 6* 6* 8*   CREATSERUM 0.34* 0.41* 0.41*   CA 8.1* 8.0* 8.4*   ALB 2.1* 2.0* 2.2*    139 138   K 4.0  4.0 3.4* 3.7    106 106   CO2 24.0 28.0 26.0   ALKPHO 111* 90 95   * 72* 63*   * 213* 192*   BILT 1.9 1.6 1.2   TP 5.9* 5.5* 6.1*       Estimated Creatinine Clearance: 141.4 mL/min (A) (based on SCr of 0.41 mg/dL (L)).    Recent Labs   Lab 24  1402   PTP 13.7   INR 1.05            COVID-19 Lab Results    COVID-19  No  results found for: \"COVID19\"    Pro-Calcitonin  No results for input(s): \"PCT\" in the last 168 hours.    Cardiac  No results for input(s): \"TROP\", \"PBNP\" in the last 168 hours.    Creatinine Kinase  No results for input(s): \"CK\" in the last 168 hours.    Inflammatory Markers  No results for input(s): \"CRP\", \"DENIS\", \"LDH\", \"DDIMER\" in the last 168 hours.    No results for input(s): \"TROP\", \"TROPHS\", \"CK\" in the last 168 hours.    Imaging: Imaging data reviewed in Epic.    Medications:    enoxaparin  40 mg Subcutaneous Daily    pantoprazole  40 mg Intravenous QAM AC    Or    pantoprazole  40 mg Oral QAM AC       Assessment & Plan:    #Abdominal pain with obstructive jaundice d/t choledocholithiasis sp EUS/ERCP with biliary sphincterotomy, balloon stone extraction and PD stent placement 6/26 sp lap khalif with IOC 6/26, biliary leak 6/27 sp ERCP which noted biliary leak from cystic duct, but unable to have biliary stent placed d/t mid common bile duct obstruction possibly d/t CBD clip, s/p Exploratory lap, eze-en-y hepaticojejunostomy, intraoperative US, omental pedicle flap  -tolerating diet  -Abx completed  -PCA off, on oral pain meds     #TCP  -Monitor counts    Dc in next 24 hours    Supplementary Documentation:   Quality:  DVT Prophylaxis: Lovenox - hold     At this point Ms. De Oliveira is expected to be discharge to: Home    Plan of care discussed with patient, family and RN,    Katrina Reddy M.D.  [facility]

## 2024-07-05 NOTE — PHYSICAL THERAPY NOTE
PHYSICAL THERAPY TREATMENT NOTE - INPATIENT    Room Number: 7606/7606-A     Session: 1     Number of Visits to Meet Established Goals: 3    Presenting Problem: abdominal pain with obstructive jaundice, gallstones, s/p below surgeries  Co-Morbidities : none    ASSESSMENT     Patient is currently functioning near baseline with bed mobility, transfers, and gait.    Patient currently does not meet criteria for skilled inpatient physical therapy services, however patient will continue to benefit from QID ambulation with nursing staff.  Pt is discharged from IP PT services.  RN aware to re-order if there is a decline in mobility status.      PLAN  PT Treatment Plan: Bed mobility;Body mechanics;Endurance;Energy conservation;Patient education;Family education;Gait training;Balance training;Transfer training;Strengthening;Stair training  Rehab Potential : Good  Frequency (Obs): 3x/week    CURRENT GOALS     Goal #1 Patient is able to demonstrate supine - sit EOB @ level: modified independent      Goal #2 Patient is able to demonstrate transfers EOB to/from Chair/Wheelchair at assistance level: modified independent      Goal #3 Patient is able to ambulate 150 feet with assist device: none at assistance level: supervision      Goal #4 Pt is able to ascend and descend flight of stairs with supervision   Goal #5     Goal #6     Goal Comments: Goals established on 2024 all goals achieved    SUBJECTIVE  \"I am fine\" - referring to pain level    OBJECTIVE  Precautions: Abdominal protective strategies;Drain(s); needed (Nicaraguan)    WEIGHT BEARING RESTRICTION  Weight Bearing Restriction: None                PAIN ASSESSMENT   Ratin          BALANCE                                                                                                                       Static Sitting: Fair +  Dynamic Sitting: Fair +           Static Standing: Fair -  Dynamic Standing: Poor +    ACTIVITY TOLERANCE                          O2 WALK         AM-PAC '6-Clicks' INPATIENT SHORT FORM - BASIC MOBILITY  How much difficulty does the patient currently have...  Patient Difficulty: Turning over in bed (including adjusting bedclothes, sheets and blankets)?: A Little   Patient Difficulty: Sitting down on and standing up from a chair with arms (e.g., wheelchair, bedside commode, etc.): A Little   Patient Difficulty: Moving from lying on back to sitting on the side of the bed?: A Little   How much help from another person does the patient currently need...   Help from Another: Moving to and from a bed to a chair (including a wheelchair)?: A Little   Help from Another: Need to walk in hospital room?: A Little   Help from Another: Climbing 3-5 steps with a railing?: A Little       AM-PAC Score:  Raw Score: 18   Approx Degree of Impairment: 46.58%   Standardized Score (AM-PAC Scale): 43.63   CMS Modifier (G-Code): CK    FUNCTIONAL ABILITY STATUS  Gait Assessment   Functional Mobility/Gait Assessment  Gait Assistance: Supervision  Distance (ft): 300  Assistive Device: None  Pattern:  (forward flexed, dec gait)  Stairs: Stairs  How Many Stairs: 4  Device: 1 Rail  Assist: Supervision  Pattern: Ascend and Descend  Ascend and Descend : Step to    Skilled Therapy Provided    Pt up at lulu in room    Therapist's Comments: NA      THERAPEUTIC EXERCISES  Lower Extremity Alternating marching  Ankle pumps     Upper Extremity  - open/close     Position Sitting     Repetitions   10   Sets   1     Patient End of Session: Up in chair;Needs met;Call light within reach;RN aware of session/findings;All patient questions and concerns addressed;Family present    PT Session Time: 10 minutes  Gait Trainin minutes  Therapeutic Activity: 0 minutes  Therapeutic Exercise: 0 minutes   Neuromuscular Re-education: 0 minutes

## 2024-07-05 NOTE — OCCUPATIONAL THERAPY NOTE
OCCUPATIONAL THERAPY EVALUATION - INPATIENT    Room Number: 7606/7606-A  Evaluation Date: 7/5/2024     Type of Evaluation: Initial  Presenting Problem: elevated liver enzymes, gallstones; S/p EGD/EUC 6/25   S/p ERCP with biliary sphincterectomy, balloon stone extraction and PD stent placement 6/25   S/p laparoscopic cholecystectomy with intraoperative cholangiogram and ICG 6/26   S/p ERCP 6/27  S/p expl lap, reoux-en0y hepaticojejunostomy, intraoperative ultrasound, omental pedicle flap 7/2 for bile leak    Physician Order: IP Consult to Occupational Therapy  Reason for Therapy:  ADL/IADL Dysfunction and Discharge Planning      OCCUPATIONAL THERAPY ASSESSMENT   Patient is a 44 year old female admitted on 6/24/2024 with Presenting Problem: elevated liver enzymes, gallstones; S/p EGD/EUC 6/25   S/p ERCP with biliary sphincterectomy, balloon stone extraction and PD stent placement 6/25   S/p laparoscopic cholecystectomy with intraoperative cholangiogram and ICG 6/26   S/p ERCP 6/27  S/p expl lap, reoux-en0y hepaticojejunostomy, intraoperative ultrasound, omental pedicle flap 7/2 for bile leak. Co-Morbidities : n/a  Patient is functioning at mod I to supervision level for basic self care and functional mobility.   Demonstrates good understanding regarding precautions.  Patient has good family support and good set-up at home.  No further skilled OT intervention warranted at this time.    Patient will be discharged from OT services at this time.  Will benefit from home at discharge from hospital.      WEIGHT BEARING RESTRICTION  Weight Bearing Restriction: None                Recommendations for nursing staff:   Transfers: supervision  Toileting location: Toilet    EVALUATION SESSION:  Patient at start of session: supine  FUNCTIONAL TRANSFER ASSESSMENT  Sit to Stand: Edge of Bed; Chair  Edge of Bed: Modified Independent  Chair: Modified Independent  Toilet Transfer: Modified Independent    BED MOBILITY  Rolling:  Modified Independent  Supine to Sit : Modified Independent  Sit to Supine (OT): Modified Independent  Scooting: mod I    BALANCE ASSESSMENT  Static Sitting: Modified Independent  Static Standing: Modified Independent    FUNCTIONAL ADL ASSESSMENT  Eating: Modified Independent  Grooming Seated: Modified Independent  LB Dressing Seated: Modified Independent  Toileting Seated: Modified Independent      ACTIVITY TOLERANCE: WFL                         O2 SATURATIONS       COGNITION  Overall WFL  COGNITION ASSESSMENTS       Upper Extremity:   ROM: within functional limits   Strength: is within functional limits   Coordination:  Gross motor: WFL  Fine motor: wFL  Sensation: light touch intact BUE    EDUCATION PROVIDED  Patient: Role of Occupational Therapy; Plan of Care; Discharge Recommendations; Adaptive Equipment Recommendations; Functional Transfer Techniques; Fall Prevention; Surgical Precautions; Posture/Positioning; Energy Conservation; Compensatory ADL Techniques; Proper Body Mechanics; Abdominal Protective Strategies  Patient's Response to Education: Verbalized Understanding; Returned Demonstration  Family/Caregiver: Role of Occupational Therapy; Plan of Care; Discharge Recommendations  Family/Caregiver's Response to Education: Verbalized Understanding    Equipment used: none  Demonstrates functional use    Therapist comments: Patient instructed in abdominal protective strategies during functional transfers and self-care tasks.  Instructed in use of modified techniques to complete LB dressing and bathing.  Patient demonstrated good understanding.      Patient End of Session: Up in chair;With  staff;Needs met;Call light within reach;RN aware of session/findings;All patient questions and concerns addressed;Discussed recommendations with /    OCCUPATIONAL PROFILE    HOME SITUATION  Type of Home: House  Home Layout: Two level  Lives With: Spouse    Toilet and Equipment: Standard height  toilet  Shower/Tub and Equipment: Walk-in shower  Other Equipment: None       Hand Dominance: Right  Drives: Yes  Patient Regularly Uses: None    Prior Level of Function: independent with ADL/IADLs without use of adaptive device.  Patient with no h/o falls.    SUBJECTIVE  PAIN ASSESSMENT  Ratin  Location: abd  Management Techniques: Relaxation;Activity promotion;Repositioning    OBJECTIVE  Precautions: Abdominal protective strategies;Drain(s); needed (Ukrainian)  Fall Risk: Standard fall risk    WEIGHT BEARING RESTRICTION  Weight Bearing Restriction: None                AM-PAC ‘6-Clicks’ Inpatient Daily Activity Short Form  -   Putting on and taking off regular lower body clothing?: None  -   Bathing (including washing, rinsing, drying)?: None  -   Toileting, which includes using toilet, bedpan or urinal? : None  -   Putting on and taking off regular upper body clothing?: None  -   Taking care of personal grooming such as brushing teeth?: None  -   Eating meals?: None    AM-PAC Score:  Score: 24  Approx Degree of Impairment: 0%  Standardized Score (AM-PAC Scale): 57.54      ADDITIONAL TESTS     NEUROLOGICAL FINDINGS        PLAN   Patient has been evaluated and presents with no skilled Occupational Therapy needs at this time.  Patient discharged from Occupational Therapy services.  Please re-order if a new functional limitation presents during this admission.      Patient Evaluation Complexity Level:   Occupational Profile/Medical History LOW - Brief history including review of medical or therapy records    Specific performance deficits impacting engagement in ADL/IADL LOW  1 - 3 performance deficits    Client Assessment/Performance Deficits LOW - No comorbidities nor modifications of tasks    Clinical Decision Making LOW - Analysis of occupational profile, problem-focused assessments, limited treatment options    Overall Complexity LOW     OT Session Time: 30 minutes  Self-Care Home Management: 10  minutes

## 2024-07-05 NOTE — PLAN OF CARE
Assumed care.   used to discuss POC and answer any questions. All questions answered with . Family used at bedside for simple requests. Will await K+ recheck as patient had difficulty completing the po replacement. Some moderate drainage noted to bottom of midline dressing. Drains emptied x1 overnight. Pain controlled. Patient needed x1 dose Toradol overnight, now resting comfortably. Family at bedside. All needs addressed.

## 2024-07-06 NOTE — PLAN OF CARE
Assumed care 0730  A+Ox4, RA, SR on tele  Medically clear for discharge  AVS reviewed, all questions answered   - line used   [facility]        NURSING DISCHARGE NOTE    Discharged Home via Wheelchair.  Accompanied by Family member and Support staff  Belongings Taken by patient/family.

## 2024-07-08 ENCOUNTER — OFFICE VISIT (OUTPATIENT)
Dept: HEMATOLOGY/ONCOLOGY | Facility: HOSPITAL | Age: 44
End: 2024-07-08
Payer: COMMERCIAL

## 2024-07-08 ENCOUNTER — OFFICE VISIT (OUTPATIENT)
Dept: SURGERY | Facility: CLINIC | Age: 44
End: 2024-07-08
Payer: COMMERCIAL

## 2024-07-08 ENCOUNTER — TELEPHONE (OUTPATIENT)
Facility: LOCATION | Age: 44
End: 2024-07-08

## 2024-07-08 ENCOUNTER — PATIENT OUTREACH (OUTPATIENT)
Dept: CASE MANAGEMENT | Age: 44
End: 2024-07-08

## 2024-07-08 VITALS
RESPIRATION RATE: 20 BRPM | SYSTOLIC BLOOD PRESSURE: 87 MMHG | HEART RATE: 88 BPM | DIASTOLIC BLOOD PRESSURE: 59 MMHG | BODY MASS INDEX: 24 KG/M2 | WEIGHT: 130.63 LBS | TEMPERATURE: 98 F

## 2024-07-08 VITALS — SYSTOLIC BLOOD PRESSURE: 104 MMHG | DIASTOLIC BLOOD PRESSURE: 66 MMHG | HEART RATE: 65 BPM

## 2024-07-08 DIAGNOSIS — K83.9 BILE LEAK: Primary | ICD-10-CM

## 2024-07-08 DIAGNOSIS — I95.89 OTHER SPECIFIED HYPOTENSION: Primary | ICD-10-CM

## 2024-07-08 PROBLEM — I95.9 HYPOTENSION: Status: ACTIVE | Noted: 2024-07-08

## 2024-07-08 LAB
ALBUMIN SERPL-MCNC: 2.8 G/DL (ref 3.4–5)
ALBUMIN/GLOB SERPL: 0.7 {RATIO} (ref 1–2)
ALP LIVER SERPL-CCNC: 101 U/L
ALT SERPL-CCNC: 166 U/L
ANION GAP SERPL CALC-SCNC: 8 MMOL/L (ref 0–18)
AST SERPL-CCNC: 64 U/L (ref 15–37)
BASOPHILS # BLD AUTO: 0.06 X10(3) UL (ref 0–0.2)
BASOPHILS NFR BLD AUTO: 0.8 %
BILIRUB SERPL-MCNC: 1.2 MG/DL (ref 0.1–2)
BUN BLD-MCNC: 5 MG/DL (ref 9–23)
CALCIUM BLD-MCNC: 8.8 MG/DL (ref 8.5–10.1)
CHLORIDE SERPL-SCNC: 107 MMOL/L (ref 98–112)
CO2 SERPL-SCNC: 24 MMOL/L (ref 21–32)
CREAT BLD-MCNC: 0.59 MG/DL
EGFRCR SERPLBLD CKD-EPI 2021: 115 ML/MIN/1.73M2 (ref 60–?)
EOSINOPHIL # BLD AUTO: 0.23 X10(3) UL (ref 0–0.7)
EOSINOPHIL NFR BLD AUTO: 3.1 %
ERYTHROCYTE [DISTWIDTH] IN BLOOD BY AUTOMATED COUNT: 13 %
GLOBULIN PLAS-MCNC: 4 G/DL (ref 2.8–4.4)
GLUCOSE BLD-MCNC: 99 MG/DL (ref 70–99)
HCT VFR BLD AUTO: 37.9 %
HGB BLD-MCNC: 12.9 G/DL
IMM GRANULOCYTES # BLD AUTO: 0.09 X10(3) UL (ref 0–1)
IMM GRANULOCYTES NFR BLD: 1.2 %
LYMPHOCYTES # BLD AUTO: 1.32 X10(3) UL (ref 1–4)
LYMPHOCYTES NFR BLD AUTO: 18 %
MCH RBC QN AUTO: 29.3 PG (ref 26–34)
MCHC RBC AUTO-ENTMCNC: 34 G/DL (ref 31–37)
MCV RBC AUTO: 85.9 FL
MONOCYTES # BLD AUTO: 0.67 X10(3) UL (ref 0.1–1)
MONOCYTES NFR BLD AUTO: 9.2 %
NEUTROPHILS # BLD AUTO: 4.95 X10 (3) UL (ref 1.5–7.7)
NEUTROPHILS # BLD AUTO: 4.95 X10(3) UL (ref 1.5–7.7)
NEUTROPHILS NFR BLD AUTO: 67.7 %
OSMOLALITY SERPL CALC.SUM OF ELEC: 285 MOSM/KG (ref 275–295)
PLATELET # BLD AUTO: 154 10(3)UL (ref 150–450)
POTASSIUM SERPL-SCNC: 4.3 MMOL/L (ref 3.5–5.1)
PROT SERPL-MCNC: 6.8 G/DL (ref 6.4–8.2)
RBC # BLD AUTO: 4.41 X10(6)UL
SODIUM SERPL-SCNC: 139 MMOL/L (ref 136–145)
WBC # BLD AUTO: 7.3 X10(3) UL (ref 4–11)

## 2024-07-08 PROCEDURE — 80053 COMPREHEN METABOLIC PANEL: CPT

## 2024-07-08 PROCEDURE — 99024 POSTOP FOLLOW-UP VISIT: CPT

## 2024-07-08 PROCEDURE — 85025 COMPLETE CBC W/AUTO DIFF WBC: CPT

## 2024-07-08 PROCEDURE — 96360 HYDRATION IV INFUSION INIT: CPT

## 2024-07-08 PROCEDURE — 3074F SYST BP LT 130 MM HG: CPT

## 2024-07-08 PROCEDURE — 3078F DIAST BP <80 MM HG: CPT

## 2024-07-08 NOTE — DISCHARGE SUMMARY
[place] HOSPITALIST  DISCHARGE SUMMARY     Stephy De Oliveira Patient Status:  Inpatient    1980 MRN [id number]   Location [facility] 7NE-A Attending No att. providers found   Hosp Day # 10 PCP None Pcp     Date of Admission: 2024  Date of Discharge:  2024     Discharge Disposition: Home or Self Care    Discharge Diagnosis:  Penny toco lithiasis  Biliary leak from cystic duct      History of Present Illness:   Stephy De Oliveira is a 44 year old female with no significant past medical history comes to the ER with complaints of abdominal pain and jaundice.  She states that it has been ongoing for the last 3 days.  Her pain is located in her mid abdomen radiating to her right upper quadrant.  She states that she has associated nausea but no vomiting.  She denies any fever, chills, chest pain or shortness of breath.     Brief Synopsis:   Patient is a 44-year-old female admitted with abdominal pain.  She is noted to have obstructive jaundice secondary to choledocholithiasis.  She underwent a EUS and ERCP with biliary sphincterotomy and balloon stone extraction PD stent placement on May 26.  She subsequently underwent a laparoscopic cholecystectomy with intraoperative cholangiogram and May 26 this was complicated by biliary leak that was noted on 627 and she subsequently underwent a ERCP.  She was unable to have a biliary stent placed due to mid common bile duct obstruction and possibly of a CBD stent clip.  She underwent a X Bloor Luttrell laparotomy Alejandra-en-Y hepatojejunostomy and intraoperative ultrasound and omental pedicle flap.  She did make a good recovery.  She had controlled pain and was tolerating a diet at the time of discharge.    Lace+ Score: 48  59-90 High Risk  29-58 Medium Risk  0-28   Low Risk  Patient was referred to the [facility].    TCM Follow-Up Recommendation:  LACE 29-58: Moderate Risk of readmission after discharge from the hospital.      Procedures during  hospitalization:   EUS/ERCP with biliary sphincterotomy, balloon stone extraction and PD stent placement 6/26   lap khalif with IOC 6/26   ERCP 6/27  Exploratory lap, eze-en-y hepaticojejunostomy, intraoperative US, omental pedicle flap     Consultants:  General Surgery  GI  Hematology  Surgical oncology    Discharge Medication List:     Discharge Medications        START taking these medications        Instructions Prescription details   oxyCODONE 5 MG Tabs      Take 1 tablet (5 mg total) by mouth every 4 (four) hours as needed.   Quantity: 30 tablet  Refills: 0            CONTINUE taking these medications        Instructions Prescription details   pantoprazole 20 MG Tbec  Commonly known as: Protonix      Take 1 tablet (20 mg total) by mouth every morning before breakfast.   Refills: 0               Where to Get Your Medications        These medications were sent to [facility] - [address], [facility] [address] [phone], [phone]  [address] [address], [facility] [address]      Phone: [phone]   oxyCODONE 5 MG Tabs         [place]    Follow-up appointment:   Pcp, None  [facility] [address]    Follow up      Appointments for Next 30 Days 7/8/2024 - 8/7/2024        Date Arrival Time Visit Type Length Department Provider     7/15/2024  1:30 PM  CLINICAL POST OP VISIT [4050] 30 min [facility] [place], [facility]    Patient Instructions:         Location Instructions:     Your appointment is with [facility]. The address is [address] [place] [address] [address] [facility], [place] [id number].   Masks are optional for all patients and visitors, unless otherwise indicated.                      Vital signs:       Physical Exam:    General: No acute distress   Lungs: clear to auscultation  Cardiovascular: S1, S2  Abdomen:  Soft      -----------------------------------------------------------------------------------------------  PATIENT DISCHARGE INSTRUCTIONS: See electronic chart    Katrina Reddy MD    Total time spent on discharge plannin minutes     The  [employer] Cures Act makes medical notes like these available to patients in the interest of transparency. Please be advised this is a medical document. Medical documents are intended to carry relevant information, facts as evident, and the clinical opinion of the practitioner. The medical note is intended as peer to peer communication and may appear blunt or direct. It is written in medical language and may contain abbreviations or verbiage that are unfamiliar.

## 2024-07-08 NOTE — PROGRESS NOTES
Pt arrived amb for IVF.  Given per order.  Pt mckinley well.  Post IVF VSS, see flowsheet.  Pt left amb without c/o.    Education Record    Learner:  Patient    Disease / Diagnosis: hypotension    Barriers / Limitations:  None    Method:  discussion    General Topics:  Plan of care reviewed    Outcome:  Shows understanding

## 2024-07-08 NOTE — PROGRESS NOTES
[facility]      Patient Name:  Stephy De Oliveira   YOB: 1980   Gender:  Female   Appt Date:  7/8/2024   Provider:  FLAVIA Rowell     PATIENT PROVIDERS  Inpatient Follow Up       CHIEF COMPLAINT  Chief Complaint   Patient presents with    Post-Op        PROBLEMS  Reviewed   Patient Active Problem List   Diagnosis    Elevated liver enzymes    Elevated bilirubin    Common bile duct (CBD) obstruction (HCC)    Calculus of gallbladder with acute cholecystitis and obstruction    Choledocholithiasis    Thrombocytopenia (HCC)    Bile leak    Hypotension        History of Present Illness:  Bile Leak    Patient presented to the ED on 06/25 with abdominal pain and jaundice. Upon arrival to hospital patient was afebrile and labs revealed WBC 9.1 hemoglobin 15.1 platelets 113,000.  Alkaline phosphatase 163   total bilirubin 8.0.  Lipase 33. Patient had elevated LFTs and CT abdomen and pelvis done which showed cholelithiasis and biliary sludge extending into the common bile duct with moderate upstream biliary dilation.  The common bile duct measures up to 9 millimeters.      Then Sabino Sheriff performed a EUS which revealed the common bile duct was dilated with multiple echogenic filling defects with shadowing. He preceded with an ERCP, biliary sphincterotomy, and balloon stone extraction  which showed the CBD and the CHD were dilated with multiple filling defects cw choledocholithiasis, the confluence of the right and left hepatic ducts and the intrahepatics appear wnl, the cystic duct and the gallbladder were not visualized.      Dr. Pereyra performed a laparoscopic cholecystectomy with intraoperative cholangiogram.      GI ordered hida scan: Patient was seen by surgery service with notes of bilious output in STACIE drain. Stat HIDA scan with radiotracer extravasation accumulating in the gallbladder fossa, c/w bile leak with extensive subcutaneous emphysema overlying the chest  wall and abdominal wall extending into the right upper extremity.      Biliary stent placement with Dr. Desai: Unable to place biliary stent due to mid-common bile duct obstruction. This may be due to a CBD clip proximal to the cystic duct. A bile leak is present eminating from the cystic duct.      Patient is s/p laparoscopic cholecystectomy on 6/26/2024, positive HIDA scan for biliary leak. Dr. Desai performed an ERCP to place a biliary stent due to elevated bilirubin but was unable to place stent due to mid-common bile duct obstruction. Abdominal drain continued to have bile concerning for leak.     7/2/2024: s/p Exploratory laparotomy, eze-en-y hepaticojejunostomy, intraoperative ultrasound, omental pedicle flap     Patient has been feeling well since discharge. She is not taking any pain medications. She denies nausea or vomiting. She has been tolerating a diet and drinking fluids, but feels her appetite is slightly decreased. Her drain output has remained low, 20-30 mL per day. She denies lightheadedness or dizziness, but feels like her hear rate is elevated.      Vital Signs:  BP (!) 87/59 (BP Location: Left arm, Patient Position: Sitting, Cuff Size: adult)   Pulse 88   Temp 97.7 °F (36.5 °C) (Temporal)   Resp 20   Wt 59.2 kg (130 lb 9.6 oz)   LMP 05/30/2024   BMI 23.89 kg/m²      Medications Reviewed:    Current Outpatient Medications:     oxyCODONE 5 MG Oral Tab, Take 1 tablet (5 mg total) by mouth every 4 (four) hours as needed., Disp: 30 tablet, Rfl: 0    pantoprazole 20 MG Oral Tab EC, Take 1 tablet (20 mg total) by mouth every morning before breakfast., Disp: , Rfl:      Allergies Reviewed:  Allergies   Allergen Reactions    Advil [Ibuprofen] ITCHING        History:  Reviewed:  Past Medical History:    Ovarian cyst      Reviewed:  History reviewed. No pertinent surgical history.   Reviewed Social History:  Social History     Socioeconomic History    Marital status:    Tobacco Use     Smoking status: Never    Smokeless tobacco: Never   Substance and Sexual Activity    Alcohol use: Yes     Comment: socially    Drug use: Never     Social Determinants of Health     Food Insecurity: No Food Insecurity (6/25/2024)    Food Insecurity     Food Insecurity: Never true   Transportation Needs: No Transportation Needs (6/25/2024)    Transportation Needs     Lack of Transportation: No   Housing Stability: Low Risk  (6/25/2024)    Housing Stability     Housing Instability: No      Reviewed:  History reviewed. No pertinent family history.   Review of Systems:  Doing well post-operatively  Denies fever or chills  Denies chest pain or SOB  Denies abdominal pain or N/V  Denies lightheadedness or dizziness  Denies warmth, erythema, or drainage at incision       Physical Examination:  Constitutional:  NAD.   Eyes: non-icteric.    Abdomen: Soft, non-tender, non-distended. Incision healing well without drainage or erythema. Staples in place. STACIE x2 with bile tinged SS output, lower > upper.   Musculoskeletal: no edema.     Document Review:  Lab Results   Component Value Date    WBC 7.3 07/08/2024    HGB 12.9 07/08/2024    HCT 37.9 07/08/2024    .0 07/08/2024        Procedure(s):  None     Assessment / Plan:  Bile Leak s/p Cholecystectomy  - No acute surgical issues  - Incision site healing well. Staples due for removal next week.  - Drain output improving. Progressing to more SS as opposed to bilious prior. Output continues to decrease as well. Will keep drains at this time until output has no bile.   - Hypotensive in office with decreased PO intake. Sent to cancer center for IVF.   - CBC within normal limits. CMP stable.  - Discussed increasing PO intake with small meals throughout the day and oral hydration.   - Pain controlled without pain medication  - Reviewed signs and symptoms to return to office      Follow Up:  1 week for staple removal       Electronically Signed by: FLAVIA Rowell

## 2024-07-08 NOTE — TELEPHONE ENCOUNTER
Stephy was discharged over holiday weekend on Friday July 5.  I called today using Croatian   First call with with  ID #[id number].  The  could not call out as  line systems were down.  I called back 2 hours later using Croatian  ID #[id number].  We called Stephy at [phone].  The call went to voicemail.   The  did leave message stating \"Dr Pereyra called to see how Stephy was doing and also to make sure she was not having any issues after discharge.\"  We did leave office contact number on voice mail.      LITA Pereyra MD, FACS

## 2024-07-08 NOTE — PROGRESS NOTES
LM for pt to call [facility] for TCM since discharge. NCM phone number was provided for pt to call back.    [facility] contact information was provided for pt to call back as well.

## 2024-07-09 NOTE — PAYOR COMM NOTE
--------------  DISCHARGE REVIEW    Payor: JT OUT OF STATE POS/MCNP  Subscriber #:  [id number]  Authorization Number: [id number]    Admit date: 24  Admit time:   2:12 PM  Discharge Date: 2024  7:56 PM       [facility]     Stephy De Oliveira Patient Status:  Inpatient    1980 MRN [id number]   Location [facility] 7NE-A Attending No att. providers found   Hosp Day # 10 PCP None Pcp     Date of Admission: 2024  Date of Discharge:  2024     Discharge Disposition: Home or Self Care    Discharge Diagnosis:  Penny toco lithiasis  Biliary leak from cystic duct      History of Present Illness:   Stephy De Oliveira is a 44 year old female with no significant past medical history comes to the ER with complaints of abdominal pain and jaundice.  She states that it has been ongoing for the last 3 days.  Her pain is located in her mid abdomen radiating to her right upper quadrant.  She states that she has associated nausea but no vomiting.  She denies any fever, chills, chest pain or shortness of breath.     Brief Synopsis:   Patient is a 44-year-old female admitted with abdominal pain.  She is noted to have obstructive jaundice secondary to choledocholithiasis.  She underwent a EUS and ERCP with biliary sphincterotomy and balloon stone extraction PD stent placement on May 26.  She subsequently underwent a laparoscopic cholecystectomy with intraoperative cholangiogram and May 26 this was complicated by biliary leak that was noted on 627 and she subsequently underwent a ERCP.  She was unable to have a biliary stent placed due to mid common bile duct obstruction and possibly of a CBD stent clip.  She underwent a X Bloor Stephane laparotomy Alejandra-en-Y hepatojejunostomy and intraoperative ultrasound and omental pedicle flap.  She did make a good recovery.  She had controlled pain and was tolerating a diet at the time of discharge.    Lace+ Score: 48  59-90 High Risk  29-58 Medium  Risk  0-28   Low Risk  Patient was referred to the [facility].    TCM Follow-Up Recommendation:  LACE 29-58: Moderate Risk of readmission after discharge from the hospital.      Procedures during hospitalization:   EUS/ERCP with biliary sphincterotomy, balloon stone extraction and PD stent placement 6/26   lap khalif with IOC 6/26   ERCP 6/27  Exploratory lap, eze-en-y hepaticojejunostomy, intraoperative US, omental pedicle flap     Consultants:  General Surgery  GI  Hematology  Surgical oncology    Discharge Medication List:     Discharge Medications        START taking these medications        Instructions Prescription details   oxyCODONE 5 MG Tabs      Take 1 tablet (5 mg total) by mouth every 4 (four) hours as needed.   Quantity: 30 tablet  Refills: 0            CONTINUE taking these medications        Instructions Prescription details   pantoprazole 20 MG Tbec  Commonly known as: Protonix      Take 1 tablet (20 mg total) by mouth every morning before breakfast.   Refills: 0               Where to Get Your Medications        These medications were sent to [facility] [place] [id number] [phone], [phone]  [address] [place] [address], [facility] [address]      Phone: [phone]   oxyCODONE 5 MG Tabs         [place]    Follow-up appointment:   Pcp, None  [place] [phone]    Follow up      Appointments for Next 30 Days 7/8/2024 - 8/7/2024        Date Arrival Time Visit Type Length Department Provider     7/15/2024  1:30 PM  CLINICAL POST OP VISIT [4050] 30 min [facility] Sarah Reese PA    Patient Instructions:         Location Instructions:     Your appointment is with [facility]. The address is [address] [place] [address] [address], [place] [phone].   Masks are optional for all patients and visitors, unless otherwise indicated.                      Vital signs:        Physical Exam:    General: No acute distress   Lungs: clear to auscultation  Cardiovascular: S1, S2  Abdomen: Soft      -----------------------------------------------------------------------------------------------  PATIENT DISCHARGE INSTRUCTIONS: See electronic chart    Katrina Reddy MD    Total time spent on discharge plannin minutes     The  [employer] Cures Act makes medical notes like these available to patients in the interest of transparency. Please be advised this is a medical document. Medical documents are intended to carry relevant information, facts as evident, and the clinical opinion of the practitioner. The medical note is intended as peer to peer communication and may appear blunt or direct. It is written in medical language and may contain abbreviations or verbiage that are unfamiliar.       Electronically signed by Katrina Reddy MD on 2024  1:18 PM         REVIEWER COMMENTS

## 2024-07-10 ENCOUNTER — PATIENT OUTREACH (OUTPATIENT)
Dept: CASE MANAGEMENT | Age: 44
End: 2024-07-10

## 2024-07-15 ENCOUNTER — OFFICE VISIT (OUTPATIENT)
Dept: SURGERY | Facility: CLINIC | Age: 44
End: 2024-07-15
Payer: COMMERCIAL

## 2024-07-15 VITALS
BODY MASS INDEX: 24 KG/M2 | TEMPERATURE: 98 F | HEART RATE: 73 BPM | DIASTOLIC BLOOD PRESSURE: 54 MMHG | WEIGHT: 131 LBS | SYSTOLIC BLOOD PRESSURE: 88 MMHG | RESPIRATION RATE: 20 BRPM

## 2024-07-15 DIAGNOSIS — Z00.00 ROUTINE GENERAL MEDICAL EXAMINATION AT HEALTH CARE FACILITY: ICD-10-CM

## 2024-07-15 DIAGNOSIS — I95.9 HYPOTENSION, UNSPECIFIED HYPOTENSION TYPE: ICD-10-CM

## 2024-07-15 DIAGNOSIS — K83.9 BILE LEAK: Primary | ICD-10-CM

## 2024-07-15 LAB
ALBUMIN SERPL-MCNC: 3 G/DL (ref 3.4–5)
ALBUMIN/GLOB SERPL: 0.8 {RATIO} (ref 1–2)
ALP LIVER SERPL-CCNC: 79 U/L
ALT SERPL-CCNC: 67 U/L
ANION GAP SERPL CALC-SCNC: 6 MMOL/L (ref 0–18)
AST SERPL-CCNC: 29 U/L (ref 15–37)
BILIRUB SERPL-MCNC: 0.8 MG/DL (ref 0.1–2)
BUN BLD-MCNC: 7 MG/DL (ref 9–23)
CALCIUM BLD-MCNC: 9 MG/DL (ref 8.5–10.1)
CHLORIDE SERPL-SCNC: 106 MMOL/L (ref 98–112)
CO2 SERPL-SCNC: 29 MMOL/L (ref 21–32)
CREAT BLD-MCNC: 0.61 MG/DL
EGFRCR SERPLBLD CKD-EPI 2021: 114 ML/MIN/1.73M2 (ref 60–?)
GLOBULIN PLAS-MCNC: 3.9 G/DL (ref 2.8–4.4)
GLUCOSE BLD-MCNC: 97 MG/DL (ref 70–99)
OSMOLALITY SERPL CALC.SUM OF ELEC: 290 MOSM/KG (ref 275–295)
POTASSIUM SERPL-SCNC: 3.9 MMOL/L (ref 3.5–5.1)
PROT SERPL-MCNC: 6.9 G/DL (ref 6.4–8.2)
SODIUM SERPL-SCNC: 141 MMOL/L (ref 136–145)

## 2024-07-15 PROCEDURE — 80053 COMPREHEN METABOLIC PANEL: CPT

## 2024-07-15 NOTE — PROGRESS NOTES
[facility]      Patient Name:  Stephy De Oliveira   YOB: 1980   Gender:  Female   Appt Date:  7/15/2024   Provider:  FLAVIA Rowell     PATIENT PROVIDERS  Inpatient Follow Up       CHIEF COMPLAINT  Bile leak follow up       PROBLEMS  Reviewed   Patient Active Problem List   Diagnosis    Elevated liver enzymes    Elevated bilirubin    Common bile duct (CBD) obstruction (HCC)    Calculus of gallbladder with acute cholecystitis and obstruction    Choledocholithiasis    Thrombocytopenia (HCC)    Bile leak    Hypotension        History of Present Illness:  Bile Leak    Patient presented to the ED on 06/25 with abdominal pain and jaundice. Upon arrival to hospital patient was afebrile and labs revealed WBC 9.1 hemoglobin 15.1 platelets 113,000.  Alkaline phosphatase 163   total bilirubin 8.0.  Lipase 33. Patient had elevated LFTs and CT abdomen and pelvis done which showed cholelithiasis and biliary sludge extending into the common bile duct with moderate upstream biliary dilation.  The common bile duct measures up to 9 millimeters.      Then Sabino Sheriff performed a EUS which revealed the common bile duct was dilated with multiple echogenic filling defects with shadowing. He preceded with an ERCP, biliary sphincterotomy, and balloon stone extraction  which showed the CBD and the CHD were dilated with multiple filling defects cw choledocholithiasis, the confluence of the right and left hepatic ducts and the intrahepatics appear wnl, the cystic duct and the gallbladder were not visualized.      Dr. Pereyra performed a laparoscopic cholecystectomy with intraoperative cholangiogram.      GI ordered hida scan: Patient was seen by surgery service with notes of bilious output in STACIE drain. Stat HIDA scan with radiotracer extravasation accumulating in the gallbladder fossa, c/w bile leak with extensive subcutaneous emphysema overlying the chest wall and abdominal wall  extending into the right upper extremity.      Biliary stent placement with Dr. Desai: Unable to place biliary stent due to mid-common bile duct obstruction. This may be due to a CBD clip proximal to the cystic duct. A bile leak is present eminating from the cystic duct.      Patient is s/p laparoscopic cholecystectomy on 6/26/2024, positive HIDA scan for biliary leak. Dr. Desai performed an ERCP to place a biliary stent due to elevated bilirubin but was unable to place stent due to mid-common bile duct obstruction. Abdominal drain continued to have bile concerning for leak.     7/2/2024: s/p Exploratory laparotomy, eze-en-y hepaticojejunostomy, intraoperative ultrasound, omental pedicle flap     Patient doing well post operatively. She is tolerating diet. Denies nausea or vomiting. She states the drain output is very minimal.      Vital Signs:  BP (!) 88/54 (BP Location: Right arm, Patient Position: Sitting, Cuff Size: adult)   Pulse 73   Temp 98.2 °F (36.8 °C) (Temporal)   Resp 20   Wt 59.4 kg (131 lb)   LMP 05/30/2024   BMI 23.96 kg/m²      Medications Reviewed:    Current Outpatient Medications:     oxyCODONE 5 MG Oral Tab, Take 1 tablet (5 mg total) by mouth every 4 (four) hours as needed., Disp: 30 tablet, Rfl: 0    pantoprazole 20 MG Oral Tab EC, Take 1 tablet (20 mg total) by mouth every morning before breakfast., Disp: , Rfl:      Allergies Reviewed:  Allergies   Allergen Reactions    Advil [Ibuprofen] ITCHING        History:  Reviewed:  Past Medical History:    Ovarian cyst      Reviewed:  History reviewed. No pertinent surgical history.   Reviewed Social History:  Social History     Socioeconomic History    Marital status:    Tobacco Use    Smoking status: Never    Smokeless tobacco: Never   Substance and Sexual Activity    Alcohol use: Yes     Comment: socially    Drug use: Never     Social Determinants of Health     Food Insecurity: No Food Insecurity (6/25/2024)    Food Insecurity      Food Insecurity: Never true   Transportation Needs: No Transportation Needs (6/25/2024)    Transportation Needs     Lack of Transportation: No   Housing Stability: Low Risk  (6/25/2024)    Housing Stability     Housing Instability: No      Reviewed:  History reviewed. No pertinent family history.   Review of Systems:  Doing well post-operatively  Denies fever or chills  Denies chest pain or SOB  Denies abdominal pain or N/V  Denies warmth, erythema, or drainage at incision       Physical Examination:  Constitutional:  NAD.   Eyes: non-icteric.    Abdomen: Soft, non-tender, non-distended. Incision healing well without drainage or erythema. Staples in place. Upper STACIE with minimal bilious drainage. Lower STACIE drain with SS output.   Musculoskeletal: no edema.     Document Review:  None     Procedure(s):  Lower STACIE drain removal  Staple removal     Assessment / Plan:  Bile Leak s/p Cholecystectomy  - No acute surgical issues  - Incision site healing well. Staples removed today. Lower drain removed today due to low output and no bile. Upper drain remains due to bilious output. Will remove once no bile is in drain.   - CMP repeated today, LFTs continue to improve/normalize.   - Discussed increasing PO intake with small meals throughout the day and oral hydration.   - Pain controlled without pain medication  - Reviewed signs and symptoms to return to office  - Patient continues to have low BP. She states this is normal for her. She does not check her BP at home. She denies lightheadedness or dizziness. She received IVF last week. Scheduled patient for PCP visit on August 5th. Reviewed signs and symptoms to call the office sooner.       Follow Up:  1 week for drain check       Electronically Signed by: FLAVIA Rowell

## 2024-07-22 ENCOUNTER — OFFICE VISIT (OUTPATIENT)
Dept: SURGERY | Facility: CLINIC | Age: 44
End: 2024-07-22
Payer: COMMERCIAL

## 2024-07-22 VITALS
TEMPERATURE: 98 F | HEART RATE: 70 BPM | SYSTOLIC BLOOD PRESSURE: 88 MMHG | RESPIRATION RATE: 20 BRPM | DIASTOLIC BLOOD PRESSURE: 55 MMHG | WEIGHT: 131.19 LBS | BODY MASS INDEX: 24 KG/M2

## 2024-07-22 DIAGNOSIS — K83.9 BILE LEAK: Primary | ICD-10-CM

## 2024-07-22 DIAGNOSIS — I95.9 HYPOTENSION, UNSPECIFIED HYPOTENSION TYPE: ICD-10-CM

## 2024-07-22 PROCEDURE — 99024 POSTOP FOLLOW-UP VISIT: CPT

## 2024-07-22 PROCEDURE — 3078F DIAST BP <80 MM HG: CPT

## 2024-07-22 PROCEDURE — 3074F SYST BP LT 130 MM HG: CPT

## 2024-07-22 NOTE — PROGRESS NOTES
[facility]      Patient Name:  Stephy De Oliveira   YOB: 1980   Gender:  Female   Appt Date:  7/22/2024   Provider:  FLAVIA Rowell     PATIENT PROVIDERS  Inpatient Follow Up       CHIEF COMPLAINT  Bile leak follow up       PROBLEMS  Reviewed   Patient Active Problem List   Diagnosis    Elevated liver enzymes    Elevated bilirubin    Common bile duct (CBD) obstruction (HCC)    Calculus of gallbladder with acute cholecystitis and obstruction    Choledocholithiasis    Thrombocytopenia (HCC)    Bile leak    Hypotension        History of Present Illness:  Bile Leak    Patient presented to the ED on 06/25 with abdominal pain and jaundice. Upon arrival to hospital patient was afebrile and labs revealed WBC 9.1 hemoglobin 15.1 platelets 113,000.  Alkaline phosphatase 163   total bilirubin 8.0.  Lipase 33. Patient had elevated LFTs and CT abdomen and pelvis done which showed cholelithiasis and biliary sludge extending into the common bile duct with moderate upstream biliary dilation.  The common bile duct measures up to 9 millimeters.      Then Sabino Sheriff performed a EUS which revealed the common bile duct was dilated with multiple echogenic filling defects with shadowing. He preceded with an ERCP, biliary sphincterotomy, and balloon stone extraction  which showed the CBD and the CHD were dilated with multiple filling defects cw choledocholithiasis, the confluence of the right and left hepatic ducts and the intrahepatics appear wnl, the cystic duct and the gallbladder were not visualized.      Dr. Pereyra performed a laparoscopic cholecystectomy with intraoperative cholangiogram.      GI ordered hida scan: Patient was seen by surgery service with notes of bilious output in STACIE drain. Stat HIDA scan with radiotracer extravasation accumulating in the gallbladder fossa, c/w bile leak with extensive subcutaneous emphysema overlying the chest wall and abdominal wall  extending into the right upper extremity.      Biliary stent placement with Dr. Desai: Unable to place biliary stent due to mid-common bile duct obstruction. This may be due to a CBD clip proximal to the cystic duct. A bile leak is present eminating from the cystic duct.      Patient is s/p laparoscopic cholecystectomy on 6/26/2024, positive HIDA scan for biliary leak. Dr. Desai performed an ERCP to place a biliary stent due to elevated bilirubin but was unable to place stent due to mid-common bile duct obstruction. Abdominal drain continued to have bile concerning for leak.     7/2/2024: s/p Exploratory laparotomy, eze-en-y hepaticojejunostomy, intraoperative ultrasound, omental pedicle flap     Patient doing well post operatively. She is tolerating diet. Denies nausea or vomiting. She states the drain output is very minimal.      Vital Signs:  BP (!) 88/55 (BP Location: Right arm, Patient Position: Sitting, Cuff Size: adult)   Pulse 70   Temp 98.2 °F (36.8 °C) (Temporal)   Resp 20   Wt 59.5 kg (131 lb 3.2 oz)   LMP 05/30/2024   BMI 24.00 kg/m²      Medications Reviewed:    Current Outpatient Medications:     oxyCODONE 5 MG Oral Tab, Take 1 tablet (5 mg total) by mouth every 4 (four) hours as needed., Disp: 30 tablet, Rfl: 0    pantoprazole 20 MG Oral Tab EC, Take 1 tablet (20 mg total) by mouth every morning before breakfast., Disp: , Rfl:      Allergies Reviewed:  Allergies   Allergen Reactions    Advil [Ibuprofen] ITCHING        History:  Reviewed:  Past Medical History:    Ovarian cyst      Reviewed:  History reviewed. No pertinent surgical history.   Reviewed Social History:  Social History     Socioeconomic History    Marital status:    Tobacco Use    Smoking status: Never    Smokeless tobacco: Never   Substance and Sexual Activity    Alcohol use: Yes     Comment: socially    Drug use: Never     Social Determinants of Health     Food Insecurity: No Food Insecurity (6/25/2024)    Food  Insecurity     Food Insecurity: Never true   Transportation Needs: No Transportation Needs (6/25/2024)    Transportation Needs     Lack of Transportation: No   Housing Stability: Low Risk  (6/25/2024)    Housing Stability     Housing Instability: No      Reviewed:  History reviewed. No pertinent family history.   Review of Systems:  Doing well post-operatively  Denies fever or chills  Denies chest pain or SOB  Denies abdominal pain or N/V  Denies warmth, erythema, or drainage at incision       Physical Examination:  Constitutional:  NAD.   Eyes: non-icteric.    Abdomen: Soft, non-tender, non-distended. Incision healing well without drainage or erythema. Staples in place. Upper STACIE with minimal SS drainage. Prior STACIE drain site without erythema or drainage.   Musculoskeletal: no edema.     Document Review:  None     Procedure(s):  Upper STACIE drain removal     Assessment / Plan:  Bile Leak s/p Cholecystectomy  - No acute surgical issues  - Incision site healing well  - Upper abdominal drain removed today due to low output and no bile  - Discussed increasing PO intake with small meals throughout the day and oral hydration.   - Pain controlled without pain medication  - She is scheduled for PCP appointment on  8/5/2024 with Dr Doll in [place].       Follow Up:  With PCP  Our office PRN       Electronically Signed by: FLAVIA Rowell

## 2024-08-05 ENCOUNTER — OFFICE VISIT (OUTPATIENT)
Dept: FAMILY MEDICINE CLINIC | Facility: CLINIC | Age: 44
End: 2024-08-05
Payer: COMMERCIAL

## 2024-08-05 ENCOUNTER — LAB ENCOUNTER (OUTPATIENT)
Dept: LAB | Age: 44
End: 2024-08-05
Attending: STUDENT IN AN ORGANIZED HEALTH CARE EDUCATION/TRAINING PROGRAM
Payer: COMMERCIAL

## 2024-08-05 VITALS
SYSTOLIC BLOOD PRESSURE: 82 MMHG | OXYGEN SATURATION: 99 % | TEMPERATURE: 97 F | BODY MASS INDEX: 24.48 KG/M2 | DIASTOLIC BLOOD PRESSURE: 50 MMHG | WEIGHT: 133 LBS | HEIGHT: 62 IN | HEART RATE: 81 BPM | RESPIRATION RATE: 16 BRPM

## 2024-08-05 DIAGNOSIS — I95.9 HYPOTENSION, UNSPECIFIED HYPOTENSION TYPE: ICD-10-CM

## 2024-08-05 DIAGNOSIS — Z90.49 S/P LAPAROSCOPIC CHOLECYSTECTOMY: ICD-10-CM

## 2024-08-05 DIAGNOSIS — K80.50 CHOLEDOCHOLITHIASIS: Primary | ICD-10-CM

## 2024-08-05 LAB
NT-PROBNP SERPL-MCNC: 43 PG/ML (ref ?–125)
TSI SER-ACNC: 0.9 MIU/ML (ref 0.55–4.78)

## 2024-08-05 PROCEDURE — 3078F DIAST BP <80 MM HG: CPT | Performed by: STUDENT IN AN ORGANIZED HEALTH CARE EDUCATION/TRAINING PROGRAM

## 2024-08-05 PROCEDURE — 3008F BODY MASS INDEX DOCD: CPT | Performed by: STUDENT IN AN ORGANIZED HEALTH CARE EDUCATION/TRAINING PROGRAM

## 2024-08-05 PROCEDURE — 99203 OFFICE O/P NEW LOW 30 MIN: CPT | Performed by: STUDENT IN AN ORGANIZED HEALTH CARE EDUCATION/TRAINING PROGRAM

## 2024-08-05 PROCEDURE — 84443 ASSAY THYROID STIM HORMONE: CPT

## 2024-08-05 PROCEDURE — 83880 ASSAY OF NATRIURETIC PEPTIDE: CPT

## 2024-08-05 PROCEDURE — 36415 COLL VENOUS BLD VENIPUNCTURE: CPT

## 2024-08-05 PROCEDURE — 3074F SYST BP LT 130 MM HG: CPT | Performed by: STUDENT IN AN ORGANIZED HEALTH CARE EDUCATION/TRAINING PROGRAM

## 2024-08-05 NOTE — PROGRESS NOTES
Subjective:      Chief Complaint   Patient presents with    Wound Recheck     In her abdominal - states she had surgery 3-5 weeks ago - denies any pain     HISTORY OF PRESENT ILLNESS  HPI  HPI obtained per patient report.  Stephy De Oliveira is a pleasant 44 year old female presenting for a follow-up after a recent surgery.   She is s/p laparoscopic cholecystectomy on 6/26/24 for choledocholithiasis. She was subsequently found to have a bile leak and underwent exploratory laparotomy and eze-en-y hepaticojejunostomy on 7/2/24. Her drain was removed during an outpatient follow-up with her surgical team on 7/22/24.   She has been found to have low BP during her outpatient follow-up OV's since discharge.     She has been feeling well since discharge and denies any particular concerns. She states that she has had low BP chronically for many years and denies any symptoms of low BP. She states that she had labs and an echo for evaluation of this in the past and all were WNL.     PAST PATIENT HISTORY  Past Medical History:    Ovarian cyst     History reviewed. No pertinent surgical history.    CURRENT MEDICATIONS  No outpatient medications have been marked as taking for the 8/5/24 encounter (Office Visit) with Jade Doll MD.       HEALTH MAINTENANCE    There is no immunization history on file for this patient.    ALLERGIES AND DRUG REACTIONS  Allergies   Allergen Reactions    Advil [Ibuprofen] ITCHING       History reviewed. No pertinent family history.  Social History     Socioeconomic History    Marital status:    Tobacco Use    Smoking status: Never    Smokeless tobacco: Never   Substance and Sexual Activity    Alcohol use: Yes     Comment: socially    Drug use: Never     Social Determinants of Health     Food Insecurity: No Food Insecurity (6/25/2024)    Food Insecurity     Food Insecurity: Never true   Transportation Needs: No Transportation Needs (6/25/2024)    Transportation Needs     Lack of Transportation:  No   Housing Stability: Low Risk  (6/25/2024)    Housing Stability     Housing Instability: No       Review of Systems   All other systems reviewed and are negative.         Objective:      BP (!) 82/50   Pulse 81   Temp 97.2 °F (36.2 °C) (Temporal)   Resp 16   Ht 5' 2\" (1.575 m)   Wt 133 lb (60.3 kg)   LMP 07/30/2024 (Approximate)   SpO2 99%   BMI 24.33 kg/m²   Body mass index is 24.33 kg/m².    Physical Exam  Vitals reviewed.   Constitutional:       General: She is not in acute distress.     Appearance: She is not ill-appearing, toxic-appearing or diaphoretic.   HENT:      Head: Normocephalic and atraumatic.   Eyes:      General: No scleral icterus.        Right eye: No discharge.         Left eye: No discharge.      Extraocular Movements: Extraocular movements intact.      Conjunctiva/sclera: Conjunctivae normal.   Cardiovascular:      Rate and Rhythm: Normal rate and regular rhythm.      Pulses: Normal pulses.      Heart sounds: Normal heart sounds.   Pulmonary:      Effort: Pulmonary effort is normal.      Breath sounds: Normal breath sounds.   Abdominal:      General: Abdomen is flat. Bowel sounds are normal. There is no distension.      Palpations: Abdomen is soft. There is no mass.      Tenderness: There is no abdominal tenderness. There is no guarding or rebound.   Musculoskeletal:      Cervical back: Neck supple.      Right lower leg: No edema.      Left lower leg: No edema.   Skin:     Comments: Abdominal incisional sites C/D/I and healed appropriately   Neurological:      Mental Status: She is alert and oriented to person, place, and time.   Psychiatric:         Mood and Affect: Mood normal.            Assessment and Plan:      1. Choledocholithiasis (Primary)  2. S/P laparoscopic cholecystectomy  3. Hypotension, unspecified hypotension type  -     TSH W Reflex To Free T4; Future; Expected date: 08/05/2024  -     Pro Beta Natriuretic Peptide; Future; Expected date: 08/05/2024    Return if  symptoms worsen or fail to improve.    Choledocholithiasis s/p lap khalif  - hospital records reviewed  - postoperative course complicated by bile leak s/p ex lap with eze-en-y hepaticojejunostomy  - she is now doing well postoperatively    Hypotension   - asymptomatic with normal laboratory and echo evaluation per pt report  - EKG 6/24/24 and 6/25/24 reviewed  - recommended updating a TSH and proBNP for evaluation      Patient verbalized understanding of assessment and recommendations. All questions and concerns were addressed.    Electronically signed by Jade Doll MD

## 2024-08-09 NOTE — PROGRESS NOTES
Please let pt know her TSH and proBNP were normal. Recommend return in 3 months for her annual physical and BP monitoring

## (undated) DEVICE — SOLUTION IRRIG 1000ML 0.9% NACL USP BTL

## (undated) DEVICE — PDS II VLT 0 107CM AG ST3: Brand: ENDOLOOP

## (undated) DEVICE — FUSION OMNI-TOME SPHINCTEROTOME: Brand: FUSION

## (undated) DEVICE — LAPCLINCH GRASPER TIP, DISPOSABLE: Brand: RENEW

## (undated) DEVICE — KIT VLV 5 PC AIR H2O SUCT BX ENDOGATOR CONN

## (undated) DEVICE — SLEEVE COMPR MD KNEE LEN SGL USE KENDALL SCD

## (undated) DEVICE — ANTIBACTERIAL UNDYED BRAIDED (POLYGLACTIN 910), SYNTHETIC ABSORBABLE SUTURE: Brand: COATED VICRYL

## (undated) DEVICE — HIPEC PACK: Brand: MEDLINE INDUSTRIES, INC.

## (undated) DEVICE — SUT COAT VCRL + 0 54IN ABSRB UD ANTIBACT

## (undated) DEVICE — TRADITIONAL MARYLAND DISSECTOR TIP, DISPOSABLE: Brand: RENEW

## (undated) DEVICE — ZIPWIRE GUIDEWIRE .035X150 STR

## (undated) DEVICE — DRAIN SUR 15FR L3/16IN DIA4.7MM SIL RND

## (undated) DEVICE — SHEET,DRAPE,40X58,STERILE: Brand: MEDLINE

## (undated) DEVICE — TROCAR: Brand: KII® SLEEVE

## (undated) DEVICE — SUT PDS II 4-0 36IN RB-1 ABSRB VLT 17MM 1/2

## (undated) DEVICE — DISPOSABLE LAPAROSCOPIC CLIP APPLIER WITH 20 CLIPS.: Brand: EPIX® UNIVERSAL CLIP APPLIER

## (undated) DEVICE — ABC BEND-A-BEAM 3"(7.6CM) HANDCONTROL MALLEABLE HANDPIECE: Brand: ABC BEND-A-BEAM

## (undated) DEVICE — CORDLESS ULTRASONIC DISSECTOR: Brand: SONICISION

## (undated) DEVICE — SUT VCRL 0 L27IN ABSRB VLT L26MM UR-6 5/8-ZZDISC-USE 421016

## (undated) DEVICE — 1200CC GUARDIAN II: Brand: GUARDIAN

## (undated) DEVICE — DRAIN SUR 19FR L0.25IN 3/4 FLUT 3/16IN TRCR

## (undated) DEVICE — SUT PDS II 5-0 30IN RB-2 ABSRB VLT L13MM 1/2

## (undated) DEVICE — CATHETER URET 5FR L70CM FLX OPN TIP NONPORTED

## (undated) DEVICE — ACROBAT 2 CALIBRATED TIP WIRE GUIDE: Brand: ACROBAT

## (undated) DEVICE — ENDOPATH 5MM ENDOSCOPIC BLUNT TIP DISSECTORS (12 POUCHES CONTAINING 3 DISSECTORS EACH): Brand: ENDOPATH

## (undated) DEVICE — SUT PERMA- 3-0 30IN SH NABSRB BLK 26MM 1/2

## (undated) DEVICE — SUT PERMA- 2-0 30IN SH NABSRB BLK L26MM 1/

## (undated) DEVICE — SINGLE USE DISTAL COVER MAJ-2315: Brand: SINGLE USE DISTAL COVER

## (undated) DEVICE — SUT PROL SZ 5-0 36IN RB-1 NABSRB BL

## (undated) DEVICE — CLIP APPLIER WITH CLIP LOGIC TECHNOLOGY: Brand: ENDO CLIP III

## (undated) DEVICE — YANKAUER,POOLE TIP,STERILE,50/CS: Brand: MEDLINE

## (undated) DEVICE — UNDYED BRAIDED (POLYGLACTIN 910), SYNTHETIC ABSORBABLE SUTURE: Brand: COATED VICRYL

## (undated) DEVICE — TUBING MEGADYNE SPECULUM

## (undated) DEVICE — KIT CUSTOM ENDOPROCEDURE STERIS

## (undated) DEVICE — SUT COAT VCRL+ 0 27IN UR-6 ABSRB VLT ANTIBACT

## (undated) DEVICE — SUT PROL 2-0 48IN MH NABSRB BLU L36MM 1/2 CIR

## (undated) DEVICE — SUT PROL 2-0 30IN CT-1 NABSRB BLU L36MM 1/2 C

## (undated) DEVICE — SUT PDS II 3-0 27IN SH ABSRB VLT L26MM 1/2

## (undated) DEVICE — REM POLYHESIVE ADULT PATIENT RETURN ELECTRODE: Brand: VALLEYLAB

## (undated) DEVICE — Device: Brand: SUTURE PASSOR PRO

## (undated) DEVICE — LAP CHOLE/APPY CDS-LF: Brand: MEDLINE INDUSTRIES, INC.

## (undated) DEVICE — #11 STERILE BLADE: Brand: POLYMER COATED BLADES

## (undated) DEVICE — BINDER ABD W9IN STRTCH 46-62IN 3 PNL KNIT E

## (undated) DEVICE — TROCAR: Brand: KII SHIELDED BLADED ACCESS SYSTEM

## (undated) DEVICE — SYRINGE MED 10ML LL TIP W/O SFTY DISP

## (undated) DEVICE — APPLICATOR PREP 26ML CHG 2% ISO ALC 70%

## (undated) DEVICE — BITEBLOCK ENDOSCP 60FR MAXI STRP

## (undated) DEVICE — LIGACLIP EXTRA LIGATING CLIP CARTRIDGES: 6 TITANIUM CLIPS/ CARTRIDGE (LARGE): Brand: LIGACLIP

## (undated) DEVICE — SUT ETHLN 3-0 18IN PS-2 NABSRB BLK 19MM 3/8 C

## (undated) DEVICE — ELECTRODE ES L10.2CM BLDE L4IN EXT MPLR OPN

## (undated) DEVICE — INTENDED TO BE USED TO OCCLUDE, RETRACT AND IDENTIFY ARTERIES, VEINS, TENDONS AND NERVES IN SURGICAL PROCEDURES: Brand: STERION®  VESSEL LOOP

## (undated) DEVICE — BALLOON HEMOSTATIC EUS LINEAR

## (undated) DEVICE — SUT PROL 3-0 36IN SH NABSRB BLU 26MM 1/2 CIR

## (undated) DEVICE — L-HOOK CAUTERY PROBE TIP, DISPOSABLE: Brand: RENEW

## (undated) DEVICE — SUT PERMA- 3-0 18IN SH NABSRB BLK CR 26MM

## (undated) DEVICE — MINI ENDOCUT SCISSOR TIP, DISPOSABLE: Brand: RENEW

## (undated) DEVICE — POUCH SPECIMEN WIRE 6X3 250ML

## (undated) DEVICE — SUT PROL 4-0 36IN RB-1 NABSRB BLU 17MM 1/2 CI

## (undated) DEVICE — ENDOPATH ULTRA VERESS INSUFFLATION NEEDLES WITH LUER LOCK CONNECTORS: Brand: ENDOPATH

## (undated) DEVICE — SNAPLOC WIRE GUIDE LOCKING DEVICE: Brand: SNAPLOC

## (undated) DEVICE — GRABBER GRASPER TIP, DISPOSABLE: Brand: RENEW

## (undated) DEVICE — 40580 - THE PINK PAD - ADVANCED TRENDELENBURG POSITIONING KIT: Brand: 40580 - THE PINK PAD - ADVANCED TRENDELENBURG POSITIONING KIT

## (undated) DEVICE — POWER SHELL: Brand: SIGNIA

## (undated) DEVICE — C-ARM: Brand: UNBRANDED

## (undated) DEVICE — BANDAGE ADH 1INX3IN NAT FAB N ADH PD CURAD

## (undated) DEVICE — CASSETTE DRAPE: Brand: UNBRANDED

## (undated) DEVICE — SUT PDS II 1 96IN TP-1 ABSRB VLT L65MM 1/2

## (undated) DEVICE — INTENDED USED TO PROTECT, TAG AND HELP LOCATED SUTURES DURING SURGERY: Brand: STERION®SUTURE AID BOOTIES

## (undated) DEVICE — RETRIEVAL BALLOON CATHETER: Brand: EXTRACTOR™ PRO RX

## (undated) DEVICE — 3M™ RED DOT™ MONITORING ELECTRODE WITH FOAM TAPE AND STICKY GEL, 50/BAG, 20/CASE, 72/PLT 2570: Brand: RED DOT™

## (undated) DEVICE — EVACUATOR SUR 100CC SIL BLB WND

## (undated) DEVICE — GOWN,SIRUS,FABRIC-REINFORCED,X-LARGE: Brand: MEDLINE

## (undated) DEVICE — STERILE SYNTHETIC POLYISOPRENE POWDER-FREE SURGICAL GLOVES WITH HYDROGEL COATING, SMOOTH FINISH, STRAIGHT FINGER: Brand: PROTEXIS

## (undated) DEVICE — GLOVE SUR 6.5 SENSICARE PI PIP CRM PWD F

## (undated) DEVICE — ARTICULATING RELOAD WITH TRI-STAPLE TECHNOLOGY: Brand: ENDO GIA

## (undated) DEVICE — COVER LT HNDL RIG FOR SUR CAM DISP

## (undated) DEVICE — 10FT COMBINED O2 DELIVERY/CO2 MONITORING. FILTER WITH MICROSTREAM TYPE LUER: Brand: DUAL ADULT NASAL CANNULA

## (undated) DEVICE — BINDER ABD UNISX 9IN 45IN SM AND M UNIV

## (undated) DEVICE — YANKAUER,FLEXIBLE HANDLE,REGLR CAPACITY: Brand: MEDLINE INDUSTRIES, INC.

## (undated) NOTE — LETTER
[address]  [address]  Authorization for Surgical Operation and Procedure     Date:___________                                                                                                         Time:__________  I hereby authorize Jian Bernardo MD, my physician and his/her assistants (if applicable), which may include medical students, residents, and/or fellows, to perform the following surgical operation/ procedure and administer such anesthesia as may be determined necessary by my physician:  Operation/Procedure name (s)         EXPLORATORY LAPAROTOMY, POSSIBLE HEPATICOJEJUNOSTOMY on [facility] Laurenu Angela De Oliveira   2.   I recognize that during the surgical operation/procedure, unforeseen conditions may necessitate additional or different procedures than those listed above.  I, therefore, further authorize and request that the above-named surgeon, assistants, or designees perform such procedures as are, in their judgment, necessary and desirable.    3.   My surgeon/physician has discussed prior to my surgery the potential benefits, risks and side effects of this procedure; the likelihood of achieving goals; and potential problems that might occur during recuperation.  They also discussed reasonable alternatives to the procedure, including risks, benefits, and side effects related to the alternatives and risks related to not receiving this procedure.  I have had all my questions answered and I acknowledge that no guarantee has been made as to the result that may be obtained.    4.   Should the need arise during my operation/procedure, which includes change of level of care prior to discharge, I also consent to the administration of blood and/or blood products.  Further, I understand that despite careful testing and screening of blood or blood products by collecting agencies, I may still be subject to ill effects as a result of receiving a blood transfusion and/or blood products.  The  following are some, but not all, of the potential risks that can occur: fever and allergic reactions, hemolytic reactions, transmission of diseases such as Hepatitis, AIDS and Cytomegalovirus (CMV) and fluid overload.  In the event that I wish to have an autologous transfusion of my own blood, or a directed donor transfusion, I will discuss this with my physician.  Check only if Refusing Blood or Blood Products  I understand refusal of blood or blood products as deemed necessary by my physician may have serious consequences to my condition to include possible death. I hereby assume responsibility for my refusal and release the hospital, its personnel, and my physicians from any responsibility for the consequences of my refusal.          o  Refuse      5.   I authorize the use of any specimen, organs, tissues, body parts or foreign objects that may be removed from my body during the operation/procedure for diagnosis, research or teaching purposes and their subsequent disposal by hospital authorities.  I also authorize the release of specimen test results and/or written reports to my treating physician on the hospital medical staff or other referring or consulting physicians involved in my care, at the discretion of the Pathologist or my treating physician.    6.   I consent to the photographing or videotaping of the operations or procedures to be performed, including appropriate portions of my body for medical, scientific, or educational purposes, provided my identity is not revealed by the pictures or by descriptive texts accompanying them.  If the procedure has been photographed/videotaped, the surgeon will obtain the original picture, image, videotape or CD.  The hospital will not be responsible for storage, release or maintenance of the picture, image, tape or CD.    7.   I consent to the presence of a  or observers in the operating room as deemed necessary by my physician or their designees.     8.   I recognize that in the event my procedure results in extended X-Ray/fluoroscopy time, I may develop a skin reaction.    9. If I have a Do Not Attempt Resuscitation (DNAR) order in place, that status will be suspended while in the operating room, procedural suite, and during the recovery period unless otherwise explicitly stated by me (or a person authorized to consent on my behalf). The surgeon or my attending physician will determine when the applicable recovery period ends for purposes of reinstating the DNAR order.  10. Patients having a sterilization procedure: I understand that if the procedure is successful the results will be permanent and it will therefore be impossible for me to inseminate, conceive, or bear children.  I also understand that the procedure is intended to result in sterility, although the result has not been guaranteed.   11. I acknowledge that my physician has explained sedation/analgesia administration to me including the risk and benefits I consent to the administration of sedation/analgesia as may be necessary or desirable in the judgment of my physician.    I CERTIFY THAT I HAVE READ AND FULLY UNDERSTAND THE ABOVE CONSENT TO OPERATION and/or OTHER PROCEDURE.    _________________________________________  __________________________________  Signature of Patient     Signature of Responsible Person         ___________________________________         Printed Name of Responsible Person           _________________________________                 Relationship to Patient  _________________________________________  ______________________________  Signature of Witness          Date  Time      Patient Name: Stephy De Oliveira     : 1980                 Printed: 2024     Medical Record #: [id number]                     Page 1 of [address] [place] [address] [place]  [address]    Consent for Anesthesia    I, Stephy braun  to be cared for by an anesthesiologist, who is specially trained to monitor me and give me medicine to put me to sleep or keep me comfortable during my procedure    I understand that my anesthesiologist is not an employee or agent of [facility] or [employer]. He or she works for [employer].    As the patient asking for anesthesia services, I agree to:  Allow the anesthesiologist (anesthesia doctor) to give me medicine and do additional procedures as necessary. Some examples are: Starting or using an “IV” to give me medicine, fluids or blood during my procedure, and having a breathing tube placed to help me breathe when I’m asleep (intubation). In the event that my heart stops working properly, I understand that my anesthesiologist will make every effort to sustain my life, unless otherwise directed by [facility] Do Not Resuscitate documents.  Tell my anesthesia doctor before my procedure:  If I am pregnant.  The last time that I ate or drank.  All of the medicines I take (including prescriptions, herbal supplements, and pills I can buy without a prescription (including street drugs/illegal medications). Failure to inform my anesthesiologist about these medicines may increase my risk of anesthetic complications.  If I am allergic to anything or have had a reaction to anesthesia before.  I understand how the anesthesia medicine will help me (benefits).  I understand that with any type of anesthesia medicine there are risks:  The most common risks are: nausea, vomiting, sore throat, muscle soreness, damage to my eyes, mouth, or teeth (from breathing tube placement).  Rare risks include: remembering what happened during my procedure, allergic reactions to medications, injury to my airway, heart, lungs, vision, nerves, or muscles and in extremely rare instances death.  My doctor has explained to me other choices available to me for my care (alternatives).  Pregnant Patients  (“epidural”):  I understand that the risks of having an epidural (medicine given into my back to help control pain during labor), include itching, low blood pressure, difficulty urinating, headache or slowing of the baby’s heart. Very rare risks include infection, bleeding, seizure, irregular heart rhythms and nerve injury.  Regional Anesthesia (“spinal”, “epidural”, & “nerve blocks”):  I understand that rare but potential complications include headache, bleeding, infection, seizure, irregular heart rhythms, and nerve injury.    I can change my mind about having anesthesia services at any time before I get the medicine.    _____________________________________________________________________________  Patient (or Representative) Signature/Relationship to Patient  Date   Time    _____________________________________________________________________________   Name (if used)    Language/Organization   Time    _____________________________________________________________________________  Anesthesiologist Signature     Date   Time  I have discussed the procedure and information above with the patient (or patient’s representative) and answered their questions. The patient or their representative has agreed to have anesthesia services.    _____________________________________________________________________________  Witness        Date   Time  I have verified that the signature is that of the patient or patient’s representative, and that it was signed before the procedure  Patient Name: Stephy De Oliveira     : 1980                 Printed: 2024     Medical Record #: [id number]                     Page 2 of 2

## (undated) NOTE — LETTER
[address]  [address]  Authorization for Surgical Operation and Procedure     Date:__JUNE 27, 2024_________                                                                                                         Time:__________  I hereby authorize Ameya Gray MD, my physician and his/her assistants (if applicable), which may include medical students, residents, and/or fellows, to perform the following surgical operation/ procedure and administer such anesthesia as may be determined necessary by my physician:  Operation/Procedure name (s) ENDOSCOPIC RETROGRADE CHOLANGIOPANCREATOGRAPHY on [facility] Lavonne De Oliveira   2.   I recognize that during the surgical operation/procedure, unforeseen conditions may necessitate additional or different procedures than those listed above.  I, therefore, further authorize and request that the above-named surgeon, assistants, or designees perform such procedures as are, in their judgment, necessary and desirable.    3.   My surgeon/physician has discussed prior to my surgery the potential benefits, risks and side effects of this procedure; the likelihood of achieving goals; and potential problems that might occur during recuperation.  They also discussed reasonable alternatives to the procedure, including risks, benefits, and side effects related to the alternatives and risks related to not receiving this procedure.  I have had all my questions answered and I acknowledge that no guarantee has been made as to the result that may be obtained.    4.   Should the need arise during my operation/procedure, which includes change of level of care prior to discharge, I also consent to the administration of blood and/or blood products.  Further, I understand that despite careful testing and screening of blood or blood products by collecting agencies, I may still be subject to ill effects as a result of receiving a blood transfusion and/or blood  products.  The following are some, but not all, of the potential risks that can occur: fever and allergic reactions, hemolytic reactions, transmission of diseases such as Hepatitis, AIDS and Cytomegalovirus (CMV) and fluid overload.  In the event that I wish to have an autologous transfusion of my own blood, or a directed donor transfusion, I will discuss this with my physician.  Check only if Refusing Blood or Blood Products  I understand refusal of blood or blood products as deemed necessary by my physician may have serious consequences to my condition to include possible death. I hereby assume responsibility for my refusal and release the hospital, its personnel, and my physicians from any responsibility for the consequences of my refusal.          o  Refuse      5.   I authorize the use of any specimen, organs, tissues, body parts or foreign objects that may be removed from my body during the operation/procedure for diagnosis, research or teaching purposes and their subsequent disposal by hospital authorities.  I also authorize the release of specimen test results and/or written reports to my treating physician on the hospital medical staff or other referring or consulting physicians involved in my care, at the discretion of the Pathologist or my treating physician.    6.   I consent to the photographing or videotaping of the operations or procedures to be performed, including appropriate portions of my body for medical, scientific, or educational purposes, provided my identity is not revealed by the pictures or by descriptive texts accompanying them.  If the procedure has been photographed/videotaped, the surgeon will obtain the original picture, image, videotape or CD.  The hospital will not be responsible for storage, release or maintenance of the picture, image, tape or CD.    7.   I consent to the presence of a  or observers in the operating room as deemed necessary by my physician or  their designees.    8.   I recognize that in the event my procedure results in extended X-Ray/fluoroscopy time, I may develop a skin reaction.    9. If I have a Do Not Attempt Resuscitation (DNAR) order in place, that status will be suspended while in the operating room, procedural suite, and during the recovery period unless otherwise explicitly stated by me (or a person authorized to consent on my behalf). The surgeon or my attending physician will determine when the applicable recovery period ends for purposes of reinstating the DNAR order.  10. Patients having a sterilization procedure: I understand that if the procedure is successful the results will be permanent and it will therefore be impossible for me to inseminate, conceive, or bear children.  I also understand that the procedure is intended to result in sterility, although the result has not been guaranteed.   11. I acknowledge that my physician has explained sedation/analgesia administration to me including the risk and benefits I consent to the administration of sedation/analgesia as may be necessary or desirable in the judgment of my physician.    I CERTIFY THAT I HAVE READ AND FULLY UNDERSTAND THE ABOVE CONSENT TO OPERATION and/or OTHER PROCEDURE.    _________________________________________  __________________________________  Signature of Patient     Signature of Responsible Person         ___________________________________         Printed Name of Responsible Person           _________________________________                 Relationship to Patient  _________________________________________  ______________________________  Signature of Witness          Date  Time      Patient Name: Stephy De Oliveira     : 1980                 Printed: 2024     Medical Record #: [id number]                     Page 1 of [address] [place] [address]  [address]    Consent for Anesthesia    I, Thi  Lavonne De Oliveira agree to be cared for by an anesthesiologist, who is specially trained to monitor me and give me medicine to put me to sleep or keep me comfortable during my procedure    I understand that my anesthesiologist is not an employee or agent of [facility] or [employer]. He or she works for [employer].    As the patient asking for anesthesia services, I agree to:  Allow the anesthesiologist (anesthesia doctor) to give me medicine and do additional procedures as necessary. Some examples are: Starting or using an “IV” to give me medicine, fluids or blood during my procedure, and having a breathing tube placed to help me breathe when I’m asleep (intubation). In the event that my heart stops working properly, I understand that my anesthesiologist will make every effort to sustain my life, unless otherwise directed by [facility] Do Not Resuscitate documents.  Tell my anesthesia doctor before my procedure:  If I am pregnant.  The last time that I ate or drank.  All of the medicines I take (including prescriptions, herbal supplements, and pills I can buy without a prescription (including street drugs/illegal medications). Failure to inform my anesthesiologist about these medicines may increase my risk of anesthetic complications.  If I am allergic to anything or have had a reaction to anesthesia before.  I understand how the anesthesia medicine will help me (benefits).  I understand that with any type of anesthesia medicine there are risks:  The most common risks are: nausea, vomiting, sore throat, muscle soreness, damage to my eyes, mouth, or teeth (from breathing tube placement).  Rare risks include: remembering what happened during my procedure, allergic reactions to medications, injury to my airway, heart, lungs, vision, nerves, or muscles and in extremely rare instances death.  My doctor has explained to me other choices available to me for my care  (alternatives).  Pregnant Patients (“epidural”):  I understand that the risks of having an epidural (medicine given into my back to help control pain during labor), include itching, low blood pressure, difficulty urinating, headache or slowing of the baby’s heart. Very rare risks include infection, bleeding, seizure, irregular heart rhythms and nerve injury.  Regional Anesthesia (“spinal”, “epidural”, & “nerve blocks”):  I understand that rare but potential complications include headache, bleeding, infection, seizure, irregular heart rhythms, and nerve injury.    I can change my mind about having anesthesia services at any time before I get the medicine.    _____________________________________________________________________________  Patient (or Representative) Signature/Relationship to Patient  Date   Time    _____________________________________________________________________________   Name (if used)    Language/Organization   Time    _____________________________________________________________________________  Anesthesiologist Signature     Date   Time  I have discussed the procedure and information above with the patient (or patient’s representative) and answered their questions. The patient or their representative has agreed to have anesthesia services.    _____________________________________________________________________________  Witness        Date   Time  I have verified that the signature is that of the patient or patient’s representative, and that it was signed before the procedure  Patient Name: Stephy De Oliveira     : 1980                 Printed: 2024     Medical Record #: [id number]                     Page 2 of 2

## (undated) NOTE — LETTER
[address]  [address]  Authorization for Surgical Operation and Procedure     Date:___________                                                                                                         Time:__________  I hereby authorize Surgeon(s):  Isiah Wong DO Klade, Beatrice, MD, my physician and his/her assistants (if applicable), which may include medical students, residents, and/or fellows, to perform the following surgical operation/ procedure and administer such anesthesia as may be determined necessary by my physician:  Operation/Procedure name (s) Procedure(s):  Laparoscopic [facility]olecyst[facility] with indocyanine green [facility]   2.   I recognize that during the surgical operation/procedure, unforeseen conditions may necessitate additional or different procedures than those listed above.  I, therefore, further authorize and request that the above-named surgeon, assistants, or designees perform such procedures as are, in their judgment, necessary and desirable.    3.   My surgeon/physician has discussed prior to my surgery the potential benefits, risks and side effects of this procedure; the likelihood of achieving goals; and potential problems that might occur during recuperation.  They also discussed reasonable alternatives to the procedure, including risks, benefits, and side effects related to the alternatives and risks related to not receiving this procedure.  I have had all my questions answered and I acknowledge that no guarantee has been made as to the result that may be obtained.    4.   Should the need arise during my operation/procedure, which includes change of level of care prior to discharge, I also consent to the administration of blood and/or blood products.  Further, I understand that despite careful testing and screening of blood or blood products by collecting agencies, I may still be subject to ill effects as a result  of receiving a blood transfusion and/or blood products.  The following are some, but not all, of the potential risks that can occur: fever and allergic reactions, hemolytic reactions, transmission of diseases such as Hepatitis, AIDS and Cytomegalovirus (CMV) and fluid overload.  In the event that I wish to have an autologous transfusion of my own blood, or a directed donor transfusion, I will discuss this with my physician.  Check only if Refusing Blood or Blood Products  I understand refusal of blood or blood products as deemed necessary by my physician may have serious consequences to my condition to include possible death. I hereby assume responsibility for my refusal and release the hospital, its personnel, and my physicians from any responsibility for the consequences of my refusal.          o  Refuse      5.   I authorize the use of any specimen, organs, tissues, body parts or foreign objects that may be removed from my body during the operation/procedure for diagnosis, research or teaching purposes and their subsequent disposal by hospital authorities.  I also authorize the release of specimen test results and/or written reports to my treating physician on the hospital medical staff or other referring or consulting physicians involved in my care, at the discretion of the Pathologist or my treating physician.    6.   I consent to the photographing or videotaping of the operations or procedures to be performed, including appropriate portions of my body for medical, scientific, or educational purposes, provided my identity is not revealed by the pictures or by descriptive texts accompanying them.  If the procedure has been photographed/videotaped, the surgeon will obtain the original picture, image, videotape or CD.  The hospital will not be responsible for storage, release or maintenance of the picture, image, tape or CD.    7.   I consent to the presence of a  or observers in the operating  room as deemed necessary by my physician or their designees.    8.   I recognize that in the event my procedure results in extended X-Ray/fluoroscopy time, I may develop a skin reaction.    9. If I have a Do Not Attempt Resuscitation (DNAR) order in place, that status will be suspended while in the operating room, procedural suite, and during the recovery period unless otherwise explicitly stated by me (or a person authorized to consent on my behalf). The surgeon or my attending physician will determine when the applicable recovery period ends for purposes of reinstating the DNAR order.  10. Patients having a sterilization procedure: I understand that if the procedure is successful the results will be permanent and it will therefore be impossible for me to inseminate, conceive, or bear children.  I also understand that the procedure is intended to result in sterility, although the result has not been guaranteed.   11. I acknowledge that my physician has explained sedation/analgesia administration to me including the risk and benefits I consent to the administration of sedation/analgesia as may be necessary or desirable in the judgment of my physician.    I CERTIFY THAT I HAVE READ AND FULLY UNDERSTAND THE ABOVE CONSENT TO OPERATION and/or OTHER PROCEDURE.    _________________________________________  __________________________________  Signature of Patient     Signature of Responsible Person         ___________________________________         Printed Name of Responsible Person           _________________________________                 Relationship to Patient  _________________________________________  ______________________________  Signature of Witness          Date  Time      Patient Name: Stephy De Oliveira     : 1980                 Printed: 2024     Medical Record #: [id number]                     Page 1 of [address] [place] [address]   [id number]    Consent for Anesthesia    Stephy BUTLER agree to be cared for by an anesthesiologist, who is specially trained to monitor me and give me medicine to put me to sleep or keep me comfortable during my procedure    I understand that my anesthesiologist is not an employee or agent of [facility] or [employer]. He or she works for [email or web address].    As the patient asking for anesthesia services, I agree to:  Allow the anesthesiologist (anesthesia doctor) to give me medicine and do additional procedures as necessary. Some examples are: Starting or using an “IV” to give me medicine, fluids or blood during my procedure, and having a breathing tube placed to help me breathe when I’m asleep (intubation). In the event that my heart stops working properly, I understand that my anesthesiologist will make every effort to sustain my life, unless otherwise directed by [facility] Do Not Resuscitate documents.  Tell my anesthesia doctor before my procedure:  If I am pregnant.  The last time that I ate or drank.  All of the medicines I take (including prescriptions, herbal supplements, and pills I can buy without a prescription (including street drugs/illegal medications). Failure to inform my anesthesiologist about these medicines may increase my risk of anesthetic complications.  If I am allergic to anything or have had a reaction to anesthesia before.  I understand how the anesthesia medicine will help me (benefits).  I understand that with any type of anesthesia medicine there are risks:  The most common risks are: nausea, vomiting, sore throat, muscle soreness, damage to my eyes, mouth, or teeth (from breathing tube placement).  Rare risks include: remembering what happened during my procedure, allergic reactions to medications, injury to my airway, heart, lungs, vision, nerves, or muscles and in extremely rare instances death.  My doctor has explained to me other choices  available to me for my care (alternatives).  Pregnant Patients (“epidural”):  I understand that the risks of having an epidural (medicine given into my back to help control pain during labor), include itching, low blood pressure, difficulty urinating, headache or slowing of the baby’s heart. Very rare risks include infection, bleeding, seizure, irregular heart rhythms and nerve injury.  Regional Anesthesia (“spinal”, “epidural”, & “nerve blocks”):  I understand that rare but potential complications include headache, bleeding, infection, seizure, irregular heart rhythms, and nerve injury.    I can change my mind about having anesthesia services at any time before I get the medicine.    _____________________________________________________________________________  Patient (or Representative) Signature/Relationship to Patient  Date   Time    _____________________________________________________________________________   Name (if used)    Language/Organization   Time    _____________________________________________________________________________  Anesthesiologist Signature     Date   Time  I have discussed the procedure and information above with the patient (or patient’s representative) and answered their questions. The patient or their representative has agreed to have anesthesia services.    _____________________________________________________________________________  Witness        Date   Time  I have verified that the signature is that of the patient or patient’s representative, and that it was signed before the procedure  Patient Name: Stephy De Oliveira     : 1980                 Printed: 2024     Medical Record #: [id number]                     Page 2 of 2

## (undated) NOTE — LETTER
[address]  [address]  Authorization for Surgical Operation and Procedure     Date:___________                                                                                                         Time:__________  I hereby authorize Surgeon(s):  Km Gallo MD, my physician and his/her assistants (if applicable), which may include medical students, residents, and/or fellows, to perform the following surgical operation/ procedure and administer such anesthesia as may be determined necessary by my physician:  Operation/Procedure name (s) Procedure(s):  ENDOSCOPIC ULTRASOUND (EUS) WITH ENDOSCOPIC RETROGRADE CHOLABGIOPANCREATOGRAPHY  ENDOSCOPIC RETROGRADE CHOLANGIOPANCREATOGRAPHY (ERCP) on [facility] Thu Thao De Oliveira   2.   I recognize that during the surgical operation/procedure, unforeseen conditions may necessitate additional or different procedures than those listed above.  I, therefore, further authorize and request that the above-named surgeon, assistants, or designees perform such procedures as are, in their judgment, necessary and desirable.    3.   My surgeon/physician has discussed prior to my surgery the potential benefits, risks and side effects of this procedure; the likelihood of achieving goals; and potential problems that might occur during recuperation.  They also discussed reasonable alternatives to the procedure, including risks, benefits, and side effects related to the alternatives and risks related to not receiving this procedure.  I have had all my questions answered and I acknowledge that no guarantee has been made as to the result that may be obtained.    4.   Should the need arise during my operation/procedure, which includes change of level of care prior to discharge, I also consent to the administration of blood and/or blood products.  Further, I understand that despite careful testing and screening of blood or blood products by collecting agencies, I  may still be subject to ill effects as a result of receiving a blood transfusion and/or blood products.  The following are some, but not all, of the potential risks that can occur: fever and allergic reactions, hemolytic reactions, transmission of diseases such as Hepatitis, AIDS and Cytomegalovirus (CMV) and fluid overload.  In the event that I wish to have an autologous transfusion of my own blood, or a directed donor transfusion, I will discuss this with my physician.  Check only if Refusing Blood or Blood Products  I understand refusal of blood or blood products as deemed necessary by my physician may have serious consequences to my condition to include possible death. I hereby assume responsibility for my refusal and release the hospital, its personnel, and my physicians from any responsibility for the consequences of my refusal.          o  Refuse      5.   I authorize the use of any specimen, organs, tissues, body parts or foreign objects that may be removed from my body during the operation/procedure for diagnosis, research or teaching purposes and their subsequent disposal by hospital authorities.  I also authorize the release of specimen test results and/or written reports to my treating physician on the hospital medical staff or other referring or consulting physicians involved in my care, at the discretion of the Pathologist or my treating physician.    6.   I consent to the photographing or videotaping of the operations or procedures to be performed, including appropriate portions of my body for medical, scientific, or educational purposes, provided my identity is not revealed by the pictures or by descriptive texts accompanying them.  If the procedure has been photographed/videotaped, the surgeon will obtain the original picture, image, videotape or CD.  The hospital will not be responsible for storage, release or maintenance of the picture, image, tape or CD.    7.   I consent to the presence of a   or observers in the operating room as deemed necessary by my physician or their designees.    8.   I recognize that in the event my procedure results in extended X-Ray/fluoroscopy time, I may develop a skin reaction.    9. If I have a Do Not Attempt Resuscitation (DNAR) order in place, that status will be suspended while in the operating room, procedural suite, and during the recovery period unless otherwise explicitly stated by me (or a person authorized to consent on my behalf). The surgeon or my attending physician will determine when the applicable recovery period ends for purposes of reinstating the DNAR order.  10. Patients having a sterilization procedure: I understand that if the procedure is successful the results will be permanent and it will therefore be impossible for me to inseminate, conceive, or bear children.  I also understand that the procedure is intended to result in sterility, although the result has not been guaranteed.   11. I acknowledge that my physician has explained sedation/analgesia administration to me including the risk and benefits I consent to the administration of sedation/analgesia as may be necessary or desirable in the judgment of my physician.    I CERTIFY THAT I HAVE READ AND FULLY UNDERSTAND THE ABOVE CONSENT TO OPERATION and/or OTHER PROCEDURE.    _________________________________________  __________________________________  Signature of Patient     Signature of Responsible Person         ___________________________________         Printed Name of Responsible Person           _________________________________                 Relationship to Patient  _________________________________________  ______________________________  Signature of Witness          Date  Time      Patient Name: Stephy De Oliveira     : 1980                 Printed: 2024     Medical Record #: [id number]                     Page 2 of 3                                     [address]  [address]    Consent for Anesthesia    Stephy BUTLER agree to be cared for by an anesthesiologist, who is specially trained to monitor me and give me medicine to put me to sleep or keep me comfortable during my procedure    I understand that my anesthesiologist is not an employee or agent of [facility] or [employer]. He or she works for [employer].    As the patient asking for anesthesia services, I agree to:  Allow the anesthesiologist (anesthesia doctor) to give me medicine and do additional procedures as necessary. Some examples are: Starting or using an “IV” to give me medicine, fluids or blood during my procedure, and having a breathing tube placed to help me breathe when I’m asleep (intubation). In the event that my heart stops working properly, I understand that my anesthesiologist will make every effort to sustain my life, unless otherwise directed by [facility] Do Not Resuscitate documents.  Tell my anesthesia doctor before my procedure:  If I am pregnant.  The last time that I ate or drank.  All of the medicines I take (including prescriptions, herbal supplements, and pills I can buy without a prescription (including street drugs/illegal medications). Failure to inform my anesthesiologist about these medicines may increase my risk of anesthetic complications.  If I am allergic to anything or have had a reaction to anesthesia before.  I understand how the anesthesia medicine will help me (benefits).  I understand that with any type of anesthesia medicine there are risks:  The most common risks are: nausea, vomiting, sore throat, muscle soreness, damage to my eyes, mouth, or teeth (from breathing tube placement).  Rare risks include: remembering what happened during my procedure, allergic reactions to medications, injury to my airway, heart, lungs, vision, nerves, or muscles and in extremely rare instances  death.  My doctor has explained to me other choices available to me for my care (alternatives).  Pregnant Patients (“epidural”):  I understand that the risks of having an epidural (medicine given into my back to help control pain during labor), include itching, low blood pressure, difficulty urinating, headache or slowing of the baby’s heart. Very rare risks include infection, bleeding, seizure, irregular heart rhythms and nerve injury.  Regional Anesthesia (“spinal”, “epidural”, & “nerve blocks”):  I understand that rare but potential complications include headache, bleeding, infection, seizure, irregular heart rhythms, and nerve injury.    I can change my mind about having anesthesia services at any time before I get the medicine.    _____________________________________________________________________________  Patient (or Representative) Signature/Relationship to Patient  Date   Time    _____________________________________________________________________________   Name (if used)    Language/Organization   Time    _____________________________________________________________________________  Anesthesiologist Signature     Date   Time  I have discussed the procedure and information above with the patient (or patient’s representative) and answered their questions. The patient or their representative has agreed to have anesthesia services.    _____________________________________________________________________________  Witness        Date   Time  I have verified that the signature is that of the patient or patient’s representative, and that it was signed before the procedure  Patient Name: Stephy De Oliveira     : 1980                 Printed: 2024     Medical Record #: [id number]                     Page 3 of 3

## (undated) NOTE — LETTER
[address]  [address]  Authorization for Surgical Operation and Procedure     Date:___________                                                                                                         Time:__________  I hereby authorize Cheyanne Pereyra MD, my physician and his/her assistants (if applicable), which may include medical students, residents, and/or fellows, to perform the following surgical operation/ procedure and administer such anesthesia as may be determined necessary by my physician: LAPAROSCOPIC CHOLECYSTECTOMY WITH INDOCYANINE GREEN on Stephy De Oliveira   2.   I recognize that during the surgical operation/procedure, unforeseen conditions may necessitate additional or different procedures than those listed above.  I, therefore, further authorize and request that the above-named surgeon, assistants, or designees perform such procedures as are, in their judgment, necessary and desirable.    3.   My surgeon/physician has discussed prior to my surgery the potential benefits, risks and side effects of this procedure; the likelihood of achieving goals; and potential problems that might occur during recuperation.  They also discussed reasonable alternatives to the procedure, including risks, benefits, and side effects related to the alternatives and risks related to not receiving this procedure.  I have had all my questions answered and I acknowledge that no guarantee has been made as to the result that may be obtained.    4.   Should the need arise during my operation/procedure, which includes change of level of care prior to discharge, I also consent to the administration of blood and/or blood products.  Further, I understand that despite careful testing and screening of blood or blood products by collecting agencies, I may still be subject to ill effects as a result of receiving a blood transfusion and/or blood products.  The following are some, but not all, of  the potential risks that can occur: fever and allergic reactions, hemolytic reactions, transmission of diseases such as Hepatitis, AIDS and Cytomegalovirus (CMV) and fluid overload.  In the event that I wish to have an autologous transfusion of my own blood, or a directed donor transfusion, I will discuss this with my physician.  Check only if Refusing Blood or Blood Products  I understand refusal of blood or blood products as deemed necessary by my physician may have serious consequences to my condition to include possible death. I hereby assume responsibility for my refusal and release the hospital, its personnel, and my physicians from any responsibility for the consequences of my refusal.          o  Refuse      5.   I authorize the use of any specimen, organs, tissues, body parts or foreign objects that may be removed from my body during the operation/procedure for diagnosis, research or teaching purposes and their subsequent disposal by hospital authorities.  I also authorize the release of specimen test results and/or written reports to my treating physician on the hospital medical staff or other referring or consulting physicians involved in my care, at the discretion of the Pathologist or my treating physician.    6.   I consent to the photographing or videotaping of the operations or procedures to be performed, including appropriate portions of my body for medical, scientific, or educational purposes, provided my identity is not revealed by the pictures or by descriptive texts accompanying them.  If the procedure has been photographed/videotaped, the surgeon will obtain the original picture, image, videotape or CD.  The hospital will not be responsible for storage, release or maintenance of the picture, image, tape or CD.    7.   I consent to the presence of a  or observers in the operating room as deemed necessary by my physician or their designees.    8.   I recognize that in the event  my procedure results in extended X-Ray/fluoroscopy time, I may develop a skin reaction.    9. If I have a Do Not Attempt Resuscitation (DNAR) order in place, that status will be suspended while in the operating room, procedural suite, and during the recovery period unless otherwise explicitly stated by me (or a person authorized to consent on my behalf). The surgeon or my attending physician will determine when the applicable recovery period ends for purposes of reinstating the DNAR order.  10. Patients having a sterilization procedure: I understand that if the procedure is successful the results will be permanent and it will therefore be impossible for me to inseminate, conceive, or bear children.  I also understand that the procedure is intended to result in sterility, although the result has not been guaranteed.   11. I acknowledge that my physician has explained sedation/analgesia administration to me including the risk and benefits I consent to the administration of sedation/analgesia as may be necessary or desirable in the judgment of my physician.    I CERTIFY THAT I HAVE READ AND FULLY UNDERSTAND THE ABOVE CONSENT TO OPERATION and/or OTHER PROCEDURE.    _________________________________________  __________________________________  Signature of Patient     Signature of Responsible Person         ___________________________________         Printed Name of Responsible Person           _________________________________                 Relationship to Patient  _________________________________________  ______________________________  Signature of Witness          Date  Time      Patient Name: Stepyh De Oliveira     : 1980                 Printed: 2024     Medical Record #: [id number]                     Page 1 [address]  [id number]    Consent for Anesthesia    IStephy agree to be cared for by an  anesthesiologist, who is specially trained to monitor me and give me medicine to put me to sleep or keep me comfortable during my procedure    I understand that my anesthesiologist is not an employee or agent of [facility] or [employer]. He or she works for [employer].    As the patient asking for anesthesia services, I agree to:  Allow the anesthesiologist (anesthesia doctor) to give me medicine and do additional procedures as necessary. Some examples are: Starting or using an “IV” to give me medicine, fluids or blood during my procedure, and having a breathing tube placed to help me breathe when I’m asleep (intubation). In the event that my heart stops working properly, I understand that my anesthesiologist will make every effort to sustain my life, unless otherwise directed by [facility] Do Not Resuscitate documents.  Tell my anesthesia doctor before my procedure:  If I am pregnant.  The last time that I ate or drank.  All of the medicines I take (including prescriptions, herbal supplements, and pills I can buy without a prescription (including street drugs/illegal medications). Failure to inform my anesthesiologist about these medicines may increase my risk of anesthetic complications.  If I am allergic to anything or have had a reaction to anesthesia before.  I understand how the anesthesia medicine will help me (benefits).  I understand that with any type of anesthesia medicine there are risks:  The most common risks are: nausea, vomiting, sore throat, muscle soreness, damage to my eyes, mouth, or teeth (from breathing tube placement).  Rare risks include: remembering what happened during my procedure, allergic reactions to medications, injury to my airway, heart, lungs, vision, nerves, or muscles and in extremely rare instances death.  My doctor has explained to me other choices available to me for my care (alternatives).  Pregnant Patients (“epidural”):  I understand  that the risks of having an epidural (medicine given into my back to help control pain during labor), include itching, low blood pressure, difficulty urinating, headache or slowing of the baby’s heart. Very rare risks include infection, bleeding, seizure, irregular heart rhythms and nerve injury.  Regional Anesthesia (“spinal”, “epidural”, & “nerve blocks”):  I understand that rare but potential complications include headache, bleeding, infection, seizure, irregular heart rhythms, and nerve injury.    I can change my mind about having anesthesia services at any time before I get the medicine.    _____________________________________________________________________________  Patient (or Representative) Signature/Relationship to Patient  Date   Time    _____________________________________________________________________________   Name (if used)    Language/Organization   Time    _____________________________________________________________________________  Anesthesiologist Signature     Date   Time  I have discussed the procedure and information above with the patient (or patient’s representative) and answered their questions. The patient or their representative has agreed to have anesthesia services.    _____________________________________________________________________________  Witness        Date   Time  I have verified that the signature is that of the patient or patient’s representative, and that it was signed before the procedure  Patient Name: Stephy De Oliveira     : 1980                 Printed: 2024     Medical Record #: [id number]                     Page 2 of 2

## (undated) NOTE — LETTER
[address]  [address]  Authorization for Surgical Operation and Procedure     Date:___________                                                                                                         Time:__________  I hereby authorize Surgeon(s):  Isiah Wong DO Klade, Beatrice, MD, my physician and his/her assistants (if applicable), which may include medical students, residents, and/or fellows, to perform the following surgical operation/ procedure and administer such anesthesia as may be determined necessary by my physician:  Operation/Procedure name (s) Procedure(s):  Laparoscopic cholecystectomy with indocyanine green [facility]   2.   I recognize that during the surgical operation/procedure, unforeseen conditions may necessitate additional or different procedures than those listed above.  I, therefore, further authorize and request that the above-named surgeon, assistants, or designees perform such procedures as are, in their judgment, necessary and desirable.    3.   My surgeon/physician has discussed prior to my surgery the potential benefits, risks and side effects of this procedure; the likelihood of achieving goals; and potential problems that might occur during recuperation.  They also discussed reasonable alternatives to the procedure, including risks, benefits, and side effects related to the alternatives and risks related to not receiving this procedure.  I have had all my questions answered and I acknowledge that no guarantee has been made as to the result that may be obtained.    4.   Should the need arise during my operation/procedure, which includes change of level of care prior to discharge, I also consent to the administration of blood and/or blood products.  Further, I understand that despite careful testing and screening of blood or blood products by collecting agencies, I may still be subject to ill effects as a result  of receiving a blood transfusion and/or blood products.  The following are some, but not all, of the potential risks that can occur: fever and allergic reactions, hemolytic reactions, transmission of diseases such as Hepatitis, AIDS and Cytomegalovirus (CMV) and fluid overload.  In the event that I wish to have an autologous transfusion of my own blood, or a directed donor transfusion, I will discuss this with my physician.  Check only if Refusing Blood or Blood Products  I understand refusal of blood or blood products as deemed necessary by my physician may have serious consequences to my condition to include possible death. I hereby assume responsibility for my refusal and release the hospital, its personnel, and my physicians from any responsibility for the consequences of my refusal.          o  Refuse      5.   I authorize the use of any specimen, organs, tissues, body parts or foreign objects that may be removed from my body during the operation/procedure for diagnosis, research or teaching purposes and their subsequent disposal by hospital authorities.  I also authorize the release of specimen test results and/or written reports to my treating physician on the hospital medical staff or other referring or consulting physicians involved in my care, at the discretion of the Pathologist or my treating physician.    6.   I consent to the photographing or videotaping of the operations or procedures to be performed, including appropriate portions of my body for medical, scientific, or educational purposes, provided my identity is not revealed by the pictures or by descriptive texts accompanying them.  If the procedure has been photographed/videotaped, the surgeon will obtain the original picture, image, videotape or CD.  The hospital will not be responsible for storage, release or maintenance of the picture, image, tape or CD.    7.   I consent to the presence of a  or observers in the operating  room as deemed necessary by my physician or their designees.    8.   I recognize that in the event my procedure results in extended X-Ray/fluoroscopy time, I may develop a skin reaction.    9. If I have a Do Not Attempt Resuscitation (DNAR) order in place, that status will be suspended while in the operating room, procedural suite, and during the recovery period unless otherwise explicitly stated by me (or a person authorized to consent on my behalf). The surgeon or my attending physician will determine when the applicable recovery period ends for purposes of reinstating the DNAR order.  10. Patients having a sterilization procedure: I understand that if the procedure is successful the results will be permanent and it will therefore be impossible for me to inseminate, conceive, or bear children.  I also understand that the procedure is intended to result in sterility, although the result has not been guaranteed.   11. I acknowledge that my physician has explained sedation/analgesia administration to me including the risk and benefits I consent to the administration of sedation/analgesia as may be necessary or desirable in the judgment of my physician.    I CERTIFY THAT I HAVE READ AND FULLY UNDERSTAND THE ABOVE CONSENT TO OPERATION and/or OTHER PROCEDURE.    _________________________________________  __________________________________  Signature of Patient     Signature of Responsible Person         ___________________________________         Printed Name of Responsible Person           _________________________________                 Relationship to Patient  _________________________________________  ______________________________  Signature of Witness          Date  Time      Patient Name: Stephy De Oliveira     : 1980                 Printed: 2024     Medical Record #: [id number]                     Page 1 of [address] [place] [address]   [id number]    Consent for Anesthesia    Stephy BUTLER agree to be cared for by an anesthesiologist, who is specially trained to monitor me and give me medicine to put me to sleep or keep me comfortable during my procedure    I understand that my anesthesiologist is not an employee or agent of [facility] or [employer]. He or she works for [employer].    As the patient asking for anesthesia services, I agree to:  Allow the anesthesiologist (anesthesia doctor) to give me medicine and do additional procedures as necessary. Some examples are: Starting or using an “IV” to give me medicine, fluids or blood during my procedure, and having a breathing tube placed to help me breathe when I’m asleep (intubation). In the event that my heart stops working properly, I understand that my anesthesiologist will make every effort to sustain my life, unless otherwise directed by [facility] Do Not Resuscitate documents.  Tell my anesthesia doctor before my procedure:  If I am pregnant.  The last time that I ate or drank.  All of the medicines I take (including prescriptions, herbal supplements, and pills I can buy without a prescription (including street drugs/illegal medications). Failure to inform my anesthesiologist about these medicines may increase my risk of anesthetic complications.  If I am allergic to anything or have had a reaction to anesthesia before.  I understand how the anesthesia medicine will help me (benefits).  I understand that with any type of anesthesia medicine there are risks:  The most common risks are: nausea, vomiting, sore throat, muscle soreness, damage to my eyes, mouth, or teeth (from breathing tube placement).  Rare risks include: remembering what happened during my procedure, allergic reactions to medications, injury to my airway, heart, lungs, vision, nerves, or muscles and in extremely rare instances death.  My doctor has explained to me other choices  available to me for my care (alternatives).  Pregnant Patients (“epidural”):  I understand that the risks of having an epidural (medicine given into my back to help control pain during labor), include itching, low blood pressure, difficulty urinating, headache or slowing of the baby’s heart. Very rare risks include infection, bleeding, seizure, irregular heart rhythms and nerve injury.  Regional Anesthesia (“spinal”, “epidural”, & “nerve blocks”):  I understand that rare but potential complications include headache, bleeding, infection, seizure, irregular heart rhythms, and nerve injury.    I can change my mind about having anesthesia services at any time before I get the medicine.    _____________________________________________________________________________  Patient (or Representative) Signature/Relationship to Patient  Date   Time    _____________________________________________________________________________   Name (if used)    Language/Organization   Time    _____________________________________________________________________________  Anesthesiologist Signature     Date   Time  I have discussed the procedure and information above with the patient (or patient’s representative) and answered their questions. The patient or their representative has agreed to have anesthesia services.    _____________________________________________________________________________  Witness        Date   Time  I have verified that the signature is that of the patient or patient’s representative, and that it was signed before the procedure  Patient Name: Stephy De Oliveira     : 1980                 Printed: 2024     Medical Record #: [id number]                     Page 2 of 2

## (undated) NOTE — LETTER
[address]  [address]  Authorization for Surgical Operation and Procedure     Date:___________                                                                                                         Time:__________  I hereby authorize Surgeon(s):  Ameya Desai DO, my physician and his/her assistants (if applicable), which may include medical students, residents, and/or fellows, to perform the following surgical operation/ procedure and administer such anesthesia as may be determined necessary by my physician:  Operation/Procedure name (s) Procedure(s):  ENDOSCOPIC RETROGRADE CHOLANGIOPANCREATOGRAPHY (ERCP) on [facility]   2.   I recognize that during the surgical operation/procedure, unforeseen conditions may necessitate additional or different procedures than those listed above.  I, therefore, further authorize and request that the above-named surgeon, assistants, or designees perform such procedures as are, in their judgment, necessary and desirable.    3.   My surgeon/physician has discussed prior to my surgery the potential benefits, risks and side effects of this procedure; the likelihood of achieving goals; and potential problems that might occur during recuperation.  They also discussed reasonable alternatives to the procedure, including risks, benefits, and side effects related to the alternatives and risks related to not receiving this procedure.  I have had all my questions answered and I acknowledge that no guarantee has been made as to the result that may be obtained.    4.   Should the need arise during my operation/procedure, which includes change of level of care prior to discharge, I also consent to the administration of blood and/or blood products.  Further, I understand that despite careful testing and screening of blood or blood products by collecting agencies, I may still be subject to ill effects as a result of receiving a blood transfusion  and/or blood products.  The following are some, but not all, of the potential risks that can occur: fever and allergic reactions, hemolytic reactions, transmission of diseases such as Hepatitis, AIDS and Cytomegalovirus (CMV) and fluid overload.  In the event that I wish to have an autologous transfusion of my own blood, or a directed donor transfusion, I will discuss this with my physician.  Check only if Refusing Blood or Blood Products  I understand refusal of blood or blood products as deemed necessary by my physician may have serious consequences to my condition to include possible death. I hereby assume responsibility for my refusal and release the hospital, its personnel, and my physicians from any responsibility for the consequences of my refusal.          o  Refuse      5.   I authorize the use of any specimen, organs, tissues, body parts or foreign objects that may be removed from my body during the operation/procedure for diagnosis, research or teaching purposes and their subsequent disposal by hospital authorities.  I also authorize the release of specimen test results and/or written reports to my treating physician on the hospital medical staff or other referring or consulting physicians involved in my care, at the discretion of the Pathologist or my treating physician.    6.   I consent to the photographing or videotaping of the operations or procedures to be performed, including appropriate portions of my body for medical, scientific, or educational purposes, provided my identity is not revealed by the pictures or by descriptive texts accompanying them.  If the procedure has been photographed/videotaped, the surgeon will obtain the original picture, image, videotape or CD.  The hospital will not be responsible for storage, release or maintenance of the picture, image, tape or CD.    7.   I consent to the presence of a  or observers in the operating room as deemed necessary by my  physician or their designees.    8.   I recognize that in the event my procedure results in extended X-Ray/fluoroscopy time, I may develop a skin reaction.    9. If I have a Do Not Attempt Resuscitation (DNAR) order in place, that status will be suspended while in the operating room, procedural suite, and during the recovery period unless otherwise explicitly stated by me (or a person authorized to consent on my behalf). The surgeon or my attending physician will determine when the applicable recovery period ends for purposes of reinstating the DNAR order.  10. Patients having a sterilization procedure: I understand that if the procedure is successful the results will be permanent and it will therefore be impossible for me to inseminate, conceive, or bear children.  I also understand that the procedure is intended to result in sterility, although the result has not been guaranteed.   11. I acknowledge that my physician has explained sedation/analgesia administration to me including the risk and benefits I consent to the administration of sedation/analgesia as may be necessary or desirable in the judgment of my physician.    I CERTIFY THAT I HAVE READ AND FULLY UNDERSTAND THE ABOVE CONSENT TO OPERATION and/or OTHER PROCEDURE.    _________________________________________  __________________________________  Signature of Patient     Signature of Responsible Person         ___________________________________         Printed Name of Responsible Person           _________________________________                 Relationship to Patient  _________________________________________  ______________________________  Signature of Witness          Date  Time      Patient Name: Stephy De Oliveira     : 1980                 Printed: 2024     Medical Record #: [id number]                     Page 1 of 2                                    [address]  [address]    Consent for  Anesthesia    I, Stephy De Oliveira agree to be cared for by an anesthesiologist, who is specially trained to monitor me and give me medicine to put me to sleep or keep me comfortable during my procedure    I understand that my anesthesiologist is not an employee or agent of [facility] or [employer]. He or she works for [employer].    As the patient asking for anesthesia services, I agree to:  Allow the anesthesiologist (anesthesia doctor) to give me medicine and do additional procedures as necessary. Some examples are: Starting or using an “IV” to give me medicine, fluids or blood during my procedure, and having a breathing tube placed to help me breathe when I’m asleep (intubation). In the event that my heart stops working properly, I understand that my anesthesiologist will make every effort to sustain my life, unless otherwise directed by [facility] Do Not Resuscitate documents.  Tell my anesthesia doctor before my procedure:  If I am pregnant.  The last time that I ate or drank.  All of the medicines I take (including prescriptions, herbal supplements, and pills I can buy without a prescription (including street drugs/illegal medications). Failure to inform my anesthesiologist about these medicines may increase my risk of anesthetic complications.  If I am allergic to anything or have had a reaction to anesthesia before.  I understand how the anesthesia medicine will help me (benefits).  I understand that with any type of anesthesia medicine there are risks:  The most common risks are: nausea, vomiting, sore throat, muscle soreness, damage to my eyes, mouth, or teeth (from breathing tube placement).  Rare risks include: remembering what happened during my procedure, allergic reactions to medications, injury to my airway, heart, lungs, vision, nerves, or muscles and in extremely rare instances death.  My doctor has explained to me other choices available to me for my care  (alternatives).  Pregnant Patients (“epidural”):  I understand that the risks of having an epidural (medicine given into my back to help control pain during labor), include itching, low blood pressure, difficulty urinating, headache or slowing of the baby’s heart. Very rare risks include infection, bleeding, seizure, irregular heart rhythms and nerve injury.  Regional Anesthesia (“spinal”, “epidural”, & “nerve blocks”):  I understand that rare but potential complications include headache, bleeding, infection, seizure, irregular heart rhythms, and nerve injury.    I can change my mind about having anesthesia services at any time before I get the medicine.    _____________________________________________________________________________  Patient (or Representative) Signature/Relationship to Patient  Date   Time    _____________________________________________________________________________   Name (if used)    Language/Organization   Time    _____________________________________________________________________________  Anesthesiologist Signature     Date   Time  I have discussed the procedure and information above with the patient (or patient’s representative) and answered their questions. The patient or their representative has agreed to have anesthesia services.    _____________________________________________________________________________  Witness        Date   Time  I have verified that the signature is that of the patient or patient’s representative, and that it was signed before the procedure  Patient Name: Stephy De Oliveira     : 1980                 Printed: 2024     Medical Record #: [id number]                     Page 2 of 2

## (undated) NOTE — LETTER
[address]  [address]  Authorization for Surgical Operation and Procedure     Date: JUNE 27, 2024___________                                                                                                         Time:__________  I hereby authorize Ameya Gray MD,my physician and his/her assistants (if applicable), which may include medical students, residents, and/or fellows, to perform the following surgical operation/ procedure and administer such anesthesia as may be determined necessary by my physician:  Operation/Procedure name (s) ENDOSCOPIC RETROGRADE CHOLANGIOPANCREATOGRAPHY WITH STENT PLACEMENT UNDER MONITORED ANESTHESIA CARE on [facility]   2.   I recognize that during the surgical operation/procedure, unforeseen conditions may necessitate additional or different procedures than those listed above.  I, therefore, further authorize and request that the above-named surgeon, assistants, or designees perform such procedures as are, in their judgment, necessary and desirable.    3.   My surgeon/physician has discussed prior to my surgery the potential benefits, risks and side effects of this procedure; the likelihood of achieving goals; and potential problems that might occur during recuperation.  They also discussed reasonable alternatives to the procedure, including risks, benefits, and side effects related to the alternatives and risks related to not receiving this procedure.  I have had all my questions answered and I acknowledge that no guarantee has been made as to the result that may be obtained.    4.   Should the need arise during my operation/procedure, which includes change of level of care prior to discharge, I also consent to the administration of blood and/or blood products.  Further, I understand that despite careful testing and screening of blood or blood products by collecting agencies, I may still be subject to ill effects as a result  of receiving a blood transfusion and/or blood products.  The following are some, but not all, of the potential risks that can occur: fever and allergic reactions, hemolytic reactions, transmission of diseases such as Hepatitis, AIDS and Cytomegalovirus (CMV) and fluid overload.  In the event that I wish to have an autologous transfusion of my own blood, or a directed donor transfusion, I will discuss this with my physician.  Check only if Refusing Blood or Blood Products  I understand refusal of blood or blood products as deemed necessary by my physician may have serious consequences to my condition to include possible death. I hereby assume responsibility for my refusal and release the hospital, its personnel, and my physicians from any responsibility for the consequences of my refusal.          o  Refuse      5.   I authorize the use of any specimen, organs, tissues, body parts or foreign objects that may be removed from my body during the operation/procedure for diagnosis, research or teaching purposes and their subsequent disposal by hospital authorities.  I also authorize the release of specimen test results and/or written reports to my treating physician on the hospital medical staff or other referring or consulting physicians involved in my care, at the discretion of the Pathologist or my treating physician.    6.   I consent to the photographing or videotaping of the operations or procedures to be performed, including appropriate portions of my body for medical, scientific, or educational purposes, provided my identity is not revealed by the pictures or by descriptive texts accompanying them.  If the procedure has been photographed/videotaped, the surgeon will obtain the original picture, image, videotape or CD.  The hospital will not be responsible for storage, release or maintenance of the picture, image, tape or CD.    7.   I consent to the presence of a  or observers in the operating  room as deemed necessary by my physician or their designees.    8.   I recognize that in the event my procedure results in extended X-Ray/fluoroscopy time, I may develop a skin reaction.    9. If I have a Do Not Attempt Resuscitation (DNAR) order in place, that status will be suspended while in the operating room, procedural suite, and during the recovery period unless otherwise explicitly stated by me (or a person authorized to consent on my behalf). The surgeon or my attending physician will determine when the applicable recovery period ends for purposes of reinstating the DNAR order.  10. Patients having a sterilization procedure: I understand that if the procedure is successful the results will be permanent and it will therefore be impossible for me to inseminate, conceive, or bear children.  I also understand that the procedure is intended to result in sterility, although the result has not been guaranteed.   11. I acknowledge that my physician has explained sedation/analgesia administration to me including the risk and benefits I consent to the administration of sedation/analgesia as may be necessary or desirable in the judgment of my physician.    I CERTIFY THAT I HAVE READ AND FULLY UNDERSTAND THE ABOVE CONSENT TO OPERATION and/or OTHER PROCEDURE.    _________________________________________  __________________________________  Signature of Patient     Signature of Responsible Person         ___________________________________         Printed Name of Responsible Person           _________________________________                 Relationship to Patient  _________________________________________  ______________________________  Signature of Witness          Date  Time      Patient Name: Stephy De Oliveira     : 1980                 Printed: 2024     Medical Record #: [id number]                     Page [address] of [address] [place] [address]   [id number]    Consent for Anesthesia    Stephy BUTLER agree to be cared for by an anesthesiologist, who is specially trained to monitor me and give me medicine to put me to sleep or keep me comfortable during my procedure    I understand that my anesthesiologist is not an employee or agent of [facility] or [employer]. He or she works for [employer].    As the patient asking for anesthesia services, I agree to:  Allow the anesthesiologist (anesthesia doctor) to give me medicine and do additional procedures as necessary. Some examples are: Starting or using an “IV” to give me medicine, fluids or blood during my procedure, and having a breathing tube placed to help me breathe when I’m asleep (intubation). In the event that my heart stops working properly, I understand that my anesthesiologist will make every effort to sustain my life, unless otherwise directed by [facility] Do Not Resuscitate documents.  Tell my anesthesia doctor before my procedure:  If I am pregnant.  The last time that I ate or drank.  All of the medicines I take (including prescriptions, herbal supplements, and pills I can buy without a prescription (including street drugs/illegal medications). Failure to inform my anesthesiologist about these medicines may increase my risk of anesthetic complications.  If I am allergic to anything or have had a reaction to anesthesia before.  I understand how the anesthesia medicine will help me (benefits).  I understand that with any type of anesthesia medicine there are risks:  The most common risks are: nausea, vomiting, sore throat, muscle soreness, damage to my eyes, mouth, or teeth (from breathing tube placement).  Rare risks include: remembering what happened during my procedure, allergic reactions to medications, injury to my airway, heart, lungs, vision, nerves, or muscles and in extremely rare instances death.  My doctor has explained to me other choices  available to me for my care (alternatives).  Pregnant Patients (“epidural”):  I understand that the risks of having an epidural (medicine given into my back to help control pain during labor), include itching, low blood pressure, difficulty urinating, headache or slowing of the baby’s heart. Very rare risks include infection, bleeding, seizure, irregular heart rhythms and nerve injury.  Regional Anesthesia (“spinal”, “epidural”, & “nerve blocks”):  I understand that rare but potential complications include headache, bleeding, infection, seizure, irregular heart rhythms, and nerve injury.    I can change my mind about having anesthesia services at any time before I get the medicine.    _____________________________________________________________________________  Patient (or Representative) Signature/Relationship to Patient  Date   Time    _____________________________________________________________________________   Name (if used)    Language/Organization   Time    _____________________________________________________________________________  Anesthesiologist Signature     Date   Time  I have discussed the procedure and information above with the patient (or patient’s representative) and answered their questions. The patient or their representative has agreed to have anesthesia services.    _____________________________________________________________________________  Witness        Date   Time  I have verified that the signature is that of the patient or patient’s representative, and that it was signed before the procedure  Patient Name: Stephy De Oliveira     : 1980                 Printed: 2024     Medical Record #: [id number]                     Page 2 of 2

## (undated) NOTE — LETTER
[address]  [address]  Authorization for Surgical Operation and Procedure     Date:___________                                                                                                         Time:__________  I hereby authorize Cheyanne Pereyra MD , my physician and his/her assistants (if applicable), which may include medical students, residents, and/or fellows, to perform the following surgical operation/ procedure and administer such anesthesia as may be determined necessary by my physician:    on Thi Lavonne De Oliveira   2.   I recognize that during the surgical operation/procedure, unforeseen conditions may necessitate additional or different procedures than those listed above.  I, therefore, further authorize and request that the above-named surgeon, assistants, or designees perform such procedures as are, in their judgment, necessary and desirable.    3.   My surgeon/physician has discussed prior to my surgery the potential benefits, risks and side effects of this procedure; the likelihood of achieving goals; and potential problems that might occur during recuperation.  They also discussed reasonable alternatives to the procedure, including risks, benefits, and side effects related to the alternatives and risks related to not receiving this procedure.  I have had all my questions answered and I acknowledge that no guarantee has been made as to the result that may be obtained.    4.   Should the need arise during my operation/procedure, which includes change of level of care prior to discharge, I also consent to the administration of blood and/or blood products.  Further, I understand that despite careful testing and screening of blood or blood products by collecting agencies, I may still be subject to ill effects as a result of receiving a blood transfusion and/or blood products.  The following are some, but not all, of the potential risks that can occur: fever and  allergic reactions, hemolytic reactions, transmission of diseases such as Hepatitis, AIDS and Cytomegalovirus (CMV) and fluid overload.  In the event that I wish to have an autologous transfusion of my own blood, or a directed donor transfusion, I will discuss this with my physician.  Check only if Refusing Blood or Blood Products  I understand refusal of blood or blood products as deemed necessary by my physician may have serious consequences to my condition to include possible death. I hereby assume responsibility for my refusal and release the hospital, its personnel, and my physicians from any responsibility for the consequences of my refusal.          o  Refuse      5.   I authorize the use of any specimen, organs, tissues, body parts or foreign objects that may be removed from my body during the operation/procedure for diagnosis, research or teaching purposes and their subsequent disposal by hospital authorities.  I also authorize the release of specimen test results and/or written reports to my treating physician on the hospital medical staff or other referring or consulting physicians involved in my care, at the discretion of the Pathologist or my treating physician.    6.   I consent to the photographing or videotaping of the operations or procedures to be performed, including appropriate portions of my body for medical, scientific, or educational purposes, provided my identity is not revealed by the pictures or by descriptive texts accompanying them.  If the procedure has been photographed/videotaped, the surgeon will obtain the original picture, image, videotape or CD.  The hospital will not be responsible for storage, release or maintenance of the picture, image, tape or CD.    7.   I consent to the presence of a  or observers in the operating room as deemed necessary by my physician or their designees.    8.   I recognize that in the event my procedure results in extended  X-Ray/fluoroscopy time, I may develop a skin reaction.    9. If I have a Do Not Attempt Resuscitation (DNAR) order in place, that status will be suspended while in the operating room, procedural suite, and during the recovery period unless otherwise explicitly stated by me (or a person authorized to consent on my behalf). The surgeon or my attending physician will determine when the applicable recovery period ends for purposes of reinstating the DNAR order.  10. Patients having a sterilization procedure: I understand that if the procedure is successful the results will be permanent and it will therefore be impossible for me to inseminate, conceive, or bear children.  I also understand that the procedure is intended to result in sterility, although the result has not been guaranteed.   11. I acknowledge that my physician has explained sedation/analgesia administration to me including the risk and benefits I consent to the administration of sedation/analgesia as may be necessary or desirable in the judgment of my physician.    I CERTIFY THAT I HAVE READ AND FULLY UNDERSTAND THE ABOVE CONSENT TO OPERATION and/or OTHER PROCEDURE.    _________________________________________  __________________________________  Signature of Patient     Signature of Responsible Person         ___________________________________         Printed Name of Responsible Person           _________________________________                 Relationship to Patient  _________________________________________  ______________________________  Signature of Witness          Date  Time      Patient Name: Stephy De Oliveira     : 1980                 Printed: 2024     Medical Record #: [id number]                     Page 1 of [address] [place] [address]  [id number]    Consent for Anesthesia    I, Stephy De Oliveira agree to be cared for by an anesthesiologist, who is specially trained to  monitor me and give me medicine to put me to sleep or keep me comfortable during my procedure    I understand that my anesthesiologist is not an employee or agent of [facility] or [employer]. He or she works for [employer].    As the patient asking for anesthesia services, I agree to:  Allow the anesthesiologist (anesthesia doctor) to give me medicine and do additional procedures as necessary. Some examples are: Starting or using an “IV” to give me medicine, fluids or blood during my procedure, and having a breathing tube placed to help me breathe when I’m asleep (intubation). In the event that my heart stops working properly, I understand that my anesthesiologist will make every effort to sustain my life, unless otherwise directed by [facility] Do Not Resuscitate documents.  Tell my anesthesia doctor before my procedure:  If I am pregnant.  The last time that I ate or drank.  All of the medicines I take (including prescriptions, herbal supplements, and pills I can buy without a prescription (including street drugs/illegal medications). Failure to inform my anesthesiologist about these medicines may increase my risk of anesthetic complications.  If I am allergic to anything or have had a reaction to anesthesia before.  I understand how the anesthesia medicine will help me (benefits).  I understand that with any type of anesthesia medicine there are risks:  The most common risks are: nausea, vomiting, sore throat, muscle soreness, damage to my eyes, mouth, or teeth (from breathing tube placement).  Rare risks include: remembering what happened during my procedure, allergic reactions to medications, injury to my airway, heart, lungs, vision, nerves, or muscles and in extremely rare instances death.  My doctor has explained to me other choices available to me for my care (alternatives).  Pregnant Patients (“epidural”):  I understand that the risks of having an epidural (medicine  given into my back to help control pain during labor), include itching, low blood pressure, difficulty urinating, headache or slowing of the baby’s heart. Very rare risks include infection, bleeding, seizure, irregular heart rhythms and nerve injury.  Regional Anesthesia (“spinal”, “epidural”, & “nerve blocks”):  I understand that rare but potential complications include headache, bleeding, infection, seizure, irregular heart rhythms, and nerve injury.    I can change my mind about having anesthesia services at any time before I get the medicine.    _____________________________________________________________________________  Patient (or Representative) Signature/Relationship to Patient  Date   Time    _____________________________________________________________________________   Name (if used)    Language/Organization   Time    _____________________________________________________________________________  Anesthesiologist Signature     Date   Time  I have discussed the procedure and information above with the patient (or patient’s representative) and answered their questions. The patient or their representative has agreed to have anesthesia services.    _____________________________________________________________________________  Witness        Date   Time  I have verified that the signature is that of the patient or patient’s representative, and that it was signed before the procedure  Patient Name: Stephy De Oliveira     : 1980                 Printed: 2024     Medical Record #: [id number]                     Page 2 of 2